# Patient Record
Sex: FEMALE | Race: WHITE | Employment: OTHER | URBAN - METROPOLITAN AREA
[De-identification: names, ages, dates, MRNs, and addresses within clinical notes are randomized per-mention and may not be internally consistent; named-entity substitution may affect disease eponyms.]

---

## 2023-10-30 ENCOUNTER — OFFICE VISIT (OUTPATIENT)
Age: 88
End: 2023-10-30

## 2023-10-30 ENCOUNTER — HOSPITAL ENCOUNTER (OUTPATIENT)
Age: 88
Discharge: HOME | End: 2023-11-02
Payer: MEDICARE

## 2023-10-30 VITALS — BODY MASS INDEX: 22.18 KG/M2 | HEIGHT: 66 IN | WEIGHT: 138 LBS

## 2023-10-30 DIAGNOSIS — Z01.818 PRE-OP TESTING: ICD-10-CM

## 2023-10-30 DIAGNOSIS — S63.502A SPRAIN OF LEFT WRIST, INITIAL ENCOUNTER: Primary | ICD-10-CM

## 2023-10-30 DIAGNOSIS — S63.502A SPRAIN OF LEFT WRIST, INITIAL ENCOUNTER: ICD-10-CM

## 2023-10-30 LAB
ANION GAP SERPL CALC-SCNC: 5 MMOL/L (ref 3–18)
BUN SERPL-MCNC: 24 MG/DL (ref 7–18)
BUN/CREAT SERPL: 22 (ref 12–20)
CA-I BLD-MCNC: 9 MG/DL (ref 8.5–10.1)
CHLORIDE SERPL-SCNC: 102 MMOL/L (ref 100–111)
CO2 SERPL-SCNC: 34 MMOL/L (ref 21–32)
CREAT SERPL-MCNC: 1.07 MG/DL (ref 0.6–1.3)
ERYTHROCYTE [DISTWIDTH] IN BLOOD BY AUTOMATED COUNT: 12.5 % (ref 11.6–14.5)
GLUCOSE SERPL-MCNC: 101 MG/DL (ref 74–99)
HCT VFR BLD AUTO: 36.3 % (ref 35–45)
HGB BLD-MCNC: 12.1 G/DL (ref 12–16)
MCH RBC QN AUTO: 31.1 PG (ref 24–34)
MCHC RBC AUTO-ENTMCNC: 33.3 G/DL (ref 31–37)
MCV RBC AUTO: 93.3 FL (ref 78–100)
NRBC # BLD: 0 K/UL (ref 0–0.01)
NRBC BLD-RTO: 0 PER 100 WBC
PLATELET # BLD AUTO: 218 K/UL (ref 135–420)
PMV BLD AUTO: 10.4 FL (ref 9.2–11.8)
POTASSIUM SERPL-SCNC: 3.6 MMOL/L (ref 3.5–5.5)
RBC # BLD AUTO: 3.89 M/UL (ref 4.2–5.3)
SODIUM SERPL-SCNC: 141 MMOL/L (ref 136–145)
WBC # BLD AUTO: 7.6 K/UL (ref 4.6–13.2)

## 2023-10-30 PROCEDURE — 85027 COMPLETE CBC AUTOMATED: CPT

## 2023-10-30 PROCEDURE — 80048 BASIC METABOLIC PNL TOTAL CA: CPT

## 2023-10-30 PROCEDURE — 93005 ELECTROCARDIOGRAM TRACING: CPT

## 2023-10-30 PROCEDURE — 36415 COLL VENOUS BLD VENIPUNCTURE: CPT

## 2023-10-30 RX ORDER — CEPHALEXIN 500 MG/1
500 CAPSULE ORAL EVERY 8 HOURS
Qty: 21 CAPSULE | Refills: 0 | Status: SHIPPED | OUTPATIENT
Start: 2023-10-30 | End: 2023-11-06

## 2023-10-30 RX ORDER — ONDANSETRON 8 MG/1
8 TABLET, ORALLY DISINTEGRATING ORAL EVERY 8 HOURS PRN
Qty: 20 TABLET | Refills: 0 | Status: SHIPPED | OUTPATIENT
Start: 2023-10-30 | End: 2023-11-06

## 2023-10-30 RX ORDER — SODIUM CHLORIDE 0.9 % (FLUSH) 0.9 %
5-40 SYRINGE (ML) INJECTION EVERY 12 HOURS SCHEDULED
OUTPATIENT
Start: 2023-10-30

## 2023-10-30 RX ORDER — SODIUM CHLORIDE 0.9 % (FLUSH) 0.9 %
5-40 SYRINGE (ML) INJECTION PRN
OUTPATIENT
Start: 2023-10-30

## 2023-10-30 RX ORDER — OXYCODONE HYDROCHLORIDE AND ACETAMINOPHEN 5; 325 MG/1; MG/1
1 TABLET ORAL
Qty: 30 TABLET | Refills: 0 | Status: SHIPPED | OUTPATIENT
Start: 2023-10-30 | End: 2023-11-07

## 2023-10-30 RX ORDER — CELECOXIB 100 MG/1
200 CAPSULE ORAL ONCE
OUTPATIENT
Start: 2023-10-30 | End: 2023-10-30

## 2023-10-30 RX ORDER — SODIUM CHLORIDE, SODIUM LACTATE, POTASSIUM CHLORIDE, CALCIUM CHLORIDE 600; 310; 30; 20 MG/100ML; MG/100ML; MG/100ML; MG/100ML
INJECTION, SOLUTION INTRAVENOUS CONTINUOUS
OUTPATIENT
Start: 2023-10-30

## 2023-10-30 NOTE — PATIENT INSTRUCTIONS
adapted under license by [facility]). If you have questions about a medical condition or this instruction, always ask your healthcare professional. [address] any warranty or liability for your use of this information.

## 2023-10-31 ENCOUNTER — TELEPHONE (OUTPATIENT)
Age: 88
End: 2023-10-31

## 2023-10-31 DIAGNOSIS — S63.502A SPRAIN OF LEFT WRIST, INITIAL ENCOUNTER: Primary | ICD-10-CM

## 2023-10-31 LAB
EKG ATRIAL RATE: 74 BPM
EKG DIAGNOSIS: NORMAL
EKG P AXIS: 54 DEGREES
EKG P-R INTERVAL: 188 MS
EKG Q-T INTERVAL: 390 MS
EKG QRS DURATION: 90 MS
EKG QTC CALCULATION (BAZETT): 432 MS
EKG R AXIS: 26 DEGREES
EKG T AXIS: 47 DEGREES
EKG VENTRICULAR RATE: 74 BPM

## 2023-10-31 RX ORDER — UBIDECARENONE 75 MG
50 CAPSULE ORAL DAILY
COMMUNITY

## 2023-10-31 RX ORDER — LEVOTHYROXINE SODIUM 0.05 MG/1
50 TABLET ORAL DAILY
COMMUNITY

## 2023-10-31 RX ORDER — METOPROLOL TARTRATE 50 MG/1
50 TABLET, FILM COATED ORAL 2 TIMES DAILY
COMMUNITY

## 2023-10-31 RX ORDER — ROPINIROLE 12 MG/1
12 TABLET, FILM COATED, EXTENDED RELEASE ORAL DAILY
COMMUNITY

## 2023-10-31 RX ORDER — FENTANYL 50 UG/1
1 PATCH TRANSDERMAL
COMMUNITY

## 2023-10-31 RX ORDER — HYDROCHLOROTHIAZIDE 12.5 MG/1
12.5 CAPSULE, GELATIN COATED ORAL EVERY MORNING
COMMUNITY

## 2023-10-31 RX ORDER — PANTOPRAZOLE SODIUM 20 MG/1
20 TABLET, DELAYED RELEASE ORAL DAILY
COMMUNITY

## 2023-10-31 RX ORDER — OXYCODONE HYDROCHLORIDE 5 MG/1
5 TABLET ORAL
Qty: 30 TABLET | Refills: 0 | Status: SHIPPED | OUTPATIENT
Start: 2023-10-31 | End: 2023-11-07

## 2023-10-31 RX ORDER — DULOXETIN HYDROCHLORIDE 20 MG/1
20 CAPSULE, DELAYED RELEASE ORAL DAILY
COMMUNITY

## 2023-10-31 RX ORDER — ACETAMINOPHEN 500 MG
1000 TABLET ORAL 3 TIMES DAILY
Qty: 180 TABLET | Refills: 0 | Status: SHIPPED | OUTPATIENT
Start: 2023-10-31

## 2023-10-31 RX ORDER — ATORVASTATIN CALCIUM 40 MG/1
40 TABLET, FILM COATED ORAL DAILY
COMMUNITY

## 2023-10-31 RX ORDER — GABAPENTIN 100 MG/1
100 CAPSULE ORAL 3 TIMES DAILY
COMMUNITY

## 2023-10-31 RX ORDER — MELOXICAM 15 MG/1
15 TABLET ORAL DAILY
COMMUNITY

## 2023-10-31 RX ORDER — FUROSEMIDE 20 MG/1
10 TABLET ORAL DAILY
COMMUNITY

## 2023-10-31 RX ORDER — CALCIUM CARBONATE 500(1250)
500 TABLET ORAL DAILY
COMMUNITY

## 2023-10-31 NOTE — TELEPHONE ENCOUNTER
Pharmacy called today, states they do not have percocet 5-325mg in stock, it is backordered. Please send in Oxy 5mg and Tylenol 325mg as they can fill that. Thanks!

## 2023-11-01 DIAGNOSIS — S63.502A SPRAIN OF LEFT WRIST, INITIAL ENCOUNTER: Primary | ICD-10-CM

## 2023-11-02 ENCOUNTER — ANESTHESIA EVENT (OUTPATIENT)
Age: 88
End: 2023-11-02
Payer: MEDICARE

## 2023-11-02 RX ORDER — SODIUM CHLORIDE, SODIUM LACTATE, POTASSIUM CHLORIDE, CALCIUM CHLORIDE 600; 310; 30; 20 MG/100ML; MG/100ML; MG/100ML; MG/100ML
INJECTION, SOLUTION INTRAVENOUS CONTINUOUS
Status: CANCELLED | OUTPATIENT
Start: 2023-11-02

## 2023-11-02 RX ORDER — ONDANSETRON 2 MG/ML
4 INJECTION INTRAMUSCULAR; INTRAVENOUS
Status: CANCELLED | OUTPATIENT
Start: 2023-11-02 | End: 2023-11-03

## 2023-11-02 RX ORDER — SODIUM CHLORIDE 0.9 % (FLUSH) 0.9 %
5-40 SYRINGE (ML) INJECTION EVERY 12 HOURS SCHEDULED
Status: CANCELLED | OUTPATIENT
Start: 2023-11-02

## 2023-11-02 RX ORDER — FENTANYL CITRATE 50 UG/ML
50 INJECTION, SOLUTION INTRAMUSCULAR; INTRAVENOUS EVERY 5 MIN PRN
Status: CANCELLED | OUTPATIENT
Start: 2023-11-02

## 2023-11-02 RX ORDER — DEXAMETHASONE SODIUM PHOSPHATE 4 MG/ML
4 INJECTION, SOLUTION INTRA-ARTICULAR; INTRALESIONAL; INTRAMUSCULAR; INTRAVENOUS; SOFT TISSUE
Status: CANCELLED | OUTPATIENT
Start: 2023-11-02 | End: 2023-11-03

## 2023-11-06 ENCOUNTER — APPOINTMENT (OUTPATIENT)
Age: 88
End: 2023-11-06
Attending: ORTHOPAEDIC SURGERY
Payer: MEDICARE

## 2023-11-06 ENCOUNTER — HOSPITAL ENCOUNTER (OUTPATIENT)
Age: 88
Setting detail: OUTPATIENT SURGERY
Discharge: HOME | End: 2023-11-06
Attending: ORTHOPAEDIC SURGERY | Admitting: ORTHOPAEDIC SURGERY
Payer: MEDICARE

## 2023-11-06 ENCOUNTER — ANESTHESIA (OUTPATIENT)
Age: 88
End: 2023-11-06
Payer: MEDICARE

## 2023-11-06 VITALS
OXYGEN SATURATION: 100 % | TEMPERATURE: 97.5 F | BODY MASS INDEX: 20.78 KG/M2 | HEIGHT: 66 IN | RESPIRATION RATE: 16 BRPM | HEART RATE: 58 BPM | DIASTOLIC BLOOD PRESSURE: 76 MMHG | WEIGHT: 129.3 LBS | SYSTOLIC BLOOD PRESSURE: 165 MMHG

## 2023-11-06 PROCEDURE — 3700000000 HC ANESTHESIA ATTENDED CARE: Performed by: ORTHOPAEDIC SURGERY

## 2023-11-06 PROCEDURE — 3700000001 HC ADD 15 MINUTES (ANESTHESIA): Performed by: ORTHOPAEDIC SURGERY

## 2023-11-06 PROCEDURE — 73100 X-RAY EXAM OF WRIST: CPT

## 2023-11-06 PROCEDURE — 6360000002 HC RX W HCPCS: Performed by: ORTHOPAEDIC SURGERY

## 2023-11-06 PROCEDURE — 3600000013 HC SURGERY LEVEL 3 ADDTL 15MIN: Performed by: ORTHOPAEDIC SURGERY

## 2023-11-06 PROCEDURE — 6360000002 HC RX W HCPCS

## 2023-11-06 PROCEDURE — 2709999900 HC NON-CHARGEABLE SUPPLY: Performed by: ORTHOPAEDIC SURGERY

## 2023-11-06 PROCEDURE — 7100000011 HC PHASE II RECOVERY - ADDTL 15 MIN: Performed by: ORTHOPAEDIC SURGERY

## 2023-11-06 PROCEDURE — 3600000003 HC SURGERY LEVEL 3 BASE: Performed by: ORTHOPAEDIC SURGERY

## 2023-11-06 PROCEDURE — C1713 ANCHOR/SCREW BN/BN,TIS/BN: HCPCS | Performed by: ORTHOPAEDIC SURGERY

## 2023-11-06 PROCEDURE — 2580000003 HC RX 258

## 2023-11-06 PROCEDURE — 2580000003 HC RX 258: Performed by: ORTHOPAEDIC SURGERY

## 2023-11-06 PROCEDURE — 6370000000 HC RX 637 (ALT 250 FOR IP)

## 2023-11-06 PROCEDURE — 2500000003 HC RX 250 WO HCPCS: Performed by: ORTHOPAEDIC SURGERY

## 2023-11-06 PROCEDURE — 6360000002 HC RX W HCPCS: Performed by: NURSE ANESTHETIST, CERTIFIED REGISTERED

## 2023-11-06 PROCEDURE — 7100000010 HC PHASE II RECOVERY - FIRST 15 MIN: Performed by: ORTHOPAEDIC SURGERY

## 2023-11-06 DEVICE — WIRE FIX L229MM DIA1.6MM S STL STYL 7 SMOOTH DBL BAYNT TIP: Type: IMPLANTABLE DEVICE | Site: WRIST | Status: FUNCTIONAL

## 2023-11-06 RX ORDER — SODIUM CHLORIDE, SODIUM LACTATE, POTASSIUM CHLORIDE, CALCIUM CHLORIDE 600; 310; 30; 20 MG/100ML; MG/100ML; MG/100ML; MG/100ML
INJECTION, SOLUTION INTRAVENOUS CONTINUOUS
Status: DISCONTINUED | OUTPATIENT
Start: 2023-11-06 | End: 2023-11-06 | Stop reason: HOSPADM

## 2023-11-06 RX ORDER — KETOROLAC TROMETHAMINE 30 MG/ML
30 INJECTION, SOLUTION INTRAMUSCULAR; INTRAVENOUS EVERY 6 HOURS PRN
Status: CANCELLED | OUTPATIENT
Start: 2023-11-06 | End: 2023-11-06

## 2023-11-06 RX ORDER — ONDANSETRON 4 MG/1
4 TABLET, ORALLY DISINTEGRATING ORAL EVERY 8 HOURS PRN
Status: DISCONTINUED | OUTPATIENT
Start: 2023-11-06 | End: 2023-11-06 | Stop reason: HOSPADM

## 2023-11-06 RX ORDER — BUPIVACAINE HYDROCHLORIDE 2.5 MG/ML
INJECTION, SOLUTION EPIDURAL; INFILTRATION; INTRACAUDAL PRN
Status: DISCONTINUED | OUTPATIENT
Start: 2023-11-06 | End: 2023-11-06 | Stop reason: ALTCHOICE

## 2023-11-06 RX ORDER — OXYCODONE HYDROCHLORIDE AND ACETAMINOPHEN 5; 325 MG/1; MG/1
1 TABLET ORAL EVERY 4 HOURS PRN
Status: DISCONTINUED | OUTPATIENT
Start: 2023-11-06 | End: 2023-11-06 | Stop reason: HOSPADM

## 2023-11-06 RX ORDER — FENTANYL CITRATE 50 UG/ML
INJECTION, SOLUTION INTRAMUSCULAR; INTRAVENOUS PRN
Status: DISCONTINUED | OUTPATIENT
Start: 2023-11-06 | End: 2023-11-06 | Stop reason: SDUPTHER

## 2023-11-06 RX ORDER — SODIUM CHLORIDE 0.9 % (FLUSH) 0.9 %
5-40 SYRINGE (ML) INJECTION PRN
Status: DISCONTINUED | OUTPATIENT
Start: 2023-11-06 | End: 2023-11-06 | Stop reason: HOSPADM

## 2023-11-06 RX ORDER — PROPOFOL 10 MG/ML
INJECTION, EMULSION INTRAVENOUS PRN
Status: DISCONTINUED | OUTPATIENT
Start: 2023-11-06 | End: 2023-11-06 | Stop reason: SDUPTHER

## 2023-11-06 RX ORDER — OXYCODONE HYDROCHLORIDE AND ACETAMINOPHEN 5; 325 MG/1; MG/1
2 TABLET ORAL EVERY 4 HOURS PRN
Status: DISCONTINUED | OUTPATIENT
Start: 2023-11-06 | End: 2023-11-06 | Stop reason: HOSPADM

## 2023-11-06 RX ORDER — ONDANSETRON 2 MG/ML
4 INJECTION INTRAMUSCULAR; INTRAVENOUS EVERY 6 HOURS PRN
Status: DISCONTINUED | OUTPATIENT
Start: 2023-11-06 | End: 2023-11-06 | Stop reason: HOSPADM

## 2023-11-06 RX ADMIN — FENTANYL CITRATE 100 MCG: 50 INJECTION, SOLUTION INTRAMUSCULAR; INTRAVENOUS at 07:34

## 2023-11-06 RX ADMIN — PROPOFOL 100 MG: 10 INJECTION, EMULSION INTRAVENOUS at 07:39

## 2023-11-06 RX ADMIN — OXYCODONE AND ACETAMINOPHEN 1 TABLET: 5; 325 TABLET ORAL at 08:28

## 2023-11-06 RX ADMIN — PROPOFOL 100 MG: 10 INJECTION, EMULSION INTRAVENOUS at 07:45

## 2023-11-06 RX ADMIN — WATER 2000 MG: 1 INJECTION INTRAMUSCULAR; INTRAVENOUS; SUBCUTANEOUS at 07:34

## 2023-11-06 ASSESSMENT — PAIN DESCRIPTION - LOCATION
LOCATION: ARM

## 2023-11-06 ASSESSMENT — PAIN DESCRIPTION - DESCRIPTORS
DESCRIPTORS: SORE
DESCRIPTORS: ACHING;SORE
DESCRIPTORS: ACHING

## 2023-11-06 ASSESSMENT — PAIN DESCRIPTION - FREQUENCY
FREQUENCY: CONTINUOUS
FREQUENCY: CONTINUOUS

## 2023-11-06 ASSESSMENT — PAIN - FUNCTIONAL ASSESSMENT: PAIN_FUNCTIONAL_ASSESSMENT: 0-10

## 2023-11-06 ASSESSMENT — PAIN SCALES - GENERAL
PAINLEVEL_OUTOF10: 4
PAINLEVEL_OUTOF10: 5
PAINLEVEL_OUTOF10: 6
PAINLEVEL_OUTOF10: 6

## 2023-11-06 ASSESSMENT — PAIN DESCRIPTION - ORIENTATION
ORIENTATION: LEFT

## 2023-11-06 ASSESSMENT — PAIN DESCRIPTION - PAIN TYPE
TYPE: SURGICAL PAIN
TYPE: SURGICAL PAIN

## 2023-11-06 ASSESSMENT — PAIN DESCRIPTION - ONSET: ONSET: GRADUAL

## 2023-11-06 NOTE — ANESTHESIA POSTPROCEDURE EVALUATION
Department of Anesthesiology  Postprocedure Note    Patient: Cheryl Maldonado  MRN: [id number]  YOB: 1935  Date of evaluation: 11/6/2023      Procedure Summary     Date: 11/06/23 Room / Location: [facility] MAIN 03 / [facility] MAIN OR    Anesthesia Start: 0727 Anesthesia Stop: 0806    Procedure: LEFT WRIST CLOSED REDUCTION AND PINNING (Left: Wrist) Diagnosis:       Left wrist pain      (Left wrist pain [M25.532])    Surgeons: Marissa Pennington MD Responsible Provider: LILI Villatoro CRNA    Anesthesia Type: MAC ASA Status: 2          Anesthesia Type: MAC    Roxanna Phase I: Roxanna Score: 10    Roxanna Phase II:        Anesthesia Post Evaluation    Patient location during evaluation: bedside  Patient participation: complete - patient participated  Level of consciousness: awake and awake and alert  Pain score: 0  Airway patency: patent  Nausea & Vomiting: no nausea  Complications: no  Cardiovascular status: blood pressure returned to baseline  Respiratory status: acceptable  Hydration status: euvolemic  Multimodal analgesia pain management approach  Pain management: adequate

## 2023-11-06 NOTE — INTERVAL H&P NOTE
Update History & Physical    The Patient's History and Physical was reviewed with the patient. The patient was examined. There was no change. The surgical site was confirmed by the patient and me. Patient understands and wants to proceed with the procedure. If applicable, I have discussed with the patient / power of  the rationale for blood component transfusion; its benefits in treating or preventing fatigue, organ damage, or death; and its risk which includes mild transfusion reactions, rare risk of blood borne infection, or more serious but rare reactions. I have discussed the alternatives to transfusion, including the risk and consequences of not receiving transfusion. The patient / [facility]  had an opportunity to ask questions and had agreed to proceed with transfusion of blood components. Plan:  The risk, benefits, expected outcome, and alternative to the recommended procedure have been discussed with the patient.

## 2023-11-06 NOTE — OP NOTE
Operative Note    Patient: Florencio Noel MRN: [id number]  Surgery Date: 11/6/2023           Procedure  Primary Surgeon    LEFT WRIST CLOSED REDUCTION AND PINNING  Dean Anderson MD    * Panel 2 does not exist *  * Panel 2 does not exist *    * Panel 3 does not exist *  * Panel 3 does not exist *     Surgeon(s) and Role:     * Dean Anderson MD - Primary    Other OR Staff/Assistants:  Circulator: Jose Maria Fuentes RN  First Assistant: Gricelda Olsen RN  Scrub Person First: Joe Toro    1st Assistant Tasks:  Closing    Pre-operative Diagnosis:  Left wrist pain [M25.532]    Post-operative Diagnosis: same as preop diagnosis    Anesthesia Type: General     Findings: Distal radius closed fracture    Complications: No    EBL: 5 cc    Specimens: None    Implants:   Implants       Type Not Specified    Wire Fix L229mm Dia1.6mm S Stl Styl 7 Smooth Dbl Baynt Tip - [id number] - Implanted   (Left) Wrist      Inventory item: WIRE FIX L229MM DIA1. 6MM S STL STYL 7 SMOOTH DBL BAYNT TIP Model/Cat number: [id number]    : Ron Orris TRAUMA-WD Lot number: [id number]      As of 11/6/2023       Status: Implanted                          Operative procedure: Left wrist closed reduction and pinning    Operative note: Left wrist was prepped and draped in sterile fashion after adequate anesthesia was given timeout performed and tourniquet was inflated. Subsequently under fluoroscopy the wrist was closed reduced. Put 2 percutaneous pins in the radial styloid were placed and confirmed good position on AP and lateral radiographs. Subsequently the pins were cut. Ana Rosa Ross ball was placed. Patient was placed into his small volar soft padded splint. Patient was taken to PACU in stable condition. Post percutaneous pin placement radiographs revealed adequate alignment.         Of note fluoroscopy was utilized for approximately 10 seconds for closed reduction percutaneous pinning    Dean Anderson MD

## 2023-11-15 ENCOUNTER — OFFICE VISIT (OUTPATIENT)
Age: 88
End: 2023-11-15

## 2023-11-15 DIAGNOSIS — S52.552D OTHER CLOSED EXTRA-ARTICULAR FRACTURE OF DISTAL END OF LEFT RADIUS WITH ROUTINE HEALING, SUBSEQUENT ENCOUNTER: Primary | ICD-10-CM

## 2023-11-15 DIAGNOSIS — Z87.81 HX OF REDUCTION OF CLOSED FRACTURE: ICD-10-CM

## 2023-11-15 DIAGNOSIS — M25.532 LEFT WRIST PAIN: ICD-10-CM

## 2023-12-11 NOTE — PROGRESS NOTES
Review of Systems:  Patient is a pleasant appearing individual, appropriately dressed, well hydrated, well nourished, who is alert, appropriately oriented for age, and in no acute distress with a normal gait and normal affect who does not appear to be in any significant pain. Physical Exam:  Left wrist - incisions clean and dry, mild swelling, neurovascularly intact, pins in place, no erythema, decreased range of motion, moderate weakness to the wrist and hand     Please note that a DME was provided from our office and fitted to an appropriate size. DME provided will help decrease soft tissue swelling, assist with stabilization, and decrease pain with immobilization. Plan:  1. Initiate OT - patient to call with location   2. Remove pins, transition to wrist splint, - wound care/showering discussed. 3. Follow up in 4 weeks with repeat xrays for the left wrist to recheck incision, range of motion, and as well as needed or if symptoms worsen. Encounter Diagnoses   Name Primary? Other closed extra-articular fracture of distal end of left radius with routine healing, subsequent encounter Yes    Hx of reduction of closed fracture         As part of continued conservative pain management options the patient was advised to utilize Tylenol or OTC NSAIDS as long as it is not medically contraindicated. Return to Office:      Scribed by LILI Shook - CNP as dictated by LILI Howe, CNP. Documentation, performed by, True and Accepted LILI Howe, CNP

## 2023-12-12 ENCOUNTER — OFFICE VISIT (OUTPATIENT)
Age: 88
End: 2023-12-12

## 2023-12-12 DIAGNOSIS — Z87.81 HX OF REDUCTION OF CLOSED FRACTURE: ICD-10-CM

## 2023-12-12 DIAGNOSIS — S52.552D OTHER CLOSED EXTRA-ARTICULAR FRACTURE OF DISTAL END OF LEFT RADIUS WITH ROUTINE HEALING, SUBSEQUENT ENCOUNTER: Primary | ICD-10-CM

## 2024-01-16 ENCOUNTER — OFFICE VISIT (OUTPATIENT)
Age: 89
End: 2024-01-16

## 2024-01-16 DIAGNOSIS — Z87.81 HX OF REDUCTION OF CLOSED FRACTURE: ICD-10-CM

## 2024-01-16 DIAGNOSIS — M25.532 LEFT WRIST PAIN: ICD-10-CM

## 2024-01-16 DIAGNOSIS — S52.552D OTHER CLOSED EXTRA-ARTICULAR FRACTURE OF DISTAL END OF LEFT RADIUS WITH ROUTINE HEALING, SUBSEQUENT ENCOUNTER: Primary | ICD-10-CM

## 2024-01-16 PROCEDURE — 99024 POSTOP FOLLOW-UP VISIT: CPT

## 2024-01-16 NOTE — PROGRESS NOTES
Name: Jericho Gómez    : 1935        2023     9:00 AM 2023     8:32 AM 2023     8:17 AM 2023     8:02 AM 2023     6:44 AM 10/30/2023     4:03 PM   Ambulatory Bariatric Summary   Systolic 165 176 171 159 206    Diastolic 76 76 77 70 72    Pulse 58 58 59 58 61    Temp    97.5 °F (36.4 °C) 98.1 °F (36.7 °C)    Respirations 16 17 16 16 18    Weight - Scale     129.3 138   Height     1.676 m (5' 6\") 1.676 m (5' 6\")   BMI     20.9 kg/m2 22.3 kg/m2   Weight - Scale     58.7 kg (129 lb 4.8 oz) 62.6 kg (138 lb)   BMI (Calculated)     20.9 22.3       There is no height or weight on file to calculate BMI.    Service Dept: [facility] ORTHOPAEDICS AND SPORTS MEDICINE  [address] [place] [address]  Dept: [phone]  Dept Fax: [phone]     Patient's Pharmacies:    [facility] -  [phone] -  [phone]  [address] [place] [address]  Phone: [phone] Fax: [phone]       Chief Complaint   Patient presents with    Post-Op Check    Wrist Pain       HPI:  Patient presents for postop care following a left wrist closed reduction and pinning. Surgery was on 2023. Pain is a 2/10. Pain is controlled with current analgesics.  Medication(s) being used: acetaminophen.     X-rays of the left wrist 3 views performed today in the office show a well aligned distal radius fracture post closed reduction and pinning with routine healing.        There were no vitals taken for this visit.   No Known Allergies   Current Outpatient Medications   Medication Sig Dispense Refill    DULoxetine (CYMBALTA) 20 MG extended release capsule Take 1 capsule by mouth daily      fentaNYL (DURAGESIC) 50 MCG/HR Place 1 patch onto the skin every 72 hours.      gabapentin (NEURONTIN) 100 MG capsule Take 1 capsule by mouth 3 times daily.      atorvastatin (LIPITOR) 40 MG tablet Take

## 2024-01-16 NOTE — PATIENT INSTRUCTIONS
Colles Fracture: Care Instructions  Overview     A Colles (say \"CALL-us\" or \"CALL-eez\") fracture is a specific type of broken wrist. In this type of fracture, the broken bone in the wrist tilts upward when the hand is palm down. The break may happen when you throw out a hand to protect yourself in a fall. Your treatment depends on how bad the break is.  Your doctor may have put your wrist in a cast or splint. This will help keep your wrist stable and keep the bone in the right position. Sometimes surgery is needed to help keep the bone in position for good healing.  It will take several weeks to a few months for your wrist to heal. You can help it heal with care at home. You heal best when you take good care of yourself. Eat a variety of healthy foods, and don't smoke.  You may have had a sedative to help you relax. You may be unsteady after having sedation. It can take a few hours for the medicine's effects to wear off. Common side effects include nausea, vomiting, and feeling sleepy or tired.  The doctor has checked you carefully, but problems can develop later. If you notice any problems or new symptoms, get medical treatment right away.  Follow-up care is a key part of your treatment and safety. Be sure to make and go to all appointments, and call your doctor if you are having problems. It's also a good idea to know your test results and keep a list of the medicines you take.  How can you care for yourself at home?  If your doctor gave you a sedative:  For 24 hours, don't do anything that requires attention to detail, such as going to work, making important decisions, or signing any legal documents. It takes time for the medicine's effects to completely wear off.  For your safety, do not drive or operate any machinery that could be dangerous. Wait until the medicine wears off. You need to be able to think clearly and react easily.  Be safe with medicines. Take pain medicines exactly as directed.  If the doctor

## 2024-05-04 ENCOUNTER — HOSPITAL ENCOUNTER (EMERGENCY)
Age: 89
Discharge: HOME | End: 2024-05-04
Attending: EMERGENCY MEDICINE
Payer: MEDICARE

## 2024-05-04 ENCOUNTER — APPOINTMENT (OUTPATIENT)
Age: 89
End: 2024-05-04
Payer: MEDICARE

## 2024-05-04 VITALS
OXYGEN SATURATION: 98 % | RESPIRATION RATE: 16 BRPM | HEART RATE: 70 BPM | DIASTOLIC BLOOD PRESSURE: 63 MMHG | SYSTOLIC BLOOD PRESSURE: 163 MMHG | TEMPERATURE: 98.6 F

## 2024-05-04 DIAGNOSIS — S52.501A CLOSED FRACTURE OF DISTAL END OF RIGHT RADIUS, UNSPECIFIED FRACTURE MORPHOLOGY, INITIAL ENCOUNTER: ICD-10-CM
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
## 2024-05-04 PROCEDURE — 73100 X-RAY EXAM OF WRIST: CPT

## 2024-05-04 PROCEDURE — 99283 EMERGENCY DEPT VISIT LOW MDM: CPT

## 2024-05-04 PROCEDURE — 6370000000 HC RX 637 (ALT 250 FOR IP): Performed by: EMERGENCY MEDICINE

## 2024-05-04 RX ORDER — ACETAMINOPHEN 500 MG
1000 TABLET ORAL
Status: COMPLETED | OUTPATIENT
Start: 2024-05-04 | End: 2024-05-04

## 2024-05-04 RX ADMIN — ACETAMINOPHEN 1000 MG: 500 TABLET ORAL at 17:32

## 2024-05-04 ASSESSMENT — LIFESTYLE VARIABLES
HOW OFTEN DO YOU HAVE A DRINK CONTAINING ALCOHOL: NEVER
HOW MANY STANDARD DRINKS CONTAINING ALCOHOL DO YOU HAVE ON A TYPICAL DAY: PATIENT DOES NOT DRINK

## 2024-05-04 NOTE — ED PROVIDER NOTES
[facility] EMERGENCY DEPT  EMERGENCY DEPARTMENT ENCOUNTER       Pt Name: Jericho Gómez  MRN: [id number]  Birthdate 1935  Date of evaluation: 5/4/2024  Provider: Enio Sahni MD   PCP: Unknown, Provider, APRN - NP  Note Started: 7:01 PM 5/4/24     CHIEF COMPLAINT       Chief Complaint   Patient presents with    Wrist Injury        HISTORY OF PRESENT ILLNESS: 1 or more elements      History From: Patient  History limited by: Nothing     Jericho Gómez is a 89 y.o. female who presents to ED complaining of a fall, she reports stumbled on a toy and fell while picking strawberries about 2 hours prior to ED arrival.  She reports injury to her right wrist.  She denies head neck injuries.  She has chronic pain and wears fentanyl.  She reports chronic pain is in the back.     Nursing Notes were all reviewed and agreed with or any disagreements were addressed in the HPI.     REVIEW OF SYSTEMS      Review of Systems     Positives and Pertinent negatives as per HPI.    PAST HISTORY     Past Medical History:  Past Medical History:   Diagnosis Date    Arthritis     Hypercholesteremia     Hypertension     Osteoporosis     Thyroid disease        Past Surgical History:  Past Surgical History:   Procedure Laterality Date    CHOLECYSTECTOMY      HYSTERECTOMY (CERVIX STATUS UNKNOWN)      WRIST CLOSED REDUCTION Left 11/6/2023    LEFT WRIST CLOSED REDUCTION AND PINNING performed by Elijah Ramirez MD at [facility] MAIN OR       Family History:  Family History   Problem Relation Age of Onset    No Known Problems Mother     No Known Problems Father        Social History:  Social History     Tobacco Use    Smoking status: Never    Smokeless tobacco: Never   Substance Use Topics    Alcohol use: Never       Allergies:  No Known Allergies    CURRENT MEDICATIONS      Previous Medications    ACETAMINOPHEN (TYLENOL) 500 MG TABLET    Take 2 tablets by mouth 3 times daily    ATORVASTATIN (LIPITOR) 40 MG TABLET    Take 1 tablet by mouth

## 2024-05-04 NOTE — ED TRIAGE NOTES
Patient fell outside while picking strawberries, right wrist pain with swelling noted. Patient able to move fingers, + cap refills brisk, + radial pulses. No pain meds at home due to wearing fentanyl patch

## 2024-05-09 ENCOUNTER — OFFICE VISIT (OUTPATIENT)
Age: 89
End: 2024-05-09
Payer: MEDICARE

## 2024-05-09 DIAGNOSIS — S52.531A CLOSED COLLES' FRACTURE OF RIGHT RADIUS, INITIAL ENCOUNTER: Primary | ICD-10-CM

## 2024-05-09 DIAGNOSIS — M25.531 RIGHT WRIST PAIN: ICD-10-CM

## 2024-05-09 PROCEDURE — 1090F PRES/ABSN URINE INCON ASSESS: CPT | Performed by: ORTHOPAEDIC SURGERY

## 2024-05-09 PROCEDURE — G8420 CALC BMI NORM PARAMETERS: HCPCS | Performed by: ORTHOPAEDIC SURGERY

## 2024-05-09 PROCEDURE — G8427 DOCREV CUR MEDS BY ELIG CLIN: HCPCS | Performed by: ORTHOPAEDIC SURGERY

## 2024-05-09 PROCEDURE — 99213 OFFICE O/P EST LOW 20 MIN: CPT | Performed by: ORTHOPAEDIC SURGERY

## 2024-05-09 PROCEDURE — 1036F TOBACCO NON-USER: CPT | Performed by: ORTHOPAEDIC SURGERY

## 2024-05-09 PROCEDURE — 1124F ACP DISCUSS-NO DSCNMKR DOCD: CPT | Performed by: ORTHOPAEDIC SURGERY

## 2024-05-09 PROCEDURE — 25600 CLTX DST RDL FX/EPHYS SEP WO: CPT | Performed by: ORTHOPAEDIC SURGERY

## 2024-05-09 NOTE — PROGRESS NOTES
Name: Jericho Gómez    : 1935     [facility] ORTHOPAEDICS AND SPORTS MEDICINE  [address], [address]  [facility] [address]  Dept: [phone]  Dept Fax: [phone]     Chief Complaint   Patient presents with    Post-Op Check    Wrist Pain        There were no vitals taken for this visit.     No Known Allergies     Current Outpatient Medications   Medication Sig Dispense Refill    DULoxetine (CYMBALTA) 20 MG extended release capsule Take 1 capsule by mouth daily      fentaNYL (DURAGESIC) 50 MCG/HR Place 1 patch onto the skin every 72 hours.      gabapentin (NEURONTIN) 100 MG capsule Take 1 capsule by mouth 3 times daily.      atorvastatin (LIPITOR) 40 MG tablet Take 1 tablet by mouth daily      levothyroxine (SYNTHROID) 50 MCG tablet Take 1 tablet by mouth Daily      hydroCHLOROthiazide (MICROZIDE) 12.5 MG capsule Take 1 capsule by mouth every morning      meloxicam (MOBIC) 15 MG tablet Take 1 tablet by mouth daily      metoprolol tartrate (LOPRESSOR) 50 MG tablet Take 1 tablet by mouth 2 times daily      pantoprazole (PROTONIX) 20 MG tablet Take 1 tablet by mouth daily      rOPINIRole (REQUIP XL) 12 MG TB24 extended release tablet Take 1 tablet by mouth daily      furosemide (LASIX) 20 MG tablet Take 0.5 tablets by mouth daily      vitamin B-12 (CYANOCOBALAMIN) 100 MCG tablet Take 0.5 tablets by mouth daily      calcium carbonate (OSCAL) 500 MG TABS tablet Take 1 tablet by mouth daily      acetaminophen (TYLENOL) 500 MG tablet Take 2 tablets by mouth 3 times daily 180 tablet 0     No current facility-administered medications for this visit.      There is no problem list on file for this patient.     Family History   Problem Relation Age of Onset    No Known Problems Mother     No Known Problems Father        Past Surgical History:   Procedure Laterality Date    CHOLECYSTECTOMY      HYSTERECTOMY (CERVIX STATUS UNKNOWN)      WRIST CLOSED

## 2024-06-10 DIAGNOSIS — M25.531 RIGHT WRIST PAIN: Primary | ICD-10-CM

## 2024-06-11 ENCOUNTER — OFFICE VISIT (OUTPATIENT)
Age: 89
End: 2024-06-11

## 2024-06-11 ENCOUNTER — HOSPITAL ENCOUNTER (OUTPATIENT)
Age: 89
Discharge: HOME | End: 2024-06-14
Payer: MEDICARE

## 2024-06-11 DIAGNOSIS — M25.531 RIGHT WRIST PAIN: ICD-10-CM

## 2024-06-11 DIAGNOSIS — S52.531D CLOSED COLLES' FRACTURE OF RIGHT RADIUS WITH ROUTINE HEALING, SUBSEQUENT ENCOUNTER: Primary | ICD-10-CM

## 2024-06-11 PROCEDURE — 73110 X-RAY EXAM OF WRIST: CPT

## 2024-06-11 PROCEDURE — 99024 POSTOP FOLLOW-UP VISIT: CPT

## 2024-06-11 NOTE — PROGRESS NOTES
Name: Jericho Gómez    : 1935        2024     5:31 PM 2024     5:11 PM 2024     5:10 PM 2023     9:00 AM 2023     8:32 AM 2023     8:17 AM 2023     8:02 AM   Ambulatory Bariatric Summary   Systolic 163 174 176 165 176 171 159   Diastolic 63 65 72 76 76 77 70   Pulse   70 58 58 59 58   Temp   98.6 °F (37 °C)    97.5 °F (36.4 °C)   Respirations   16 16 17 16 16       There is no height or weight on file to calculate BMI.    Service Dept: [facility] ORTHOPAEDICS AND SPORTS MEDICINE  [address] [place] [address], [facility]  [facility] [address]  Dept: [phone]  Dept Fax: [phone]     Patient's Pharmacies:    [facility] [address] -  [phone] -  [phone]  [address] [place] [address]  Phone: [phone] Fax: [phone]    [facility][id number] - [address] Renee HENAO [phone] -  [phone]  [address] [place] [address] [address] [place] [address]  Phone: [phone] Fax: [phone]       Chief Complaint   Patient presents with    Fracture    Wrist Pain     Right       HPI:  The patient is here with a chief complaint of right wrist pain, throbbing, burning pain.  Pain is 3/10.     X-rays of the right wrist AP, lateral and oblique are positive for right distal radius extraarticular fracture with a slight dorsal angulation and interval healing.     There were no vitals taken for this visit.   No Known Allergies   Current Outpatient Medications   Medication Sig Dispense Refill    DULoxetine (CYMBALTA) 20 MG extended release capsule Take 1 capsule by mouth daily      fentaNYL (DURAGESIC) 50 MCG/HR Place 1 patch onto the skin every 72 hours.      gabapentin (NEURONTIN) 100 MG capsule Take 1 capsule by mouth 3 times daily.      atorvastatin (LIPITOR) 40 MG tablet Take 1 tablet by mouth daily      levothyroxine (SYNTHROID) 50 MCG tablet Take 1 tablet by mouth

## 2024-06-11 NOTE — PATIENT INSTRUCTIONS
adapted under license by [employer].. If you have questions about a medical condition or this instruction, always ask your healthcare professional. [employer] disclaims any warranty or liability for your use of this information.

## 2024-07-08 ENCOUNTER — OFFICE VISIT (OUTPATIENT)
Age: 89
End: 2024-07-08

## 2024-07-08 ENCOUNTER — HOSPITAL ENCOUNTER (OUTPATIENT)
Age: 89
Discharge: HOME | End: 2024-07-11
Payer: MEDICARE

## 2024-07-08 DIAGNOSIS — M25.531 RIGHT WRIST PAIN: ICD-10-CM

## 2024-07-08 DIAGNOSIS — S52.531D CLOSED COLLES' FRACTURE OF RIGHT RADIUS WITH ROUTINE HEALING, SUBSEQUENT ENCOUNTER: Primary | ICD-10-CM

## 2024-07-08 DIAGNOSIS — M25.531 RIGHT WRIST PAIN: Primary | ICD-10-CM

## 2024-07-08 DIAGNOSIS — S52.531D CLOSED COLLES' FRACTURE OF RIGHT RADIUS WITH ROUTINE HEALING, SUBSEQUENT ENCOUNTER: ICD-10-CM

## 2024-07-08 PROCEDURE — 73110 X-RAY EXAM OF WRIST: CPT

## 2024-07-08 PROCEDURE — 99024 POSTOP FOLLOW-UP VISIT: CPT

## 2024-07-08 RX ORDER — POTASSIUM CHLORIDE 750 MG/1
TABLET, FILM COATED, EXTENDED RELEASE ORAL
COMMUNITY
Start: 2024-06-24

## 2024-07-08 NOTE — PROGRESS NOTES
and the record furthermore I have reviewed prior medical record(s) regarding this patients care during this appointment.     Review of Systems:  Patient is a pleasant appearing individual, appropriately dressed, well hydrated, well nourished, who is alert, appropriately oriented for age, and in no acute distress with a normal gait and normal affect who does not appear to be in any significant pain.     Physical Exam:  Left wrist - No point tenderness, Full range of motion, No instability, No Weakness, No, skin lesions, No swelling, Grossly neurovascularly intact.    Right wrist - No point tenderness, Full range of motion, No instability, No Weakness, No, skin lesions, No swelling, Grossly neurovascularly intact.       Plan:  Plan will be to continue OT and home exercises, therapy discharge per therapist discretion. Patient is to increase activities as tolerated, weight bearing as tolerated, no restrictions. Follow up as needed or if symptoms worsen.    Encounter Diagnoses   Name Primary?    Closed Colles' fracture of right radius with routine healing, subsequent encounter Yes    Right wrist pain         As part of continued conservative pain management options the patient was advised to utilize Tylenol or OTC NSAIDS as long as it is not medically contraindicated.     Return to Office:   Follow-up and Dispositions    Return if symptoms worsen or fail to improve.        Scribed by LILI More - CNP as dictated by LILI Howe, CNP.  Documentation, performed by, True and Accepted LILI Howe, CNP

## 2024-07-18 ENCOUNTER — APPOINTMENT (OUTPATIENT)
Facility: HOSPITAL | Age: 89
DRG: 480 | End: 2024-07-18
Payer: MEDICARE

## 2024-07-18 ENCOUNTER — HOSPITAL ENCOUNTER (INPATIENT)
Facility: HOSPITAL | Age: 89
LOS: 12 days | Discharge: NURSING, REHAB OR LONG-TERM CARE | DRG: 480 | End: 2024-07-30
Attending: EMERGENCY MEDICINE | Admitting: HOSPITALIST
Payer: MEDICARE

## 2024-07-18 ENCOUNTER — APPOINTMENT (OUTPATIENT)
Age: 89
End: 2024-07-18
Payer: MEDICARE

## 2024-07-18 ENCOUNTER — HOSPITAL ENCOUNTER (EMERGENCY)
Age: 89
Discharge: ANOTHER HOSPITAL | End: 2024-07-18
Attending: FAMILY MEDICINE
Payer: MEDICARE

## 2024-07-18 VITALS
SYSTOLIC BLOOD PRESSURE: 155 MMHG | HEIGHT: 66 IN | HEART RATE: 59 BPM | BODY MASS INDEX: 27.48 KG/M2 | RESPIRATION RATE: 12 BRPM | DIASTOLIC BLOOD PRESSURE: 86 MMHG | WEIGHT: 171 LBS | OXYGEN SATURATION: 99 % | TEMPERATURE: 98 F

## 2024-07-18 DIAGNOSIS — G25.81 RESTLESS LEG SYNDROME: ICD-10-CM

## 2024-07-18 DIAGNOSIS — I48.0 PAROXYSMAL ATRIAL FIBRILLATION (HCC): ICD-10-CM

## 2024-07-18 DIAGNOSIS — S72.90XA CLOSED FRACTURE OF FEMUR, UNSPECIFIED FRACTURE MORPHOLOGY, UNSPECIFIED LATERALITY, UNSPECIFIED PORTION OF FEMUR, INITIAL ENCOUNTER (HCC): Primary | ICD-10-CM

## 2024-07-18 DIAGNOSIS — S72.342A CLOSED DISPLACED SPIRAL FRACTURE OF SHAFT OF LEFT FEMUR, INITIAL ENCOUNTER (HCC): Primary | ICD-10-CM

## 2024-07-18 PROBLEM — J69.0 ASPIRATION PNEUMONIA (HCC): Status: ACTIVE | Noted: 2022-03-27

## 2024-07-18 PROBLEM — S72.92XA CLOSED FRACTURE OF LEFT FEMUR (HCC): Status: ACTIVE | Noted: 2024-07-18

## 2024-07-18 PROBLEM — J10.1 INFLUENZA A (H1N1): Status: ACTIVE | Noted: 2022-03-27

## 2024-07-18 PROBLEM — I35.0 NONRHEUMATIC AORTIC VALVE STENOSIS: Status: ACTIVE | Noted: 2019-02-28

## 2024-07-18 PROBLEM — I10 ESSENTIAL HYPERTENSION: Status: ACTIVE | Noted: 2024-07-18

## 2024-07-18 PROBLEM — S72.22XA CLOSED LEFT SUBTROCHANTERIC FEMUR FRACTURE, INITIAL ENCOUNTER (HCC): Status: ACTIVE | Noted: 2024-07-18

## 2024-07-18 PROBLEM — M80.051K: Status: ACTIVE | Noted: 2024-07-18

## 2024-07-18 LAB
ALBUMIN SERPL-MCNC: 3.3 G/DL (ref 3.5–5)
ALBUMIN/GLOB SERPL: 1 (ref 1.1–2.2)
ALP SERPL-CCNC: 83 U/L (ref 45–117)
ALT SERPL-CCNC: 19 U/L (ref 12–78)
ANION GAP SERPL CALC-SCNC: 10 MMOL/L (ref 5–15)
AST SERPL W P-5'-P-CCNC: 23 U/L (ref 15–37)
BASOPHILS # BLD: 0 K/UL (ref 0–0.1)
BASOPHILS NFR BLD: 0 % (ref 0–1)
BILIRUB SERPL-MCNC: 0.7 MG/DL (ref 0.2–1)
BUN SERPL-MCNC: 21 MG/DL (ref 6–20)
BUN/CREAT SERPL: 24 (ref 12–20)
CA-I BLD-MCNC: 9.2 MG/DL (ref 8.5–10.1)
CHLORIDE SERPL-SCNC: 95 MMOL/L (ref 97–108)
CO2 SERPL-SCNC: 35 MMOL/L (ref 21–32)
CREAT SERPL-MCNC: 0.86 MG/DL (ref 0.55–1.02)
DIFFERENTIAL METHOD BLD: ABNORMAL
EOSINOPHIL # BLD: 0 K/UL (ref 0–0.4)
EOSINOPHIL NFR BLD: 0 % (ref 0–7)
ERYTHROCYTE [DISTWIDTH] IN BLOOD BY AUTOMATED COUNT: 14.3 % (ref 11.5–14.5)
GLOBULIN SER CALC-MCNC: 3.2 G/DL (ref 2–4)
GLUCOSE SERPL-MCNC: 151 MG/DL (ref 65–100)
HCT VFR BLD AUTO: 33.5 % (ref 35–47)
HGB BLD-MCNC: 11.2 G/DL (ref 11.5–16)
IMM GRANULOCYTES # BLD AUTO: 0 K/UL (ref 0–0.04)
IMM GRANULOCYTES NFR BLD AUTO: 0 % (ref 0–0.5)
INR PPP: 1 (ref 0.9–1.1)
LYMPHOCYTES # BLD: 1 K/UL (ref 0.8–3.5)
LYMPHOCYTES NFR BLD: 10 % (ref 12–49)
MCH RBC QN AUTO: 28.4 PG (ref 26–34)
MCHC RBC AUTO-ENTMCNC: 33.4 G/DL (ref 30–36.5)
MCV RBC AUTO: 85 FL (ref 80–99)
MONOCYTES # BLD: 0.6 K/UL (ref 0–1)
MONOCYTES NFR BLD: 6 % (ref 5–13)
NEUTS SEG # BLD: 9.2 K/UL (ref 1.8–8)
NEUTS SEG NFR BLD: 84 % (ref 32–75)
NRBC # BLD: 0 K/UL (ref 0–0.01)
NRBC BLD-RTO: 0 PER 100 WBC
PLATELET # BLD AUTO: 206 K/UL (ref 150–400)
PMV BLD AUTO: 10 FL (ref 8.9–12.9)
POTASSIUM SERPL-SCNC: 2.9 MMOL/L (ref 3.5–5.1)
PROT SERPL-MCNC: 6.5 G/DL (ref 6.4–8.2)
PROTHROMBIN TIME: 14.1 SEC (ref 11.9–14.6)
RBC # BLD AUTO: 3.94 M/UL (ref 3.8–5.2)
SODIUM SERPL-SCNC: 140 MMOL/L (ref 136–145)
TROPONIN I SERPL HS-MCNC: 26 NG/L (ref 0–51)
WBC # BLD AUTO: 10.9 K/UL (ref 3.6–11)

## 2024-07-18 PROCEDURE — 90715 TDAP VACCINE 7 YRS/> IM: CPT | Performed by: FAMILY MEDICINE

## 2024-07-18 PROCEDURE — 99285 EMERGENCY DEPT VISIT HI MDM: CPT

## 2024-07-18 PROCEDURE — 73502 X-RAY EXAM HIP UNI 2-3 VIEWS: CPT

## 2024-07-18 PROCEDURE — 6370000000 HC RX 637 (ALT 250 FOR IP): Performed by: FAMILY MEDICINE

## 2024-07-18 PROCEDURE — 85610 PROTHROMBIN TIME: CPT

## 2024-07-18 PROCEDURE — 96376 TX/PRO/DX INJ SAME DRUG ADON: CPT

## 2024-07-18 PROCEDURE — 84484 ASSAY OF TROPONIN QUANT: CPT

## 2024-07-18 PROCEDURE — 96375 TX/PRO/DX INJ NEW DRUG ADDON: CPT

## 2024-07-18 PROCEDURE — 6360000002 HC RX W HCPCS: Performed by: FAMILY MEDICINE

## 2024-07-18 PROCEDURE — 90471 IMMUNIZATION ADMIN: CPT | Performed by: FAMILY MEDICINE

## 2024-07-18 PROCEDURE — 96374 THER/PROPH/DIAG INJ IV PUSH: CPT

## 2024-07-18 PROCEDURE — 36415 COLL VENOUS BLD VENIPUNCTURE: CPT

## 2024-07-18 PROCEDURE — 73552 X-RAY EXAM OF FEMUR 2/>: CPT

## 2024-07-18 PROCEDURE — 2580000003 HC RX 258: Performed by: EMERGENCY MEDICINE

## 2024-07-18 PROCEDURE — 93005 ELECTROCARDIOGRAM TRACING: CPT | Performed by: EMERGENCY MEDICINE

## 2024-07-18 PROCEDURE — 71045 X-RAY EXAM CHEST 1 VIEW: CPT

## 2024-07-18 PROCEDURE — 1100000000 HC RM PRIVATE

## 2024-07-18 PROCEDURE — 94761 N-INVAS EAR/PLS OXIMETRY MLT: CPT

## 2024-07-18 PROCEDURE — 6370000000 HC RX 637 (ALT 250 FOR IP): Performed by: EMERGENCY MEDICINE

## 2024-07-18 PROCEDURE — 73562 X-RAY EXAM OF KNEE 3: CPT

## 2024-07-18 PROCEDURE — 85025 COMPLETE CBC W/AUTO DIFF WBC: CPT

## 2024-07-18 PROCEDURE — 80053 COMPREHEN METABOLIC PANEL: CPT

## 2024-07-18 RX ORDER — FENTANYL CITRATE 50 UG/ML
25 INJECTION, SOLUTION INTRAMUSCULAR; INTRAVENOUS
Status: COMPLETED | OUTPATIENT
Start: 2024-07-18 | End: 2024-07-18

## 2024-07-18 RX ORDER — LORAZEPAM 2 MG/ML
0.5 INJECTION INTRAMUSCULAR ONCE
Status: COMPLETED | OUTPATIENT
Start: 2024-07-18 | End: 2024-07-18

## 2024-07-18 RX ORDER — GINSENG 100 MG
CAPSULE ORAL
Status: COMPLETED | OUTPATIENT
Start: 2024-07-18 | End: 2024-07-18

## 2024-07-18 RX ORDER — SODIUM CHLORIDE 9 MG/ML
INJECTION, SOLUTION INTRAVENOUS CONTINUOUS
Status: DISCONTINUED | OUTPATIENT
Start: 2024-07-18 | End: 2024-07-19

## 2024-07-18 RX ORDER — POTASSIUM CHLORIDE 750 MG/1
40 TABLET, EXTENDED RELEASE ORAL ONCE
Status: COMPLETED | OUTPATIENT
Start: 2024-07-18 | End: 2024-07-18

## 2024-07-18 RX ADMIN — LORAZEPAM 0.5 MG: 2 INJECTION INTRAMUSCULAR; INTRAVENOUS at 15:26

## 2024-07-18 RX ADMIN — SODIUM CHLORIDE: 9 INJECTION, SOLUTION INTRAVENOUS at 22:16

## 2024-07-18 RX ADMIN — TETANUS TOXOID, REDUCED DIPHTHERIA TOXOID AND ACELLULAR PERTUSSIS VACCINE, ADSORBED 0.5 ML: 5; 2.5; 8; 8; 2.5 SUSPENSION INTRAMUSCULAR at 13:35

## 2024-07-18 RX ADMIN — FENTANYL CITRATE 25 MCG: 50 INJECTION INTRAMUSCULAR; INTRAVENOUS at 17:57

## 2024-07-18 RX ADMIN — BACITRACIN 1 EACH: 500 OINTMENT TOPICAL at 13:35

## 2024-07-18 RX ADMIN — LORAZEPAM 0.5 MG: 2 INJECTION INTRAMUSCULAR; INTRAVENOUS at 13:36

## 2024-07-18 RX ADMIN — POTASSIUM CHLORIDE 40 MEQ: 750 TABLET, EXTENDED RELEASE ORAL at 22:17

## 2024-07-18 ASSESSMENT — PAIN SCALES - GENERAL: PAINLEVEL_OUTOF10: 5

## 2024-07-18 ASSESSMENT — PAIN DESCRIPTION - ORIENTATION: ORIENTATION: LEFT;UPPER

## 2024-07-18 ASSESSMENT — PAIN - FUNCTIONAL ASSESSMENT: PAIN_FUNCTIONAL_ASSESSMENT: 0-10

## 2024-07-18 ASSESSMENT — PAIN DESCRIPTION - LOCATION: LOCATION: LEG

## 2024-07-18 NOTE — ED NOTES
Family at bedside. Patient moved to [facility] stretcher. Belongings given to patients family.

## 2024-07-18 NOTE — ED NOTES
Pt family member at desk asking for MD to come in, MD arrived to room, pt was shaking uncontrollably   Pt states she has restless legs syndrome, and that normally she gets up and walks to relieve the symptoms   MD ordered VS be taken and will order Ativan to help relieve some symptoms.

## 2024-07-18 NOTE — ED NOTES
Patients daughter in law is on the phone stating that she has arranged private transportation for the patient to a hospital in [facility] and they can be at this facility by [phone] tonight. Daughter in law placed on hold and call transferred to Dr Cantu. Patients friend remains at bedside.

## 2024-07-18 NOTE — ED PROVIDER NOTES
call a ALS truck and call me back    Will give her an additional dose of fentanyl here to help with To drive to [facility].  Patient blood pressure 138 systolic.  At the time EMS is here to take her to [facility]    I have not heard from any family member regarding any further transport to [place]    Amount and/or Complexity of Data Reviewed  Radiology: ordered.    Risk  OTC drugs.  Prescription drug management.            REASSESSMENT     ED Course as of 07/18/24 1750   Thu Jul 18, 2024   1356 Pain and been accepted by Dr. Gorman at [facility], at 1:54 PM. [WS]      ED Course User Index  [WS] Brian Cantu DO         CRITICAL CARE TIME       CONSULTS:  IP CONSULT TO ORTHOPEDIC SURGERY    PROCEDURES:  Unless otherwise noted below, none     Procedures        FINAL IMPRESSION      1. Closed displaced spiral fracture of shaft of left femur, initial encounter ([facility])    2. Restless leg syndrome          DISPOSITION/PLAN   DISPOSITION Decision To Transfer 07/18/2024 01:55:16 PM      PATIENT REFERRED TO:  No follow-up provider specified.    DISCHARGE MEDICATIONS:  New Prescriptions    No medications on file     Controlled Substances Monitoring:          No data to display                (Please note that portions of this note were completed with a voice recognition program.  Efforts were made to edit the dictations but occasionally words are mis-transcribed.)    Brian Cantu DO (electronically signed)  Attending Emergency Physician           Brian Cantu DO  07/18/24 5205

## 2024-07-19 ENCOUNTER — HOSPITAL ENCOUNTER (INPATIENT)
Facility: HOSPITAL | Age: 89
Discharge: HOME | DRG: 480 | End: 2024-07-21
Payer: MEDICARE

## 2024-07-19 ENCOUNTER — ANESTHESIA (OUTPATIENT)
Facility: HOSPITAL | Age: 89
DRG: 480 | End: 2024-07-19
Payer: MEDICARE

## 2024-07-19 ENCOUNTER — ANESTHESIA EVENT (OUTPATIENT)
Facility: HOSPITAL | Age: 89
DRG: 480 | End: 2024-07-19
Payer: MEDICARE

## 2024-07-19 LAB
ALBUMIN SERPL-MCNC: 3 G/DL (ref 3.5–5)
ALBUMIN/GLOB SERPL: 0.9 (ref 1.1–2.2)
ALP SERPL-CCNC: 72 U/L (ref 45–117)
ALT SERPL-CCNC: 19 U/L (ref 12–78)
ANION GAP SERPL CALC-SCNC: 6 MMOL/L (ref 5–15)
ANION GAP SERPL CALC-SCNC: 8 MMOL/L (ref 5–15)
APTT PPP: 35.5 SEC (ref 21.2–34.1)
AST SERPL W P-5'-P-CCNC: 25 U/L (ref 15–37)
BASOPHILS # BLD: 0 K/UL (ref 0–0.1)
BASOPHILS NFR BLD: 0 % (ref 0–1)
BILIRUB SERPL-MCNC: 0.8 MG/DL (ref 0.2–1)
BUN SERPL-MCNC: 18 MG/DL (ref 6–20)
BUN SERPL-MCNC: 19 MG/DL (ref 6–20)
BUN/CREAT SERPL: 24 (ref 12–20)
BUN/CREAT SERPL: 26 (ref 12–20)
CA-I BLD-MCNC: 9 MG/DL (ref 8.5–10.1)
CA-I BLD-MCNC: 9.2 MG/DL (ref 8.5–10.1)
CHLORIDE SERPL-SCNC: 101 MMOL/L (ref 97–108)
CHLORIDE SERPL-SCNC: 99 MMOL/L (ref 97–108)
CO2 SERPL-SCNC: 31 MMOL/L (ref 21–32)
CO2 SERPL-SCNC: 34 MMOL/L (ref 21–32)
CREAT SERPL-MCNC: 0.7 MG/DL (ref 0.55–1.02)
CREAT SERPL-MCNC: 0.78 MG/DL (ref 0.55–1.02)
DIFFERENTIAL METHOD BLD: ABNORMAL
ECHO AO ASC DIAM: 2.4 CM
ECHO AO ASCENDING AORTA INDEX: 1.44 CM/M2
ECHO AO ROOT DIAM: 2.9 CM
ECHO AO ROOT INDEX: 1.74 CM/M2
ECHO AV AREA PEAK VELOCITY: 1.1 CM2
ECHO AV AREA VTI: 1 CM2
ECHO AV AREA/BSA PEAK VELOCITY: 0.7 CM2/M2
ECHO AV AREA/BSA VTI: 0.6 CM2/M2
ECHO AV MEAN GRADIENT: 14 MMHG
ECHO AV MEAN VELOCITY: 1.8 M/S
ECHO AV PEAK GRADIENT: 21 MMHG
ECHO AV PEAK VELOCITY: 2.3 M/S
ECHO AV VELOCITY RATIO: 0.65
ECHO AV VTI: 48 CM
ECHO BSA: 1.66 M2
ECHO EST RA PRESSURE: 3 MMHG
ECHO IVC EXP: 1.5 CM
ECHO LA AREA 2C: 11.5 CM2
ECHO LA DIAMETER INDEX: 1.14 CM/M2
ECHO LA DIAMETER: 1.9 CM
ECHO LA MINOR AXIS: 4.3 CM
ECHO LA TO AORTIC ROOT RATIO: 0.66
ECHO LA VOL MOD A2C: 26 ML (ref 22–52)
ECHO LA VOLUME INDEX MOD A2C: 16 ML/M2 (ref 16–34)
ECHO LV E' LATERAL VELOCITY: 11 CM/S
ECHO LV FRACTIONAL SHORTENING: 54 % (ref 28–44)
ECHO LV INTERNAL DIMENSION DIASTOLE INDEX: 1.56 CM/M2
ECHO LV INTERNAL DIMENSION DIASTOLIC: 2.6 CM (ref 3.9–5.3)
ECHO LV INTERNAL DIMENSION SYSTOLIC INDEX: 0.72 CM/M2
ECHO LV INTERNAL DIMENSION SYSTOLIC: 1.2 CM
ECHO LV IVSD: 1.1 CM (ref 0.6–0.9)
ECHO LV MASS 2D: 72.4 G (ref 67–162)
ECHO LV MASS INDEX 2D: 43.3 G/M2 (ref 43–95)
ECHO LV POSTERIOR WALL DIASTOLIC: 1 CM (ref 0.6–0.9)
ECHO LV RELATIVE WALL THICKNESS RATIO: 0.77
ECHO LVOT AREA: 1.8 CM2
ECHO LVOT AV VTI INDEX: 0.56
ECHO LVOT DIAM: 1.5 CM
ECHO LVOT MEAN GRADIENT: 4 MMHG
ECHO LVOT PEAK GRADIENT: 9 MMHG
ECHO LVOT PEAK VELOCITY: 1.5 M/S
ECHO LVOT STROKE VOLUME INDEX: 28.5 ML/M2
ECHO LVOT SV: 47.5 ML
ECHO LVOT VTI: 26.9 CM
ECHO MV A VELOCITY: 1.12 M/S
ECHO MV E DECELERATION TIME (DT): 278 MS
ECHO MV E VELOCITY: 0.9 M/S
ECHO MV E/A RATIO: 0.8
ECHO MV E/E' LATERAL: 8.18
ECHO MV REGURGITANT PEAK GRADIENT: 16 MMHG
ECHO MV REGURGITANT PEAK VELOCITY: 2 M/S
ECHO PULMONARY ARTERY END DIASTOLIC PRESSURE: 2 MMHG
ECHO PV ACCELERATION TIME (AT): 126 MS
ECHO PV MAX VELOCITY: 1.1 M/S
ECHO PV PEAK GRADIENT: 4 MMHG
ECHO PV REGURGITANT MAX VELOCITY: 0.8 M/S
ECHO RA AREA 4C: 11.7 CM2
ECHO RA END SYSTOLIC VOLUME APICAL 4 CHAMBER INDEX BSA: 13 ML/M2
ECHO RA VOLUME: 22 ML
ECHO RIGHT VENTRICULAR SYSTOLIC PRESSURE (RVSP): 30 MMHG
ECHO RV BASAL DIMENSION: 3.5 CM
ECHO RV FREE WALL PEAK S': 18 CM/S
ECHO RV TAPSE: 2 CM (ref 1.7–?)
ECHO TV REGURGITANT MAX VELOCITY: 2.6 M/S
ECHO TV REGURGITANT PEAK GRADIENT: 27 MMHG
EKG ATRIAL RATE: 69 BPM
EKG DIAGNOSIS: NORMAL
EKG P AXIS: 30 DEGREES
EKG P-R INTERVAL: 202 MS
EKG Q-T INTERVAL: 450 MS
EKG QRS DURATION: 96 MS
EKG QTC CALCULATION (BAZETT): 482 MS
EKG R AXIS: 28 DEGREES
EKG T AXIS: 33 DEGREES
EKG VENTRICULAR RATE: 69 BPM
EOSINOPHIL # BLD: 0 K/UL (ref 0–0.4)
EOSINOPHIL NFR BLD: 0 % (ref 0–7)
ERYTHROCYTE [DISTWIDTH] IN BLOOD BY AUTOMATED COUNT: 14.5 % (ref 11.5–14.5)
FOLATE SERPL-MCNC: 18.2 NG/ML (ref 5–21)
GLOBULIN SER CALC-MCNC: 3.4 G/DL (ref 2–4)
GLUCOSE SERPL-MCNC: 127 MG/DL (ref 65–100)
GLUCOSE SERPL-MCNC: 148 MG/DL (ref 65–100)
HCT VFR BLD AUTO: 27.1 % (ref 35–47)
HGB BLD-MCNC: 9.2 G/DL (ref 11.5–16)
IMM GRANULOCYTES # BLD AUTO: 0 K/UL (ref 0–0.04)
IMM GRANULOCYTES NFR BLD AUTO: 0 % (ref 0–0.5)
IRON SATN MFR SERPL: 12 % (ref 20–50)
IRON SERPL-MCNC: 26 UG/DL (ref 35–150)
LYMPHOCYTES # BLD: 1.1 K/UL (ref 0.8–3.5)
LYMPHOCYTES NFR BLD: 12 % (ref 12–49)
MCH RBC QN AUTO: 28.9 PG (ref 26–34)
MCHC RBC AUTO-ENTMCNC: 33.9 G/DL (ref 30–36.5)
MCV RBC AUTO: 85.2 FL (ref 80–99)
MONOCYTES # BLD: 0.7 K/UL (ref 0–1)
MONOCYTES NFR BLD: 7 % (ref 5–13)
NEUTS SEG # BLD: 7.4 K/UL (ref 1.8–8)
NEUTS SEG NFR BLD: 81 % (ref 32–75)
NRBC # BLD: 0 K/UL (ref 0–0.01)
NRBC BLD-RTO: 0 PER 100 WBC
PHOSPHATE SERPL-MCNC: 2.7 MG/DL (ref 2.6–4.7)
PLATELET # BLD AUTO: 205 K/UL (ref 150–400)
PMV BLD AUTO: 10.3 FL (ref 8.9–12.9)
POTASSIUM SERPL-SCNC: 2.7 MMOL/L (ref 3.5–5.1)
POTASSIUM SERPL-SCNC: 2.8 MMOL/L (ref 3.5–5.1)
PROT SERPL-MCNC: 6.4 G/DL (ref 6.4–8.2)
RBC # BLD AUTO: 3.18 M/UL (ref 3.8–5.2)
SODIUM SERPL-SCNC: 139 MMOL/L (ref 136–145)
SODIUM SERPL-SCNC: 140 MMOL/L (ref 136–145)
THERAPEUTIC RANGE: ABNORMAL SEC (ref 82–109)
TIBC SERPL-MCNC: 216 UG/DL (ref 250–450)
VIT B12 SERPL-MCNC: 949 PG/ML (ref 193–986)
WBC # BLD AUTO: 9.3 K/UL (ref 3.6–11)

## 2024-07-19 PROCEDURE — 6370000000 HC RX 637 (ALT 250 FOR IP): Performed by: HOSPITALIST

## 2024-07-19 PROCEDURE — 85025 COMPLETE CBC W/AUTO DIFF WBC: CPT

## 2024-07-19 PROCEDURE — 80048 BASIC METABOLIC PNL TOTAL CA: CPT

## 2024-07-19 PROCEDURE — 80053 COMPREHEN METABOLIC PANEL: CPT

## 2024-07-19 PROCEDURE — 36415 COLL VENOUS BLD VENIPUNCTURE: CPT

## 2024-07-19 PROCEDURE — 1100000000 HC RM PRIVATE

## 2024-07-19 PROCEDURE — 93308 TTE F-UP OR LMTD: CPT

## 2024-07-19 PROCEDURE — 86850 RBC ANTIBODY SCREEN: CPT

## 2024-07-19 PROCEDURE — 6360000002 HC RX W HCPCS: Performed by: HOSPITALIST

## 2024-07-19 PROCEDURE — 82306 VITAMIN D 25 HYDROXY: CPT

## 2024-07-19 PROCEDURE — 84100 ASSAY OF PHOSPHORUS: CPT

## 2024-07-19 PROCEDURE — 82607 VITAMIN B-12: CPT

## 2024-07-19 PROCEDURE — 85730 THROMBOPLASTIN TIME PARTIAL: CPT

## 2024-07-19 PROCEDURE — 86900 BLOOD TYPING SEROLOGIC ABO: CPT

## 2024-07-19 PROCEDURE — 86923 COMPATIBILITY TEST ELECTRIC: CPT

## 2024-07-19 PROCEDURE — 2580000003 HC RX 258: Performed by: HOSPITALIST

## 2024-07-19 PROCEDURE — 83540 ASSAY OF IRON: CPT

## 2024-07-19 PROCEDURE — 94761 N-INVAS EAR/PLS OXIMETRY MLT: CPT

## 2024-07-19 PROCEDURE — 86901 BLOOD TYPING SEROLOGIC RH(D): CPT

## 2024-07-19 PROCEDURE — 82746 ASSAY OF FOLIC ACID SERUM: CPT

## 2024-07-19 RX ORDER — PANTOPRAZOLE SODIUM 20 MG/1
20 TABLET, DELAYED RELEASE ORAL DAILY
Status: DISCONTINUED | OUTPATIENT
Start: 2024-07-19 | End: 2024-07-30 | Stop reason: HOSPADM

## 2024-07-19 RX ORDER — MAGNESIUM SULFATE IN WATER 40 MG/ML
2000 INJECTION, SOLUTION INTRAVENOUS PRN
Status: DISCONTINUED | OUTPATIENT
Start: 2024-07-19 | End: 2024-07-30 | Stop reason: HOSPADM

## 2024-07-19 RX ORDER — SODIUM CHLORIDE 0.9 % (FLUSH) 0.9 %
5-40 SYRINGE (ML) INJECTION EVERY 12 HOURS SCHEDULED
Status: DISCONTINUED | OUTPATIENT
Start: 2024-07-19 | End: 2024-07-30 | Stop reason: HOSPADM

## 2024-07-19 RX ORDER — DULOXETIN HYDROCHLORIDE 20 MG/1
20 CAPSULE, DELAYED RELEASE ORAL DAILY
Status: DISCONTINUED | OUTPATIENT
Start: 2024-07-19 | End: 2024-07-30 | Stop reason: HOSPADM

## 2024-07-19 RX ORDER — POTASSIUM CHLORIDE 7.45 MG/ML
10 INJECTION INTRAVENOUS PRN
Status: DISCONTINUED | OUTPATIENT
Start: 2024-07-19 | End: 2024-07-30 | Stop reason: HOSPADM

## 2024-07-19 RX ORDER — SODIUM CHLORIDE 0.9 % (FLUSH) 0.9 %
5-40 SYRINGE (ML) INJECTION PRN
Status: DISCONTINUED | OUTPATIENT
Start: 2024-07-19 | End: 2024-07-30 | Stop reason: HOSPADM

## 2024-07-19 RX ORDER — POLYETHYLENE GLYCOL 3350 17 G/17G
17 POWDER, FOR SOLUTION ORAL DAILY
Status: DISCONTINUED | OUTPATIENT
Start: 2024-07-19 | End: 2024-07-28

## 2024-07-19 RX ORDER — ATORVASTATIN CALCIUM 40 MG/1
40 TABLET, FILM COATED ORAL DAILY
Status: DISCONTINUED | OUTPATIENT
Start: 2024-07-19 | End: 2024-07-30 | Stop reason: HOSPADM

## 2024-07-19 RX ORDER — FENTANYL 50 UG/1
1 PATCH TRANSDERMAL
Status: DISCONTINUED | OUTPATIENT
Start: 2024-07-19 | End: 2024-07-30 | Stop reason: HOSPADM

## 2024-07-19 RX ORDER — ONDANSETRON 4 MG/1
4 TABLET, ORALLY DISINTEGRATING ORAL EVERY 8 HOURS PRN
Status: DISCONTINUED | OUTPATIENT
Start: 2024-07-19 | End: 2024-07-30 | Stop reason: HOSPADM

## 2024-07-19 RX ORDER — LEVOTHYROXINE SODIUM 25 UG/1
50 TABLET ORAL DAILY
Status: DISCONTINUED | OUTPATIENT
Start: 2024-07-19 | End: 2024-07-30 | Stop reason: HOSPADM

## 2024-07-19 RX ORDER — POTASSIUM CHLORIDE 1500 MG/1
40 TABLET, EXTENDED RELEASE ORAL PRN
Status: DISCONTINUED | OUTPATIENT
Start: 2024-07-19 | End: 2024-07-30 | Stop reason: HOSPADM

## 2024-07-19 RX ORDER — ENOXAPARIN SODIUM 100 MG/ML
40 INJECTION SUBCUTANEOUS DAILY
Status: DISCONTINUED | OUTPATIENT
Start: 2024-07-19 | End: 2024-07-24

## 2024-07-19 RX ORDER — ROPINIROLE 2 MG/1
12 TABLET, FILM COATED, EXTENDED RELEASE ORAL DAILY
Status: DISCONTINUED | OUTPATIENT
Start: 2024-07-19 | End: 2024-07-30 | Stop reason: HOSPADM

## 2024-07-19 RX ORDER — GABAPENTIN 100 MG/1
100 CAPSULE ORAL 3 TIMES DAILY
Status: DISCONTINUED | OUTPATIENT
Start: 2024-07-19 | End: 2024-07-30 | Stop reason: HOSPADM

## 2024-07-19 RX ORDER — METOPROLOL TARTRATE 50 MG
50 TABLET ORAL 2 TIMES DAILY
Status: DISCONTINUED | OUTPATIENT
Start: 2024-07-19 | End: 2024-07-23

## 2024-07-19 RX ORDER — ONDANSETRON 2 MG/ML
4 INJECTION INTRAMUSCULAR; INTRAVENOUS EVERY 6 HOURS PRN
Status: DISCONTINUED | OUTPATIENT
Start: 2024-07-19 | End: 2024-07-30 | Stop reason: HOSPADM

## 2024-07-19 RX ORDER — SODIUM CHLORIDE 9 MG/ML
INJECTION, SOLUTION INTRAVENOUS PRN
Status: DISCONTINUED | OUTPATIENT
Start: 2024-07-19 | End: 2024-07-30 | Stop reason: HOSPADM

## 2024-07-19 RX ORDER — ACETAMINOPHEN 500 MG
1000 TABLET ORAL 3 TIMES DAILY
Status: DISCONTINUED | OUTPATIENT
Start: 2024-07-19 | End: 2024-07-30 | Stop reason: HOSPADM

## 2024-07-19 RX ORDER — SODIUM CHLORIDE 9 MG/ML
INJECTION, SOLUTION INTRAVENOUS CONTINUOUS
Status: DISCONTINUED | OUTPATIENT
Start: 2024-07-19 | End: 2024-07-25

## 2024-07-19 RX ORDER — CALCIUM CARBONATE 500 MG/1
500 TABLET, CHEWABLE ORAL 2 TIMES DAILY
Status: DISCONTINUED | OUTPATIENT
Start: 2024-07-19 | End: 2024-07-30 | Stop reason: HOSPADM

## 2024-07-19 RX ADMIN — ACETAMINOPHEN 1000 MG: 500 TABLET ORAL at 14:33

## 2024-07-19 RX ADMIN — HYDROMORPHONE HYDROCHLORIDE 0.5 MG: 0.5 INJECTION, SOLUTION INTRAMUSCULAR; INTRAVENOUS; SUBCUTANEOUS at 18:01

## 2024-07-19 RX ADMIN — SODIUM CHLORIDE: 9 INJECTION, SOLUTION INTRAVENOUS at 02:01

## 2024-07-19 RX ADMIN — POTASSIUM CHLORIDE 10 MEQ: 7.45 INJECTION INTRAVENOUS at 10:24

## 2024-07-19 RX ADMIN — SODIUM CHLORIDE, PRESERVATIVE FREE 10 ML: 5 INJECTION INTRAVENOUS at 20:01

## 2024-07-19 RX ADMIN — CALCIUM CARBONATE 500 MG: 500 TABLET, CHEWABLE ORAL at 20:00

## 2024-07-19 RX ADMIN — POTASSIUM CHLORIDE 10 MEQ: 7.45 INJECTION INTRAVENOUS at 16:30

## 2024-07-19 RX ADMIN — GABAPENTIN 100 MG: 100 CAPSULE ORAL at 20:01

## 2024-07-19 RX ADMIN — SODIUM CHLORIDE: 9 INJECTION, SOLUTION INTRAVENOUS at 17:46

## 2024-07-19 RX ADMIN — METOPROLOL TARTRATE 50 MG: 50 TABLET, FILM COATED ORAL at 20:01

## 2024-07-19 RX ADMIN — POTASSIUM CHLORIDE 10 MEQ: 7.45 INJECTION INTRAVENOUS at 09:22

## 2024-07-19 RX ADMIN — POTASSIUM CHLORIDE 10 MEQ: 7.45 INJECTION INTRAVENOUS at 15:33

## 2024-07-19 RX ADMIN — GABAPENTIN 100 MG: 100 CAPSULE ORAL at 14:33

## 2024-07-19 RX ADMIN — LEVOTHYROXINE SODIUM 50 MCG: 0.03 TABLET ORAL at 05:57

## 2024-07-19 RX ADMIN — SODIUM CHLORIDE, PRESERVATIVE FREE 10 ML: 5 INJECTION INTRAVENOUS at 09:23

## 2024-07-19 RX ADMIN — POTASSIUM CHLORIDE 10 MEQ: 7.45 INJECTION INTRAVENOUS at 14:27

## 2024-07-19 RX ADMIN — METOPROLOL TARTRATE 50 MG: 50 TABLET, FILM COATED ORAL at 02:01

## 2024-07-19 RX ADMIN — POTASSIUM CHLORIDE 10 MEQ: 7.45 INJECTION INTRAVENOUS at 13:32

## 2024-07-19 RX ADMIN — ACETAMINOPHEN 1000 MG: 500 TABLET ORAL at 20:00

## 2024-07-19 ASSESSMENT — PAIN SCALES - GENERAL
PAINLEVEL_OUTOF10: 0
PAINLEVEL_OUTOF10: 4
PAINLEVEL_OUTOF10: 7
PAINLEVEL_OUTOF10: 5
PAINLEVEL_OUTOF10: 0
PAINLEVEL_OUTOF10: 5
PAINLEVEL_OUTOF10: 2

## 2024-07-19 ASSESSMENT — PAIN DESCRIPTION - LOCATION
LOCATION: BACK
LOCATION: LEG;HIP
LOCATION: LEG
LOCATION: LEG

## 2024-07-19 ASSESSMENT — PAIN DESCRIPTION - ORIENTATION
ORIENTATION: LEFT
ORIENTATION: RIGHT
ORIENTATION: LEFT
ORIENTATION: LEFT

## 2024-07-19 ASSESSMENT — PAIN DESCRIPTION - DESCRIPTORS
DESCRIPTORS: ACHING
DESCRIPTORS: ACHING

## 2024-07-19 ASSESSMENT — PAIN - FUNCTIONAL ASSESSMENT: PAIN_FUNCTIONAL_ASSESSMENT: ACTIVITIES ARE NOT PREVENTED

## 2024-07-19 NOTE — ASSESSMENT & PLAN NOTE
- Acute displaced distal femur shaft fracture observed on X-Ray s/p fall  - NWB status initiated with additional therapy options pending Ortho eval  - Titrate symptom control PRN

## 2024-07-19 NOTE — CARE COORDINATION
07/19/24 0955   Service Assessment   Patient Orientation Alert and Oriented   Cognition Alert   History Provided By Child/Family   Primary Caregiver Self   Support Systems Children   Patient's Healthcare Decision Maker is: Legal Next of Kin   PCP Verified by CM Yes   Last Visit to PCP Within last 3 months   Prior Functional Level Independent in ADLs/IADLs   Current Functional Level Mobility;Assistance with the following:   Can patient return to prior living arrangement Unknown at present   Ability to make needs known: Good   Family able to assist with home care needs: Yes   Would you like for me to discuss the discharge plan with any other family members/significant others, and if so, who? Yes  (Children: Jovanni and Noni)   Financial Resources Medicare   Community Resources None   Social/Functional History   Lives With Alone   Type of Home House   Home Layout Two level  (2 steps to the entrance)   Home Equipment Walker - Rolling   Services At/After Discharge   Transition of Care Consult (CM Consult) Discharge Planning   Confirm Follow Up Transport Family     0955: CM met with patient, son (Jovanni) and DIL (Marquita) at bedside to complete DCP assessment. Demos verified as accurate. Patient lives alone in a two story home with two steps to the entrance. She does not utilize the upper level. Prior to admission, patient was independent with ADLS. She has a rolling walker, but does not use it. Prior home health after knee surgery years ago. Preferred pharmacy for new medications verified as [facility]. Patient reports her PCP being Dr. Hansen at [facility]. CM unable to locate physician at this center in system. Choice letter received, placed on chart and referral sent as well as internally faxed to [facility] per family request (address verified with family) and pending therapy recs. SNF : [facility] [phone]. CM will continue to follow patient and recs of medical  team.    Current Dispo: Pending therapy recs.    Advance Care Planning     General Advance Care Planning (ACP) Conversation    Date of Conversation: 7/19/2024  Conducted with: Patient with Decision Making Capacity  Other persons present: Eleazar Gómez    Healthcare Decision Maker: No healthcare decision makers have been documented.  Click here to complete HealthCare Decision Makers including selection of the Healthcare Decision Maker Relationship (ie \"Primary\")       Content/Action Overview:  Has NO ACP documents-Information provided  Reviewed DNR/DNI and patient elects Full Code (Attempt Resuscitation)        Length of Voluntary ACP Conversation in minutes:  <16 minutes (Non-Billable)    ANGELES RIOS

## 2024-07-19 NOTE — ED NOTES
ED TO INPATIENT SBAR HANDOFF    Patient Name: Jericho Gómez   Preferred Name: Jericho  : 1935  89 y.o.   Family/Caregiver Present: no   Code Status Order: Prior  PO Status: NPO after midnight  Telemetry Order:   C-SSRS: Risk of Suicide: No Risk  Sitter none  Restraints:     Sepsis Risk Score      Situation  Chief Complaint   Patient presents with    Leg Injury     Brief Description of Patient's Condition: Patient arrived as a transfer for ortho from [facility]. Patient has a closed fracture of the left femur. Patient is from [place]. Patient was given a fentanyl patch, IV fentanyl, and ativan PTA to our hospital so patient has been pleasantly sleeping the entire time here. Patient has purewick in place. Patient had po potassium here.    Mental Status: oriented, alert, coherent, logical, thought processes intact, and able to concentrate and follow conversation  Arrived from:Home  Imaging:   XR FEMUR LEFT (MIN 2 VIEWS)   Final Result   Acute displaced distal femur shaft fracture as above.         Electronically signed by Jean Marie Merida      XR CHEST PORTABLE   Final Result      No acute process on portable chest.         Electronically signed by David Trujillo MD        Abnormal labs:   Abnormal Labs Reviewed   CBC WITH AUTO DIFFERENTIAL - Abnormal; Notable for the following components:       Result Value    Hemoglobin 11.2 (*)     Hematocrit 33.5 (*)     Neutrophils % 84 (*)     Lymphocytes % 10 (*)     Neutrophils Absolute 9.2 (*)     All other components within normal limits   COMPREHENSIVE METABOLIC PANEL - Abnormal; Notable for the following components:    Potassium 2.9 (*)     Chloride 95 (*)     CO2 35 (*)     Glucose 151 (*)     BUN 21 (*)     BUN/Creatinine Ratio 24 (*)     Albumin 3.3 (*)     Albumin/Globulin Ratio 1.0 (*)     All other components within normal limits       Background  Allergies: No Known Allergies  History:   Past Medical History:   Diagnosis Date     medication administration: Fentanyl @ 1757.  Pertinent or High Risk Medications/Drips: no   If Yes, please provide details: none  Blood Product Administration: no  If Yes, please provide details: none  Process Protocols/Bundles: none    Recommendation  Incomplete STAT orders: none  Overdue Medications: none  Patient Belongings:    Additional Comments: none  If any further questions, please call Sending RN at [phone].      Admitting Unit Notification  Name of person notified and time: Gill @ 0020.      Electronically signed by: Electronically signed by Shruthi Aragon RN on 7/19/2024 at 12:16 AM

## 2024-07-19 NOTE — PROGRESS NOTES
Hospitalist follow-up      Patient evaluated by me, other than pain no complaints    Brief exam unremarkable  EKG SR 60 no acute changes    Reviewed labs, K low, currently replacing    Revised Cardiac Risk Index score is 0  Echo performed and results pending    After reviewing test results, patient history and my exam, patient is medically optimized for surgery provided potassium is within acceptable limit. There is no indication for any further cardiac evaluation prior to surgery

## 2024-07-19 NOTE — CONSULTS
ORTHOPEDIC CONSULT    Patient: Jericho Gómez MRN: [id number]  SSN: [id number]    YOB: 1935  Age: 89 y.o.  Sex: female      Subjective:      Jericho Gómez is a 89 y.o. female who is being seen for left distal femur fracture following a fall. Patient seen at bedside with family present. They report the patient went out to the [facility] yesterday and fell while going up the stairs injuring her left leg. The family states she has had several falls in the past, two falls which resulted in wrist fractures one of which underwent surgical fixation by Dr. Ramirez in [place], [place]. The patient is from [place] and receives most of her care there. However, following the fall yesterday the family wanted the patient to go to [facility], in hopes that Dr. Ramirez could do surgery for her mother as he fixed her wrist during a previous fall. While at [facility] ED X-rays were obtained and revealed a distal femoral fracture. It was recommended by Dr. Ramirez the patient be sent to [facility] for a higher level of care. Patient's family wanted the patient to be transported to [place], [place] but unfortunately were unable to. Orthopedics here consulted for further evaluation and management.     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 Family states patient lives alone and is independent with her ADLs. She rarely uses a walker but has one at home.    Denies N/V, fever, chills, chest pain, abdominal pain, numbness/tingling, or pain elsewhere other than the left leg.     Past Medical History:   Diagnosis Date    Age-related osteoporosis without current pathological fracture     Aspiration pneumonia (HCC) 03/27/2022    B12 deficiency 05/10/2011    Essential hypertension     Hypothyroidism (acquired) 09/26/2011    Influenza A (H1N1) 03/27/2022    Mixed hyperlipidemia     Nonrheumatic aortic valve stenosis 02/28/2019    Paroxysmal  PO pain management     Dr. Martini is aware and agrees with the plan.        Signed By: Krystle Ceballos PA-C     July 19, 2024

## 2024-07-19 NOTE — PROGRESS NOTES
4 Eyes Skin Assessment     NAME:  Jericho Gómez  YOB: 1935  MEDICAL RECORD NUMBER:  [id number]    The patient is being assessed for  Admission    I agree that at least one RN has performed a thorough Head to Toe Skin Assessment on the patient. ALL assessment sites listed below have been assessed.      Areas assessed by both nurses:    Head, Face, Ears, Shoulders, Back, Chest, Arms, Elbows, Hands, Sacrum. Buttock, Coccyx, Ischium, Legs. Feet and Heels, and Under Medical Devices         Does the Patient have a Wound? No noted wound(s)       Chago Prevention initiated by RN: No  Wound Care Orders initiated by RN: No    Pressure Injury (Stage 3,4, Unstageable, DTI, NWPT, and Complex wounds) if present, place Wound referral order by RN under : No    New Ostomies, if present place, Ostomy referral order under : No     Nurse 1 eSignature: Electronically signed by Mor Deng RN on 7/19/24 at 2:22 AM EDT    **SHARE this note so that the co-signing nurse can place an eSignature**    Nurse 2 eSignature: Electronically signed by Gill Colon RN on 7/19/24 at 2:32 AM EDT

## 2024-07-19 NOTE — H&P
V2.0  History and Physical      Name:  Jericho Gómez /Age/Sex: 1935  (89 y.o. female)   MRN & CSN:  [id number] & [id number] Encounter Date/Time: 24   Location:  ER11/11 PCP: Unknown, Provider, APRN - NP       Hospital Day: 2    Assessment and Plan:   Jericho Gómez is a 89 y.o. 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 2024 Yes    Closed fracture of left femur (HCC) 2024 Yes    Essential hypertension 2024 Yes     Plan:  Closed fracture of left femur (HCC)  - Acute displaced distal femur shaft fracture observed on X-Ray s/p fall  - NWB status initiated with additional therapy options pending Ortho eval  - Titrate symptom control PRN    Essential hypertension  - Chronic presentation w/ SBP stable & home regimen reviewed  - Titrate for goal SBP<140     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 female with pmh of HTN and PAF who presented to the hospital from free standing ED w/ an acute femur fracture.  The pt suffered a traumatic mechanical fall while walking up a set of concrete steps.  Post fall immediate pain was described and pt was unable to stand      Hepatic:   Recent Labs     07/18/24 2118   AST 23   ALT 19   BILITOT 0.7   ALKPHOS 83     Lipids: No results found for: \"CHOL\", \"HDL\", \"TRIG\"  Hemoglobin A1C: No results found for: \"LABA1C\"  TSH: No results found for: \"TSH\"  Troponin: No results found for: \"TROPONINT\"  Lactic Acid: No results for input(s): \"LACTA\" in the last 72 hours.  BNP: No results for input(s): \"PROBNP\" in the last 72 hours.  UA:No results found for: \"NITRU\", \"COLORU\", \"PHUR\", \"LABCAST\", \"WBCUA\", \"RBCUA\", \"MUCUS\", \"TRICHOMONAS\", \"YEAST\", \"BACTERIA\", \"CLARITYU\", \"SPECGRAV\", \"LEUKOCYTESUR\", \"UROBILINOGEN\", \"BILIRUBINUR\", \"BLOODU\", \"GLUCOSEU\", \"KETUA\", \"AMORPHOUS\"  Urine Cultures: No results found for: \"LABURIN\"  Blood Cultures: No results found for: \"BC\"  No results found for: \"BLOODCULT2\"  Organism: No results found for: \"ORG\"    Imaging/Diagnostics Last 24 Hours   XR FEMUR LEFT (MIN 2 VIEWS)    Result Date: 7/18/2024  INDICATION:   trauma Unspecified fracture of unspecified femur, initial encounter for closed fracture / Reason for exam:->trauma COMPARISON: None FINDINGS: 2 views of the left femur demonstrate acute, displaced distal femur shaft fracture, with one full shaft width posterior displacement of the distal fragment. There is a knee arthroplasty in place. Proximal femur is intact.     Acute displaced distal femur shaft fracture as above. Electronically signed by Jean Marie Merida    XR CHEST PORTABLE    Result Date: 7/18/2024  EXAM:  XR CHEST PORTABLE INDICATION: preop, fall with fracture COMPARISON: none TECHNIQUE: portable chest AP view FINDINGS: The cardiac silhouette is within normal limits. The pulmonary vasculature is within normal limits. The lungs and pleural spaces are clear. The visualized bones and upper abdomen are age-appropriate.     No acute process on portable chest. Electronically signed by David Trujillo MD    XR KNEE LEFT (3 VIEWS)    Result Date: 7/18/2024  EXAM: XR KNEE LEFT (3 VIEWS) INDICATION: Fall, pain with

## 2024-07-19 NOTE — ASSESSMENT & PLAN NOTE
- Reported hx based on DEXA scan  - Continue scheduled Oscal+D with additional outpt Rheum follow up recommended

## 2024-07-19 NOTE — ED PROVIDER NOTES
[facility] EMERGENCY DEPT  EMERGENCY DEPARTMENT HISTORY AND PHYSICAL EXAM      Date: 7/18/2024  Patient Name: Jericho Gómez  MRN: [id number]  Birthdate 1935  Date of evaluation: 7/18/2024  Provider: Scooby Panda MD   Note Started: 8:03 PM EDT 7/18/24    HISTORY OF PRESENT ILLNESS     Chief Complaint   Patient presents with    Leg Injury       History Provided By: Patient    HPI: Jericho Gómez is a 89 y.o. female presents from outside hospital with a diagnosis of spiral femur fracture.  Patient was subsequently transferred to this facility.  Orthopedics has not been discussed at this point.  DP and PT pulses intact prior to transfer.  Patient denies any other injuries at this point.    PAST MEDICAL HISTORY   Past Medical History:  Past Medical History:   Diagnosis Date    Age-related osteoporosis without current pathological fracture     Aspiration pneumonia (HCC) 03/27/2022    B12 deficiency 05/10/2011    Essential hypertension     Hypothyroidism (acquired) 09/26/2011    Influenza A (H1N1) 03/27/2022    Mixed hyperlipidemia     Nonrheumatic aortic valve stenosis 02/28/2019    Paroxysmal atrial fibrillation (HCC) 03/27/2022    Formatting of this note might be different from the original.   Formatting of this note might be different from the original.   Takes Eliquis and Lopressor  Formatting of this note might be different from the original.   Takes Eliquis and Lopressor    Spinal stenosis of lumbar region with neurogenic claudication 01/28/2015       Past Surgical History:  Past Surgical History:   Procedure Laterality Date    CHOLECYSTECTOMY      HYSTERECTOMY (CERVIX STATUS UNKNOWN)      WRIST CLOSED REDUCTION Left 11/6/2023    LEFT WRIST CLOSED REDUCTION AND PINNING performed by Elijah Ramirez MD at [facility]       Family History:  Family History   Problem Relation Age of Onset    No Known Problems Mother     No Known Problems Father        Social History:  Social History     Tobacco Use     inadvertently transcribed by the computer software. Please disregards these errors. Please excuse any errors that have escaped final proofreading.)     Scooby Panda MD  07/21/24 2017

## 2024-07-20 ENCOUNTER — APPOINTMENT (OUTPATIENT)
Facility: HOSPITAL | Age: 89
DRG: 480 | End: 2024-07-20
Payer: MEDICARE

## 2024-07-20 LAB
ANION GAP SERPL CALC-SCNC: 4 MMOL/L (ref 5–15)
BUN SERPL-MCNC: 15 MG/DL (ref 6–20)
BUN/CREAT SERPL: 21 (ref 12–20)
CA-I BLD-MCNC: 8 MG/DL (ref 8.5–10.1)
CHLORIDE SERPL-SCNC: 104 MMOL/L (ref 97–108)
CO2 SERPL-SCNC: 31 MMOL/L (ref 21–32)
CREAT SERPL-MCNC: 0.72 MG/DL (ref 0.55–1.02)
GLUCOSE BLD STRIP.AUTO-MCNC: 131 MG/DL (ref 65–100)
GLUCOSE SERPL-MCNC: 150 MG/DL (ref 65–100)
PERFORMED BY:: ABNORMAL
POTASSIUM SERPL-SCNC: 2.8 MMOL/L (ref 3.5–5.1)
POTASSIUM SERPL-SCNC: 3.2 MMOL/L (ref 3.5–5.1)
SODIUM SERPL-SCNC: 139 MMOL/L (ref 136–145)

## 2024-07-20 PROCEDURE — 73552 X-RAY EXAM OF FEMUR 2/>: CPT

## 2024-07-20 PROCEDURE — 7100000001 HC PACU RECOVERY - ADDTL 15 MIN: Performed by: ORTHOPAEDIC SURGERY

## 2024-07-20 PROCEDURE — 80048 BASIC METABOLIC PNL TOTAL CA: CPT

## 2024-07-20 PROCEDURE — 2720000010 HC SURG SUPPLY STERILE: Performed by: ORTHOPAEDIC SURGERY

## 2024-07-20 PROCEDURE — 2580000003 HC RX 258: Performed by: NURSE ANESTHETIST, CERTIFIED REGISTERED

## 2024-07-20 PROCEDURE — 3600000014 HC SURGERY LEVEL 4 ADDTL 15MIN: Performed by: ORTHOPAEDIC SURGERY

## 2024-07-20 PROCEDURE — C1713 ANCHOR/SCREW BN/BN,TIS/BN: HCPCS | Performed by: ORTHOPAEDIC SURGERY

## 2024-07-20 PROCEDURE — 0QSC06Z REPOSITION LEFT LOWER FEMUR WITH INTRAMEDULLARY INTERNAL FIXATION DEVICE, OPEN APPROACH: ICD-10-PCS | Performed by: ORTHOPAEDIC SURGERY

## 2024-07-20 PROCEDURE — 6370000000 HC RX 637 (ALT 250 FOR IP): Performed by: HOSPITALIST

## 2024-07-20 PROCEDURE — 2580000003 HC RX 258: Performed by: ORTHOPAEDIC SURGERY

## 2024-07-20 PROCEDURE — 7100000000 HC PACU RECOVERY - FIRST 15 MIN: Performed by: ORTHOPAEDIC SURGERY

## 2024-07-20 PROCEDURE — 6360000002 HC RX W HCPCS: Performed by: NURSE ANESTHETIST, CERTIFIED REGISTERED

## 2024-07-20 PROCEDURE — 82962 GLUCOSE BLOOD TEST: CPT

## 2024-07-20 PROCEDURE — 2500000003 HC RX 250 WO HCPCS: Performed by: NURSE ANESTHETIST, CERTIFIED REGISTERED

## 2024-07-20 PROCEDURE — 6360000002 HC RX W HCPCS: Performed by: ORTHOPAEDIC SURGERY

## 2024-07-20 PROCEDURE — 3700000001 HC ADD 15 MINUTES (ANESTHESIA): Performed by: ORTHOPAEDIC SURGERY

## 2024-07-20 PROCEDURE — 1100000000 HC RM PRIVATE

## 2024-07-20 PROCEDURE — 6360000002 HC RX W HCPCS: Performed by: HOSPITALIST

## 2024-07-20 PROCEDURE — 76000 FLUOROSCOPY <1 HR PHYS/QHP: CPT

## 2024-07-20 PROCEDURE — 3700000000 HC ANESTHESIA ATTENDED CARE: Performed by: ORTHOPAEDIC SURGERY

## 2024-07-20 PROCEDURE — 3600000004 HC SURGERY LEVEL 4 BASE: Performed by: ORTHOPAEDIC SURGERY

## 2024-07-20 PROCEDURE — 6370000000 HC RX 637 (ALT 250 FOR IP): Performed by: ORTHOPAEDIC SURGERY

## 2024-07-20 PROCEDURE — 2580000003 HC RX 258: Performed by: HOSPITALIST

## 2024-07-20 PROCEDURE — 36415 COLL VENOUS BLD VENIPUNCTURE: CPT

## 2024-07-20 PROCEDURE — C1769 GUIDE WIRE: HCPCS | Performed by: ORTHOPAEDIC SURGERY

## 2024-07-20 PROCEDURE — 6360000002 HC RX W HCPCS: Performed by: ANESTHESIOLOGY

## 2024-07-20 PROCEDURE — 2700000000 HC OXYGEN THERAPY PER DAY

## 2024-07-20 PROCEDURE — 84132 ASSAY OF SERUM POTASSIUM: CPT

## 2024-07-20 PROCEDURE — 94761 N-INVAS EAR/PLS OXIMETRY MLT: CPT

## 2024-07-20 PROCEDURE — 2709999900 HC NON-CHARGEABLE SUPPLY: Performed by: ORTHOPAEDIC SURGERY

## 2024-07-20 DEVICE — IMPLANTABLE DEVICE: Type: IMPLANTABLE DEVICE | Site: HIP | Status: FUNCTIONAL

## 2024-07-20 DEVICE — SCREW BNE LCK 5X76 MM LP TI STRL RFNADVANCED 04045376S: Type: IMPLANTABLE DEVICE | Site: HIP | Status: FUNCTIONAL

## 2024-07-20 DEVICE — SCREW BNE LCK 5X34 MM LP XL25 RETROGRADE STRL RFNADVANCED: Type: IMPLANTABLE DEVICE | Site: HIP | Status: FUNCTIONAL

## 2024-07-20 DEVICE — SCREW BNE LCK 5X36 MM LP XL25 RETROGRADE STRL RFNADVANCED: Type: IMPLANTABLE DEVICE | Site: HIP | Status: FUNCTIONAL

## 2024-07-20 RX ORDER — TRANEXAMIC ACID 100 MG/ML
INJECTION, SOLUTION INTRAVENOUS PRN
Status: DISCONTINUED | OUTPATIENT
Start: 2024-07-20 | End: 2024-07-20 | Stop reason: SDUPTHER

## 2024-07-20 RX ORDER — HYDRALAZINE HYDROCHLORIDE 20 MG/ML
10 INJECTION INTRAMUSCULAR; INTRAVENOUS
Status: DISCONTINUED | OUTPATIENT
Start: 2024-07-20 | End: 2024-07-20 | Stop reason: HOSPADM

## 2024-07-20 RX ORDER — IPRATROPIUM BROMIDE AND ALBUTEROL SULFATE 2.5; .5 MG/3ML; MG/3ML
1 SOLUTION RESPIRATORY (INHALATION)
Status: DISCONTINUED | OUTPATIENT
Start: 2024-07-20 | End: 2024-07-20 | Stop reason: HOSPADM

## 2024-07-20 RX ORDER — TRANEXAMIC ACID 100 MG/ML
INJECTION, SOLUTION INTRAVENOUS
Status: COMPLETED
Start: 2024-07-20 | End: 2024-07-20

## 2024-07-20 RX ORDER — POTASSIUM CHLORIDE 7.45 MG/ML
10 INJECTION INTRAVENOUS
Status: CANCELLED | OUTPATIENT
Start: 2024-07-20 | End: 2024-07-20

## 2024-07-20 RX ORDER — MEPERIDINE HYDROCHLORIDE 25 MG/ML
12.5 INJECTION INTRAMUSCULAR; INTRAVENOUS; SUBCUTANEOUS EVERY 5 MIN PRN
Status: DISCONTINUED | OUTPATIENT
Start: 2024-07-20 | End: 2024-07-20 | Stop reason: HOSPADM

## 2024-07-20 RX ORDER — FENTANYL CITRATE 50 UG/ML
50 INJECTION, SOLUTION INTRAMUSCULAR; INTRAVENOUS EVERY 5 MIN PRN
Status: DISCONTINUED | OUTPATIENT
Start: 2024-07-20 | End: 2024-07-20 | Stop reason: HOSPADM

## 2024-07-20 RX ORDER — CELECOXIB 100 MG/1
100 CAPSULE ORAL 2 TIMES DAILY
Status: DISCONTINUED | OUTPATIENT
Start: 2024-07-20 | End: 2024-07-30 | Stop reason: HOSPADM

## 2024-07-20 RX ORDER — LIDOCAINE 4 G/G
1 PATCH TOPICAL AS NEEDED
Status: DISCONTINUED | OUTPATIENT
Start: 2024-07-20 | End: 2024-07-20 | Stop reason: HOSPADM

## 2024-07-20 RX ORDER — SODIUM CHLORIDE, SODIUM LACTATE, POTASSIUM CHLORIDE, CALCIUM CHLORIDE 600; 310; 30; 20 MG/100ML; MG/100ML; MG/100ML; MG/100ML
INJECTION, SOLUTION INTRAVENOUS CONTINUOUS PRN
Status: DISCONTINUED | OUTPATIENT
Start: 2024-07-20 | End: 2024-07-20 | Stop reason: SDUPTHER

## 2024-07-20 RX ORDER — POTASSIUM CHLORIDE 7.45 MG/ML
10 INJECTION INTRAVENOUS
Status: DISPENSED | OUTPATIENT
Start: 2024-07-20 | End: 2024-07-20

## 2024-07-20 RX ORDER — LORAZEPAM 2 MG/ML
0.5 INJECTION INTRAMUSCULAR
Status: DISCONTINUED | OUTPATIENT
Start: 2024-07-20 | End: 2024-07-20 | Stop reason: HOSPADM

## 2024-07-20 RX ORDER — DEXTROSE MONOHYDRATE 100 MG/ML
INJECTION, SOLUTION INTRAVENOUS CONTINUOUS PRN
Status: DISCONTINUED | OUTPATIENT
Start: 2024-07-20 | End: 2024-07-20 | Stop reason: HOSPADM

## 2024-07-20 RX ORDER — NALOXONE HYDROCHLORIDE 0.4 MG/ML
INJECTION, SOLUTION INTRAMUSCULAR; INTRAVENOUS; SUBCUTANEOUS PRN
Status: DISCONTINUED | OUTPATIENT
Start: 2024-07-20 | End: 2024-07-20 | Stop reason: HOSPADM

## 2024-07-20 RX ORDER — BUPIVACAINE HYDROCHLORIDE 5 MG/ML
INJECTION, SOLUTION EPIDURAL; INTRACAUDAL
Status: COMPLETED | OUTPATIENT
Start: 2024-07-20 | End: 2024-07-20

## 2024-07-20 RX ORDER — METOCLOPRAMIDE HYDROCHLORIDE 5 MG/ML
10 INJECTION INTRAMUSCULAR; INTRAVENOUS
Status: DISCONTINUED | OUTPATIENT
Start: 2024-07-20 | End: 2024-07-20 | Stop reason: HOSPADM

## 2024-07-20 RX ORDER — SODIUM CHLORIDE 9 MG/ML
INJECTION, SOLUTION INTRAVENOUS PRN
Status: DISCONTINUED | OUTPATIENT
Start: 2024-07-20 | End: 2024-07-20 | Stop reason: HOSPADM

## 2024-07-20 RX ORDER — CEFAZOLIN SODIUM 1 G/3ML
INJECTION, POWDER, FOR SOLUTION INTRAMUSCULAR; INTRAVENOUS PRN
Status: DISCONTINUED | OUTPATIENT
Start: 2024-07-20 | End: 2024-07-20 | Stop reason: SDUPTHER

## 2024-07-20 RX ORDER — HYDROMORPHONE HYDROCHLORIDE 1 MG/ML
0.5 INJECTION, SOLUTION INTRAMUSCULAR; INTRAVENOUS; SUBCUTANEOUS EVERY 5 MIN PRN
Status: DISCONTINUED | OUTPATIENT
Start: 2024-07-20 | End: 2024-07-20 | Stop reason: HOSPADM

## 2024-07-20 RX ORDER — SODIUM CHLORIDE 0.9 % (FLUSH) 0.9 %
5-40 SYRINGE (ML) INJECTION PRN
Status: DISCONTINUED | OUTPATIENT
Start: 2024-07-20 | End: 2024-07-20 | Stop reason: HOSPADM

## 2024-07-20 RX ORDER — DIPHENHYDRAMINE HYDROCHLORIDE 50 MG/ML
12.5 INJECTION INTRAMUSCULAR; INTRAVENOUS
Status: DISCONTINUED | OUTPATIENT
Start: 2024-07-20 | End: 2024-07-20 | Stop reason: HOSPADM

## 2024-07-20 RX ORDER — OXYCODONE HYDROCHLORIDE 5 MG/1
5 TABLET ORAL PRN
Status: DISCONTINUED | OUTPATIENT
Start: 2024-07-20 | End: 2024-07-20 | Stop reason: HOSPADM

## 2024-07-20 RX ORDER — LABETALOL HYDROCHLORIDE 5 MG/ML
10 INJECTION, SOLUTION INTRAVENOUS
Status: DISCONTINUED | OUTPATIENT
Start: 2024-07-20 | End: 2024-07-20 | Stop reason: HOSPADM

## 2024-07-20 RX ORDER — SODIUM CHLORIDE, SODIUM LACTATE, POTASSIUM CHLORIDE, CALCIUM CHLORIDE 600; 310; 30; 20 MG/100ML; MG/100ML; MG/100ML; MG/100ML
INJECTION, SOLUTION INTRAVENOUS ONCE
Status: DISCONTINUED | OUTPATIENT
Start: 2024-07-20 | End: 2024-07-20 | Stop reason: HOSPADM

## 2024-07-20 RX ORDER — VANCOMYCIN HYDROCHLORIDE 1 G/20ML
INJECTION, POWDER, LYOPHILIZED, FOR SOLUTION INTRAVENOUS PRN
Status: DISCONTINUED | OUTPATIENT
Start: 2024-07-20 | End: 2024-07-20 | Stop reason: ALTCHOICE

## 2024-07-20 RX ORDER — PROPOFOL 10 MG/ML
INJECTION, EMULSION INTRAVENOUS CONTINUOUS PRN
Status: DISCONTINUED | OUTPATIENT
Start: 2024-07-20 | End: 2024-07-20 | Stop reason: SDUPTHER

## 2024-07-20 RX ORDER — ONDANSETRON 2 MG/ML
4 INJECTION INTRAMUSCULAR; INTRAVENOUS
Status: DISCONTINUED | OUTPATIENT
Start: 2024-07-20 | End: 2024-07-20 | Stop reason: HOSPADM

## 2024-07-20 RX ORDER — OXYCODONE HYDROCHLORIDE 5 MG/1
10 TABLET ORAL PRN
Status: DISCONTINUED | OUTPATIENT
Start: 2024-07-20 | End: 2024-07-20 | Stop reason: HOSPADM

## 2024-07-20 RX ORDER — GLUCAGON 1 MG/ML
1 KIT INJECTION PRN
Status: DISCONTINUED | OUTPATIENT
Start: 2024-07-20 | End: 2024-07-20 | Stop reason: HOSPADM

## 2024-07-20 RX ORDER — SODIUM CHLORIDE 0.9 % (FLUSH) 0.9 %
5-40 SYRINGE (ML) INJECTION EVERY 12 HOURS SCHEDULED
Status: DISCONTINUED | OUTPATIENT
Start: 2024-07-20 | End: 2024-07-20 | Stop reason: HOSPADM

## 2024-07-20 RX ADMIN — CEFAZOLIN 2000 MG: 1 INJECTION, POWDER, FOR SOLUTION INTRAMUSCULAR; INTRAVENOUS at 23:22

## 2024-07-20 RX ADMIN — METOPROLOL TARTRATE 50 MG: 50 TABLET, FILM COATED ORAL at 21:09

## 2024-07-20 RX ADMIN — ATORVASTATIN CALCIUM 40 MG: 40 TABLET, FILM COATED ORAL at 14:52

## 2024-07-20 RX ADMIN — SODIUM CHLORIDE: 9 INJECTION, SOLUTION INTRAVENOUS at 14:51

## 2024-07-20 RX ADMIN — SODIUM CHLORIDE, POTASSIUM CHLORIDE, SODIUM LACTATE AND CALCIUM CHLORIDE: 600; 310; 30; 20 INJECTION, SOLUTION INTRAVENOUS at 09:57

## 2024-07-20 RX ADMIN — BUPIVACAINE HYDROCHLORIDE 12 MG: 5 INJECTION, SOLUTION EPIDURAL; INTRACAUDAL; PERINEURAL at 10:00

## 2024-07-20 RX ADMIN — HYDROMORPHONE HYDROCHLORIDE 0.5 MG: 0.5 INJECTION, SOLUTION INTRAMUSCULAR; INTRAVENOUS; SUBCUTANEOUS at 14:48

## 2024-07-20 RX ADMIN — CEFAZOLIN 2000 MG: 1 INJECTION, POWDER, FOR SOLUTION INTRAMUSCULAR; INTRAVENOUS at 17:21

## 2024-07-20 RX ADMIN — POTASSIUM CHLORIDE 10 MEQ: 7.45 INJECTION INTRAVENOUS at 18:12

## 2024-07-20 RX ADMIN — GABAPENTIN 100 MG: 100 CAPSULE ORAL at 14:53

## 2024-07-20 RX ADMIN — POTASSIUM CHLORIDE 10 MEQ: 7.45 INJECTION INTRAVENOUS at 14:55

## 2024-07-20 RX ADMIN — GABAPENTIN 100 MG: 100 CAPSULE ORAL at 21:09

## 2024-07-20 RX ADMIN — POTASSIUM CHLORIDE 100 ML: 7.45 INJECTION INTRAVENOUS at 10:22

## 2024-07-20 RX ADMIN — PROPOFOL 30 MCG/KG/MIN: 10 INJECTION, EMULSION INTRAVENOUS at 10:25

## 2024-07-20 RX ADMIN — CELECOXIB 100 MG: 100 CAPSULE ORAL at 21:09

## 2024-07-20 RX ADMIN — CELECOXIB 100 MG: 100 CAPSULE ORAL at 14:54

## 2024-07-20 RX ADMIN — ACETAMINOPHEN 1000 MG: 500 TABLET ORAL at 14:53

## 2024-07-20 RX ADMIN — CEFAZOLIN 2 G: 1 INJECTION, POWDER, FOR SOLUTION INTRAMUSCULAR; INTRAVENOUS at 09:57

## 2024-07-20 RX ADMIN — DULOXETINE HYDROCHLORIDE 20 MG: 20 CAPSULE, DELAYED RELEASE ORAL at 14:52

## 2024-07-20 RX ADMIN — TRANEXAMIC ACID 1000 MG: 1 INJECTION, SOLUTION INTRAVENOUS at 11:05

## 2024-07-20 RX ADMIN — LEVOTHYROXINE SODIUM 50 MCG: 0.03 TABLET ORAL at 06:12

## 2024-07-20 RX ADMIN — METOPROLOL TARTRATE 50 MG: 50 TABLET, FILM COATED ORAL at 09:52

## 2024-07-20 RX ADMIN — ACETAMINOPHEN 1000 MG: 500 TABLET ORAL at 21:08

## 2024-07-20 RX ADMIN — CALCIUM CARBONATE 500 MG: 500 TABLET, CHEWABLE ORAL at 21:09

## 2024-07-20 RX ADMIN — SODIUM CHLORIDE, PRESERVATIVE FREE 10 ML: 5 INJECTION INTRAVENOUS at 21:09

## 2024-07-20 RX ADMIN — TRANEXAMIC ACID 1000 MG: 1 INJECTION, SOLUTION INTRAVENOUS at 12:09

## 2024-07-20 ASSESSMENT — PAIN DESCRIPTION - DESCRIPTORS
DESCRIPTORS: ACHING

## 2024-07-20 ASSESSMENT — PAIN DESCRIPTION - ORIENTATION
ORIENTATION: LEFT

## 2024-07-20 ASSESSMENT — PAIN SCALES - WONG BAKER
WONGBAKER_NUMERICALRESPONSE: NO HURT

## 2024-07-20 ASSESSMENT — PAIN - FUNCTIONAL ASSESSMENT: PAIN_FUNCTIONAL_ASSESSMENT: ACTIVITIES ARE NOT PREVENTED

## 2024-07-20 ASSESSMENT — PAIN SCALES - GENERAL
PAINLEVEL_OUTOF10: 4
PAINLEVEL_OUTOF10: 3
PAINLEVEL_OUTOF10: 0
PAINLEVEL_OUTOF10: 0
PAINLEVEL_OUTOF10: 9
PAINLEVEL_OUTOF10: 0
PAINLEVEL_OUTOF10: 9
PAINLEVEL_OUTOF10: 9
PAINLEVEL_OUTOF10: 0
PAINLEVEL_OUTOF10: 0
PAINLEVEL_OUTOF10: 7
PAINLEVEL_OUTOF10: 2

## 2024-07-20 ASSESSMENT — PAIN DESCRIPTION - LOCATION
LOCATION: LEG
LOCATION: LEG;HIP

## 2024-07-20 NOTE — ANESTHESIA PROCEDURE NOTES
Spinal Block    End time: 7/20/2024 10:24 AM  Reason for block: primary anesthetic  Staffing  Performed: anesthesiologist   Anesthesiologist: Filipe Stiles MD  Resident/CRNA: Denys Pires APRN - CRNA  Performed by: Denys Pires APRN - CRNA  Authorized by: Filipe Stiles MD    Spinal Block  Patient position: left lateral decubitus  Prep: ChloraPrep  Patient monitoring: cardiac monitor, continuous pulse ox, continuous capnometry, frequent blood pressure checks and oxygen  Approach: midline  Location: L4/L5  Provider prep: mask  Local infiltration: lidocaine  Needle  Needle type: Mirna   Needle gauge: 22 G  Assessment  CSF: clear  Attempts: 3+  Hemodynamics: stable  Additional Notes  +csf, -heme, -parasthesia  Preanesthetic Checklist  Completed: patient identified, IV checked, site marked, risks and benefits discussed, surgical/procedural consents, equipment checked, pre-op evaluation, timeout performed, anesthesia consent given, oxygen available, monitors applied/VS acknowledged, fire risk safety assessment completed and verbalized and blood product R/B/A discussed and consented

## 2024-07-20 NOTE — PROGRESS NOTES
Hospitalist Progress Note               Daily Progress Note: 7/20/2024      Hospital Day: 3     Chief complaint:   Chief Complaint   Patient presents with    Leg Injury        Subjective:   Hospital course to date:  Jericho Gómez is a 89 y.o. female with pmh of HTN and PAF who presented to the hospital from free standing ED w/ an acute femur fracture.  The pt suffered a traumatic mechanical fall while walking up a set of concrete steps.  Post fall immediate pain was described and pt was unable to stand or ambulate.  Pt denied any head injury or LOC and was found to have a femur fxr via Xray.  Pt was hemodynamically stable on arrival and lab work reviewed.     --------  7/20-Patient is seen today for follow-up.  She is recently back from her surgery.  She is somewhat somnolent but easily awakens.  She does states that she feels cold otherwise she has no other specific complaints.  She is not short of breath        Medications reviewed  Current Facility-Administered Medications   Medication Dose Route Frequency    celecoxib (CELEBREX) capsule 100 mg  100 mg Oral BID    ceFAZolin (ANCEF) 2,000 mg in sterile water 20 mL IV syringe  2,000 mg IntraVENous Q8H    0.9 % sodium chloride infusion   IntraVENous Continuous    atorvastatin (LIPITOR) tablet 40 mg  40 mg Oral Daily    calcium carbonate (TUMS) chewable tablet 500 mg  500 mg Oral BID    DULoxetine (CYMBALTA) extended release capsule 20 mg  20 mg Oral Daily    fentaNYL (DURAGESIC) 50 MCG/HR 1 patch  1 patch TransDERmal Q72H    gabapentin (NEURONTIN) capsule 100 mg  100 mg Oral TID    levothyroxine (SYNTHROID) tablet 50 mcg  50 mcg Oral Daily    metoprolol tartrate (LOPRESSOR) tablet 50 mg  50 mg Oral BID    pantoprazole (PROTONIX) tablet 20 mg  20 mg Oral Daily    rOPINIRole (REQUIP XL) extended release tablet 12 mg  12 mg Oral Daily    sodium chloride flush 0.9 % injection 5-40 mL  5-40 mL IntraVENous 2 times per day    sodium chloride flush 0.9 %  injection 5-40 mL  5-40 mL IntraVENous PRN    0.9 % sodium chloride infusion   IntraVENous PRN    potassium chloride (KLOR-CON M) extended release tablet 40 mEq  40 mEq Oral PRN    Or    potassium bicarb-citric acid (EFFER-K) effervescent tablet 40 mEq  40 mEq Oral PRN    Or    potassium chloride 10 mEq/100 mL IVPB (Peripheral Line)  10 mEq IntraVENous PRN    magnesium sulfate 2000 mg in 50 mL IVPB premix  2,000 mg IntraVENous PRN    [Held by provider] enoxaparin (LOVENOX) injection 40 mg  40 mg SubCUTAneous Daily    ondansetron (ZOFRAN-ODT) disintegrating tablet 4 mg  4 mg Oral Q8H PRN    Or    ondansetron (ZOFRAN) injection 4 mg  4 mg IntraVENous Q6H PRN    polyethylene glycol (GLYCOLAX) packet 17 g  17 g Oral Daily    acetaminophen (TYLENOL) tablet 1,000 mg  1,000 mg Oral TID    HYDROmorphone (DILAUDID) injection 0.5 mg  0.5 mg IntraVENous Q4H PRN       Review of Systems:   Pertinent items are noted in HPI.    Objective:   Physical Exam:     BP (!) 144/58   Pulse 72   Temp 98.2 °F (36.8 °C) (Oral)   Resp 16   Ht 1.676 m (5' 6\")   Wt 59 kg (130 lb)   SpO2 98%   BMI 20.98 kg/m²  O2 Flow Rate (L/min): 2 L/min O2 Device: Nasal cannula    Temp (24hrs), Av.2 °F (36.8 °C), Min:97.6 °F (36.4 °C), Max:98.9 °F (37.2 °C)    701 - 1900  In: 1160 [P.O.:240; I.V.:920]  Out: 1000 [Urine:1000]   1901 -  0700  In: 1839.2 [P.O.:300; I.V.:1039.2]  Out: 1300 [Urine:1300]    Mode Rate TV Press PEEP FiO2 PIP Min. Vent                              General:  No acute distress mildly somnolent   Lungs:   Clear to auscultation bilaterally.   Chest wall:  No tenderness or deformity.   Heart:  Regular rate and rhythm, S1, S2 normal, no murmur, click, rub or gallop.   Abdomen:   Soft, non-tender. Bowel sounds normal. No masses,  No organomegaly.   Extremities: Extremities normal, atraumatic, no cyanosis or edema.   Pulses: 2+ and symmetric all extremities.   Skin: Skin color, texture, turgor normal. No  surgical repair stable  Hypokalemia-there is some improvement, per report of nurse she had only been given 20 mEq IV prior to surgery and this afternoon Strahl was 3.2  Essential hypertension-stable  Hypothyroidism  Mixed hyperlipidemia  Paroxysmal atrial fibrillation-she is currently in RSR.  Review of the record shows that she is not on anticoagulation I am not sure why  Aortic stenosis no issues        Plan:  Continue postop care as per orthopedics  Orthopedics has recommended toe-touch weightbearing for 6 weeks to allow early callus formation  Continue pain medicine as needed  Continue other home medications  Will give the other remaining 20 mEq of potassium and recheck in the morning          Code status: Full code    Social determinants of health: none      Estimated discharge date//time frame/disposition: Rehab, 1 to 2 days    Barriers to discharge: Clinical improvement, rehab acceptance/authorization          Paresh Samaniego MD

## 2024-07-20 NOTE — OP NOTE
Operative Note      Patient: Jericho Gómez  YOB: 1935  MRN: [id number]    Date of Procedure: 7/20/2024    Pre-Op Diagnosis Codes:     * Closed nondisplaced subtrochanteric fracture of left femur, initial encounter ([facility]) [S72.25XA]    Post-Op Diagnosis: Same as above, periprosthetic fracture       Procedure(s):  FEMUR INTRAMEDULLARY NAIL RETROGRADE, periprosthetic fracture    Surgeon(s):  Beto Martini MD    Assistant:   Surgical Assistant: Vane Chery    Anesthesia: General    Estimated Blood Loss (mL): 200    Complications: None    Specimens:   * No specimens in log *    Implants:  Implant Name Type Inv. Item Serial No.  Lot No. LRB No. Used Action   NAIL IM RETROGRADE 5 DEG 11X380 MM FEM BEND RFNADVANCED - [id number]  NAIL IM RETROGRADE 5 DEG 11X380 MM FEM BEND RFNADVANCED  [employer] [id number] Left 1 Implanted   SCREW BNE LCK 5X36 MM LP XL25 RETROGRADE STRL RFNADVANCED - [id number]  SCREW BNE LCK 5X36 MM LP XL25 RETROGRADE STRL RFNADVANCED  [employer] [id number] Left 1 Implanted   SCREW BNE LCK 5X34 MM LP XL25 RETROGRADE STRL RFNADVANCED - [id number]  SCREW BNE LCK 5X34 MM LP XL25 RETROGRADE STRL RFNADVANCED  [employer] [id number] Left 1 Implanted   SCREW BNE LCK 5X56 MM LP XL25 RETROGRADE STRL RFNADVANCED - [id number]  SCREW BNE LCK 5X56 MM LP XL25 RETROGRADE STRL RFNADVANCED  [employer] [id number] Left 1 Implanted   SCREW BNE LCK 5X76 MM LP TI STRL RFNADVANCED [id number] - [id number]  SCREW BNE LCK 5X76 MM LP TI STRL RFNADVANCED [id number]  [employer] [id number] Left 1 Implanted   SCREW BNE LCK 5X74 MM LP TI STRL RFNADVANCED [id number] - [id number]  SCREW BNE LCK 5X74 MM LP TI STRL RFNADVANCED [id number]  [employer] [id number] Left 1 Implanted         Drains:   [REMOVED] External Urinary Catheter (Removed)   Site Assessment Clean,dry & intact 07/20/24 0800   Placement Repositioned 07/20/24 0800   Securement Method Tape

## 2024-07-20 NOTE — PROGRESS NOTES
Discussed with the family that the Patient did not bring her dentures or her hearing aides with her to the hospital. Patient stated that she did not have them with her when she got to the hospital.

## 2024-07-20 NOTE — PROGRESS NOTES
Pt. Transferred to room 441 via bed in stable condition.  Bedside report given, SBAR reviewed, sites viewed.  Family in room.

## 2024-07-20 NOTE — PLAN OF CARE
Problem: Discharge Planning  Goal: Discharge to home or other facility with appropriate resources  Outcome: Progressing  Flowsheets (Taken 7/19/2024 2000)  Discharge to home or other facility with appropriate resources: Identify barriers to discharge with patient and caregiver     Problem: Pain  Goal: Verbalizes/displays adequate comfort level or baseline comfort level  Outcome: Progressing  Flowsheets (Taken 7/19/2024 1959)  Verbalizes/displays adequate comfort level or baseline comfort level: Encourage patient to monitor pain and request assistance     Problem: Skin/Tissue Integrity  Goal: Absence of new skin breakdown  Description: 1.  Monitor for areas of redness and/or skin breakdown  2.  Assess vascular access sites hourly  3.  Every 4-6 hours minimum:  Change oxygen saturation probe site  4.  Every 4-6 hours:  If on nasal continuous positive airway pressure, respiratory therapy assess nares and determine need for appliance change or resting period.  Outcome: Progressing     Problem: Safety - Adult  Goal: Free from fall injury  Outcome: Progressing     Problem: ABCDS Injury Assessment  Goal: Absence of physical injury  Outcome: Progressing      Routine Office Visit    Patient Name: Keli Gilbert    : 1973  MRN: [id number]    Subjective:  Keli is a 44 y.o. female who presents today for:    1. Sore throat  Patient presenting with 4 days of worsening sore throat.  She has seen white patches on the back of her throat. She has had some chills and sweats.  No documented fevers.  No n/v/d.  She has not had any other symptoms.  She is on immune suppression for psoriatic arthritis.     Past Medical History  Past Medical History:   Diagnosis Date    Abnormal Pap smear of vagina     AR (allergic rhinitis)     Demyelinating disorder     Depression     Fever blister     Fibromyalgia     followed by pain management    Generalized osteoarthritis of multiple sites     History of abnormal Pap smear     Hypertension     Hypothyroidism     Insulin resistance     Mixed anxiety and depressive disorder     Morbid obesity     Myelitis, optic neuritis in     treated with high-dose steroids and resolved; positive MRI and spinal fluid for a mass, but on embrel for psoriatic arthritis - she will see a MS specialist at [facility]; MRI normalized off embrel    Obesity     Pre-diabetes     hx diabetes on stereoids /    Psoriasis     Psoriatic arthritis     Vitamin D deficiency disease        Past Surgical History  Past Surgical History:   Procedure Laterality Date    SINUS SURGERY         Family History  Family History   Problem Relation Age of Onset    Psoriasis Father     Cancer Father      throat    Thyroid disease Mother     Heart disease Mother     Hypertension Mother     Hyperlipidemia Mother     Coronary artery disease Mother      s/p CABG    Heart attack Mother     Heart disease Sister      MVP    Diabetes Paternal Uncle     Diabetes Maternal Grandfather     Heart disease Maternal Grandfather     Thyroid disease Maternal Grandfather     ADD / ADHD Son     Melanoma Neg Hx     Lupus Neg Hx     Eczema Neg Hx     Acne Neg Hx      Breast cancer Neg Hx     Colon cancer Neg Hx     Ovarian cancer Neg Hx        Social History  Social History     Social History    Marital status: Single     Spouse name: N/A    Number of children: 1    Years of education: N/A     Occupational History    [employer]ing [employer]     Social History Main Topics    Smoking status: Never Smoker    Smokeless tobacco: Never Used    Alcohol use No    Drug use: No      Comment: 7/7/15 one weed brownie    Sexual activity: Yes     Partners: Male     Birth control/ protection: IUD     Other Topics Concern    Are You Pregnant Or Think You May Be? No    Breast-Feeding No     Social History Narrative    No narrative on file       Current Medications  Current Outpatient Prescriptions on File Prior to Visit   Medication Sig Dispense Refill    (Magic mouthwash) 1:1:1 Benadryl 12.5mg/5ml liq, aluminum & magnesium hydroxide-simehticone (Maalox), lidocaine viscous 2% Swish and spit 5 mLs every 4 (four) hours as needed. for mouth sores 300 mL 0    abatacept 125 mg/mL AtIn Inject 125 mg into the skin every 7 days. 12 Syringe 2    albuterol 90 mcg/actuation inhaler Inhale 2 puffs into the lungs every 6 (six) hours as needed for Wheezing. Rescue 18 g 0    azelastine (ASTELIN) 137 mcg (0.1 %) nasal spray 1 spray (137 mcg total) by Nasal route 2 (two) times daily. 30 mL 0    buPROPion (WELLBUTRIN) 100 MG tablet Take 1 tablet (100 mg total) by mouth 2 (two) times daily. 180 tablet 0    cholecalciferol, vitamin D3, 2,000 unit Tab Take 1 tablet by mouth Once a week.      cholecalciferol, vitamin D3, 50,000 unit capsule Take 1 capsule (50,000 Units total) by mouth every 14 (fourteen) days. 18 capsule 1    gabapentin (NEURONTIN) 300 MG capsule Take 300 mg by mouth once daily.      leflunomide (ARAVA) 20 MG Tab Take 1 tablet (20 mg total) by mouth once daily. 90 tablet 3    levocetirizine (XYZAL) 5 MG tablet TAKE 1 TABLET (5 MG TOTAL) BY MOUTH DAILY AS NEEDED FOR  ALLERGIES. 90 tablet 3    liothyronine (CYTOMEL) 5 MCG Tab TAKE 2 TABLETS (10 MCG TOTAL) BY MOUTH ONCE DAILY. 180 tablet 3    lisinopril-hydrochlorothiazide (PRINZIDE,ZESTORETIC) 20-25 mg Tab Take 1 tablet by mouth once daily. 90 tablet 3    methadone (DOLOPHINE) 10 MG tablet Take 1 tablet by mouth Three times a day.      mupirocin calcium 2% (BACTROBAN) 2 % cream APPLY TO AFFECTED AREA TWICE A DAY INTRANASALLY FOR 1ST WEEK OF THE MONTH 30 g 3    naproxen (NAPROSYN) 500 MG tablet Take 1 tablet (500 mg total) by mouth 3 (three) times daily with meals. 270 tablet 3    potassium chloride (MICRO-K) 10 MEQ CpSR Take 1 capsule (10 mEq total) by mouth once daily. 90 capsule 3    promethazine (PHENERGAN) 25 MG tablet Take 1 tablet by mouth Once daily as needed.      SYNTHROID 125 mcg tablet Take 1 tablet (125 mcg total) by mouth before breakfast. 90 tablet 3    tizanidine (ZANAFLEX) 4 MG tablet Take 4 mg by mouth 3 (three) times daily as needed.  6    trazodone (DESYREL) 50 MG tablet       triamcinolone (KENALOG) 0.1 % cream Apply topically Twice daily. Dirs: disp: 1# jar AAA      urea (CARMOL) 40 % Crea AAA foot qd after soak 198.6 g 3    clindamycin (CLEOCIN T) 1 % external solution       clobetasol (CLOBEX) 0.05 % topical spray Apply topically Twice daily. AAA bie. Disp 125 ml      [DISCONTINUED] omeprazole (PRILOSEC) 40 MG capsule Take 1 capsule (40 mg total) by mouth once daily. 90 capsule 3     No current facility-administered medications on file prior to visit.        Allergies   Review of patient's allergies indicates:   Allergen Reactions    Doxycycline hcl Nausea And Vomiting    Enbrel [etanercept] Other (See Comments)     Optic neurtits       Review of Systems (Pertinent positives)  Review of Systems   Constitutional: Positive for malaise/fatigue.   HENT: Positive for sore throat.    Eyes: Negative.    Respiratory: Negative.    Cardiovascular: Negative.    Gastrointestinal: Negative.   "  Musculoskeletal: Negative.    Skin: Negative.    Neurological: Negative.          /70 (BP Location: Right arm, Patient Position: Sitting, BP Method: Medium (Manual))   Pulse 93   Temp 98.4 °F (36.9 °C) (Oral)   Resp 12   Ht 5' 8" (1.727 m)   Wt 102 kg (224 lb 13.9 oz)   SpO2 96%   BMI 34.19 kg/m²     GENERAL APPEARANCE: in no apparent distress and well developed and well nourished  HEENT: PERRLA, EOMI, Sclera clear, anicteric, Oropharynx shows pustule on roof of mouth (soft palate), no lesions, Neck supple with midline trachea  NECK: normal, supple, no adenopathy, thyroid normal in size  RESPIRATORY: appears well, vitals normal, no respiratory distress, acyanotic, normal RR, chest clear, no wheezing, crepitations, rhonchi, normal symmetric air entry  HEART: regular rate and rhythm, S1, S2 normal, no murmur, click, rub or gallop.    ABDOMEN: abdomen is soft without tenderness, no masses, no hernias, no organomegaly, no rebound, no guarding. Suprapubic tenderness absent. No CVA tenderness.  SKIN: no rashes, no wounds, no other lesions  PSYCH: Alert, oriented x 3, thought content appropriate, speech normal, pleasant and cooperative, good eye contact, well groomed    Assessment/Plan:  Klei Gilbert is a 44 y.o. female who presents today for :    Keli was seen today for sore throat.    Diagnoses and all orders for this visit:    Aphthous ulcer of mouth  -     POCT RAPID STREP A  -     (Magic mouthwash) 1:1 Benadryl 12.5mg/5ml liq, mylanta, lidocaine viscous 2%, nystatin 100,000u, prednisolone 15mg/5mL, distilled water; Swish and swallow 10 mLs every 4 (four) hours as needed.      1.  Rapid strep negative  2.  Magic mouthwash to help with pain  3.  Follow up as needed    Andriy Moran MD    "

## 2024-07-20 NOTE — PLAN OF CARE
Problem: Discharge Planning  Goal: Discharge to home or other facility with appropriate resources  7/20/2024 1955 by Nicol Leavitt, RN  Outcome: Progressing  7/20/2024 1440 by Nicol Leavitt, RN  Outcome: Progressing     Problem: Pain  Goal: Verbalizes/displays adequate comfort level or baseline comfort level  7/20/2024 1955 by Nicol Leavitt, RN  Outcome: Progressing  7/20/2024 1440 by Nicol Leavitt, RN  Outcome: Progressing     Problem: Skin/Tissue Integrity  Goal: Absence of new skin breakdown  Description: 1.  Monitor for areas of redness and/or skin breakdown  2.  Assess vascular access sites hourly  3.  Every 4-6 hours minimum:  Change oxygen saturation probe site  4.  Every 4-6 hours:  If on nasal continuous positive airway pressure, respiratory therapy assess nares and determine need for appliance change or resting period.  7/20/2024 1955 by Nicol Leavitt, RN  Outcome: Progressing  7/20/2024 1440 by Nicol Leavitt, RN  Outcome: Progressing     Problem: Safety - Adult  Goal: Free from fall injury  7/20/2024 1955 by Nicol Leavitt, RN  Outcome: Progressing  7/20/2024 1440 by Nicol Leavitt, RN  Outcome: Progressing     Problem: ABCDS Injury Assessment  Goal: Absence of physical injury  7/20/2024 1955 by Nicol Leavitt, RN  Outcome: Progressing  7/20/2024 1440 by Nicol Leavitt, RN  Outcome: Progressing

## 2024-07-20 NOTE — ANESTHESIA PRE PROCEDURE
Department of Anesthesiology  Preprocedure Note       Name:  Jericho Gómez   Age:  89 y.o.  :  1935                                          MRN:  [id number]         Date:  2024      Surgeon: Surgeon(s):  Beto Martini MD    Procedure: Procedure(s):  FEMUR INTRAMEDULLARY NAIL RETROGRADE    Medications prior to admission:   Prior to Admission medications    Medication Sig Start Date End Date Taking? Authorizing Provider   potassium chloride (KLOR-CON) 10 MEQ extended release tablet  24   Zoe Kendall MD   DULoxetine (CYMBALTA) 20 MG extended release capsule Take 1 capsule by mouth daily    Zoe Kendall MD   fentaNYL (DURAGESIC) 50 MCG/HR Place 1 patch onto the skin every 72 hours.    Zoe Kendall MD   gabapentin (NEURONTIN) 100 MG capsule Take 1 capsule by mouth 3 times daily.    Zoe Kendall MD   atorvastatin (LIPITOR) 40 MG tablet Take 1 tablet by mouth daily    Zoe Kendall MD   levothyroxine (SYNTHROID) 50 MCG tablet Take 1 tablet by mouth Daily    Zoe Kendall MD   hydroCHLOROthiazide (MICROZIDE) 12.5 MG capsule Take 1 capsule by mouth every morning    Zoe Kendall MD   meloxicam (MOBIC) 15 MG tablet Take 1 tablet by mouth daily    Zoe Kendall MD   metoprolol tartrate (LOPRESSOR) 50 MG tablet Take 1 tablet by mouth 2 times daily    Zoe Kendall MD   pantoprazole (PROTONIX) 20 MG tablet Take 1 tablet by mouth daily    Zoe Kendall MD   rOPINIRole (REQUIP XL) 12 MG TB24 extended release tablet Take 1 tablet by mouth daily    Zoe Kendall MD   furosemide (LASIX) 20 MG tablet Take 0.5 tablets by mouth daily    Zoe Kendall MD   vitamin B-12 (CYANOCOBALAMIN) 100 MCG tablet Take 0.5 tablets by mouth daily    Zoe Kendall MD   calcium carbonate (OSCAL) 500 MG TABS tablet Take 1 tablet by mouth daily    Zoe Kendall MD       Current medications:    Current  02:27 PM    LABGLOM 50 10/30/2023 05:07 PM    GLUCOSE 127 07/19/2024 02:27 PM    CALCIUM 9.0 07/19/2024 02:27 PM    BILITOT 0.8 07/19/2024 06:35 AM    ALKPHOS 72 07/19/2024 06:35 AM    AST 25 07/19/2024 06:35 AM    ALT 19 07/19/2024 06:35 AM       POC Tests: No results for input(s): \"POCGLU\", \"POCNA\", \"POCK\", \"POCCL\", \"POCBUN\", \"POCHEMO\", \"POCHCT\" in the last 72 hours.    Coags:   Lab Results   Component Value Date/Time    PROTIME 14.1 07/18/2024 09:18 PM    INR 1.0 07/18/2024 09:18 PM    APTT 35.5 07/19/2024 02:27 PM       HCG (If Applicable): No results found for: \"PREGTESTUR\", \"PREGSERUM\", \"HCG\", \"HCGQUANT\"     ABGs: No results found for: \"PHART\", \"PO2ART\", \"PRD2XFK\", \"HDF4PDE\", \"BEART\", \"R6QBNBLW\"     Type & Screen (If Applicable):  No results found for: \"LABABO\"    Drug/Infectious Status (If Applicable):  No results found for: \"HIV\", \"HEPCAB\"    COVID-19 Screening (If Applicable): No results found for: \"COVID19\"        Anesthesia Evaluation  Patient summary reviewed and Nursing notes reviewed  Airway: Mallampati: II  TM distance: >3 FB   Neck ROM: full  Mouth opening: > = 3 FB   Dental: normal exam         Pulmonary:Negative Pulmonary ROS and normal exam  breath sounds clear to auscultation                             Cardiovascular:  Exercise tolerance: good (>4 METS)  (+) hypertension: no interval change, valvular problems/murmurs: AS, dysrhythmias: atrial fibrillation, hyperlipidemia        Rhythm: regular  Rate: normal                 ROS comment: Echo:  ·  Left Ventricle: Hyperdynamic left ventricular systolic function with a visually estimated EF of greater than 80%. Left ventricle size is normal. Posterior thickening. Septal thickening. Normal wall motion.  ·  Aortic Valve: Possible Bicuspid valve with complete commisural fusion of the right and noncoronary cusps. Severely calcified cusps. Mild to moderate stenosis of the aortic valve.  ·  Tricuspid Valve: The estimated RVSP is 30 mmHg.  ·  Image quality  is fair. Technically difficult study and procedure performed with the patient in a supine position.     Neuro/Psych:   (+) psychiatric history:            GI/Hepatic/Renal: Neg GI/Hepatic/Renal ROS            Endo/Other:    (+) hypothyroidism::..                 Abdominal:              PE comment: Deferred.   Vascular: negative vascular ROS.         Other Findings:             Anesthesia Plan      spinal     ASA 3     (Standard ASA monitors: continuous EKG, BP, HR, pulse oximeter, and capnography.  )      MIPS: Postoperative opioids intended and Prophylactic antiemetics administered.  Anesthetic plan and risks discussed with patient (and family, if present.).              Post-op pain plan if not by surgeon: intrathecal narcotics            Filipe Stiles MD   7/20/2024

## 2024-07-20 NOTE — PROGRESS NOTES
Orthopedic update: Postoperative radiographs were reviewed and there is slight displacement of the oblique fracture pattern.  Updated weightbearing status is going to be toe-touch weightbearing for 6 weeks to allow early callus formation.  Range of motion as tolerated.  Only precaution is toe-touch weightbearing for 6 weeks.

## 2024-07-21 LAB
ANION GAP SERPL CALC-SCNC: 4 MMOL/L (ref 5–15)
ANION GAP SERPL CALC-SCNC: 8 MMOL/L (ref 5–15)
BUN SERPL-MCNC: 18 MG/DL (ref 6–20)
BUN SERPL-MCNC: 22 MG/DL (ref 6–20)
BUN/CREAT SERPL: 27 (ref 12–20)
BUN/CREAT SERPL: 28 (ref 12–20)
CA-I BLD-MCNC: 7.6 MG/DL (ref 8.5–10.1)
CA-I BLD-MCNC: 7.8 MG/DL (ref 8.5–10.1)
CHLORIDE SERPL-SCNC: 106 MMOL/L (ref 97–108)
CHLORIDE SERPL-SCNC: 110 MMOL/L (ref 97–108)
CO2 SERPL-SCNC: 24 MMOL/L (ref 21–32)
CO2 SERPL-SCNC: 27 MMOL/L (ref 21–32)
CREAT SERPL-MCNC: 0.66 MG/DL (ref 0.55–1.02)
CREAT SERPL-MCNC: 0.79 MG/DL (ref 0.55–1.02)
ERYTHROCYTE [DISTWIDTH] IN BLOOD BY AUTOMATED COUNT: 15.1 % (ref 11.5–14.5)
GLUCOSE SERPL-MCNC: 123 MG/DL (ref 65–100)
GLUCOSE SERPL-MCNC: 144 MG/DL (ref 65–100)
HCT VFR BLD AUTO: 18.8 % (ref 35–47)
HGB BLD-MCNC: 6.1 G/DL (ref 11.5–16)
MAGNESIUM SERPL-MCNC: 1.6 MG/DL (ref 1.6–2.4)
MCH RBC QN AUTO: 28.2 PG (ref 26–34)
MCHC RBC AUTO-ENTMCNC: 32.4 G/DL (ref 30–36.5)
MCV RBC AUTO: 87 FL (ref 80–99)
NRBC # BLD: 0 K/UL (ref 0–0.01)
NRBC BLD-RTO: 0 PER 100 WBC
PLATELET # BLD AUTO: 153 K/UL (ref 150–400)
PMV BLD AUTO: 10.2 FL (ref 8.9–12.9)
POTASSIUM SERPL-SCNC: 2.9 MMOL/L (ref 3.5–5.1)
POTASSIUM SERPL-SCNC: 3.9 MMOL/L (ref 3.5–5.1)
RBC # BLD AUTO: 2.16 M/UL (ref 3.8–5.2)
SODIUM SERPL-SCNC: 137 MMOL/L (ref 136–145)
SODIUM SERPL-SCNC: 142 MMOL/L (ref 136–145)
WBC # BLD AUTO: 11.2 K/UL (ref 3.6–11)

## 2024-07-21 PROCEDURE — 83735 ASSAY OF MAGNESIUM: CPT

## 2024-07-21 PROCEDURE — 2580000003 HC RX 258: Performed by: FAMILY MEDICINE

## 2024-07-21 PROCEDURE — 36415 COLL VENOUS BLD VENIPUNCTURE: CPT

## 2024-07-21 PROCEDURE — 2580000003 HC RX 258: Performed by: ORTHOPAEDIC SURGERY

## 2024-07-21 PROCEDURE — 85027 COMPLETE CBC AUTOMATED: CPT

## 2024-07-21 PROCEDURE — 6370000000 HC RX 637 (ALT 250 FOR IP): Performed by: ORTHOPAEDIC SURGERY

## 2024-07-21 PROCEDURE — 6370000000 HC RX 637 (ALT 250 FOR IP): Performed by: HOSPITALIST

## 2024-07-21 PROCEDURE — 1100000000 HC RM PRIVATE

## 2024-07-21 PROCEDURE — P9016 RBC LEUKOCYTES REDUCED: HCPCS

## 2024-07-21 PROCEDURE — 36430 TRANSFUSION BLD/BLD COMPNT: CPT

## 2024-07-21 PROCEDURE — 2580000003 HC RX 258: Performed by: HOSPITALIST

## 2024-07-21 PROCEDURE — 30233N1 TRANSFUSION OF NONAUTOLOGOUS RED BLOOD CELLS INTO PERIPHERAL VEIN, PERCUTANEOUS APPROACH: ICD-10-PCS | Performed by: INTERNAL MEDICINE

## 2024-07-21 PROCEDURE — 6360000002 HC RX W HCPCS: Performed by: FAMILY MEDICINE

## 2024-07-21 PROCEDURE — 2700000000 HC OXYGEN THERAPY PER DAY

## 2024-07-21 PROCEDURE — 6360000002 HC RX W HCPCS: Performed by: ORTHOPAEDIC SURGERY

## 2024-07-21 PROCEDURE — 6360000002 HC RX W HCPCS: Performed by: HOSPITALIST

## 2024-07-21 PROCEDURE — 80048 BASIC METABOLIC PNL TOTAL CA: CPT

## 2024-07-21 PROCEDURE — 94761 N-INVAS EAR/PLS OXIMETRY MLT: CPT

## 2024-07-21 RX ORDER — SODIUM CHLORIDE 9 MG/ML
INJECTION, SOLUTION INTRAVENOUS PRN
Status: DISCONTINUED | OUTPATIENT
Start: 2024-07-21 | End: 2024-07-30 | Stop reason: HOSPADM

## 2024-07-21 RX ORDER — POTASSIUM CHLORIDE 7.45 MG/ML
10 INJECTION INTRAVENOUS
Status: COMPLETED | OUTPATIENT
Start: 2024-07-21 | End: 2024-07-21

## 2024-07-21 RX ORDER — 0.9 % SODIUM CHLORIDE 0.9 %
500 INTRAVENOUS SOLUTION INTRAVENOUS ONCE
Status: COMPLETED | OUTPATIENT
Start: 2024-07-21 | End: 2024-07-21

## 2024-07-21 RX ADMIN — SODIUM CHLORIDE, PRESERVATIVE FREE 10 ML: 5 INJECTION INTRAVENOUS at 20:54

## 2024-07-21 RX ADMIN — CELECOXIB 100 MG: 100 CAPSULE ORAL at 08:45

## 2024-07-21 RX ADMIN — ACETAMINOPHEN 1000 MG: 500 TABLET ORAL at 20:54

## 2024-07-21 RX ADMIN — GABAPENTIN 100 MG: 100 CAPSULE ORAL at 08:45

## 2024-07-21 RX ADMIN — SODIUM CHLORIDE 500 ML: 9 INJECTION, SOLUTION INTRAVENOUS at 11:12

## 2024-07-21 RX ADMIN — PANTOPRAZOLE SODIUM 20 MG: 20 TABLET, DELAYED RELEASE ORAL at 08:45

## 2024-07-21 RX ADMIN — ACETAMINOPHEN 1000 MG: 500 TABLET ORAL at 15:06

## 2024-07-21 RX ADMIN — METOPROLOL TARTRATE 50 MG: 50 TABLET, FILM COATED ORAL at 20:54

## 2024-07-21 RX ADMIN — CALCIUM CARBONATE 500 MG: 500 TABLET, CHEWABLE ORAL at 08:44

## 2024-07-21 RX ADMIN — CEFAZOLIN 2000 MG: 1 INJECTION, POWDER, FOR SOLUTION INTRAMUSCULAR; INTRAVENOUS at 08:00

## 2024-07-21 RX ADMIN — CALCIUM CARBONATE 500 MG: 500 TABLET, CHEWABLE ORAL at 20:54

## 2024-07-21 RX ADMIN — POTASSIUM CHLORIDE 10 MEQ: 7.46 INJECTION, SOLUTION INTRAVENOUS at 12:50

## 2024-07-21 RX ADMIN — GABAPENTIN 100 MG: 100 CAPSULE ORAL at 20:53

## 2024-07-21 RX ADMIN — POTASSIUM CHLORIDE 10 MEQ: 7.46 INJECTION, SOLUTION INTRAVENOUS at 11:48

## 2024-07-21 RX ADMIN — SODIUM CHLORIDE: 9 INJECTION, SOLUTION INTRAVENOUS at 13:55

## 2024-07-21 RX ADMIN — POLYETHYLENE GLYCOL 3350 17 G: 17 POWDER, FOR SOLUTION ORAL at 08:44

## 2024-07-21 RX ADMIN — HYDROMORPHONE HYDROCHLORIDE 0.5 MG: 0.5 INJECTION, SOLUTION INTRAMUSCULAR; INTRAVENOUS; SUBCUTANEOUS at 03:24

## 2024-07-21 RX ADMIN — ATORVASTATIN CALCIUM 40 MG: 40 TABLET, FILM COATED ORAL at 08:45

## 2024-07-21 RX ADMIN — POTASSIUM CHLORIDE 10 MEQ: 7.46 INJECTION, SOLUTION INTRAVENOUS at 10:06

## 2024-07-21 RX ADMIN — SODIUM CHLORIDE, PRESERVATIVE FREE 10 ML: 5 INJECTION INTRAVENOUS at 08:46

## 2024-07-21 RX ADMIN — GABAPENTIN 100 MG: 100 CAPSULE ORAL at 14:00

## 2024-07-21 RX ADMIN — ACETAMINOPHEN 1000 MG: 500 TABLET ORAL at 08:44

## 2024-07-21 RX ADMIN — LEVOTHYROXINE SODIUM 50 MCG: 0.03 TABLET ORAL at 05:53

## 2024-07-21 RX ADMIN — DULOXETINE HYDROCHLORIDE 20 MG: 20 CAPSULE, DELAYED RELEASE ORAL at 08:45

## 2024-07-21 RX ADMIN — POTASSIUM CHLORIDE 10 MEQ: 7.46 INJECTION, SOLUTION INTRAVENOUS at 08:33

## 2024-07-21 RX ADMIN — METOPROLOL TARTRATE 50 MG: 50 TABLET, FILM COATED ORAL at 08:45

## 2024-07-21 RX ADMIN — ROPINIROLE 12 MG: 2 TABLET, FILM COATED, EXTENDED RELEASE ORAL at 09:00

## 2024-07-21 RX ADMIN — CELECOXIB 100 MG: 100 CAPSULE ORAL at 20:53

## 2024-07-21 ASSESSMENT — PAIN SCALES - GENERAL
PAINLEVEL_OUTOF10: 0
PAINLEVEL_OUTOF10: 3
PAINLEVEL_OUTOF10: 0
PAINLEVEL_OUTOF10: 7
PAINLEVEL_OUTOF10: 0
PAINLEVEL_OUTOF10: 6
PAINLEVEL_OUTOF10: 8
PAINLEVEL_OUTOF10: 0

## 2024-07-21 ASSESSMENT — PAIN DESCRIPTION - ORIENTATION
ORIENTATION: LEFT

## 2024-07-21 ASSESSMENT — PAIN DESCRIPTION - LOCATION
LOCATION: FOOT;LEG
LOCATION: HIP
LOCATION: HIP;LEG
LOCATION: HIP

## 2024-07-21 ASSESSMENT — PAIN SCALES - WONG BAKER
WONGBAKER_NUMERICALRESPONSE: NO HURT
WONGBAKER_NUMERICALRESPONSE: NO HURT

## 2024-07-21 ASSESSMENT — PAIN DESCRIPTION - DESCRIPTORS
DESCRIPTORS: ACHING

## 2024-07-21 ASSESSMENT — PAIN - FUNCTIONAL ASSESSMENT
PAIN_FUNCTIONAL_ASSESSMENT: ACTIVITIES ARE NOT PREVENTED
PAIN_FUNCTIONAL_ASSESSMENT: ACTIVITIES ARE NOT PREVENTED

## 2024-07-21 NOTE — PROGRESS NOTES
Orders received for therapy,Patient's Hgb is 6.1 today and not appropriate for therapy.Getting blood transfusion.We will work with patient tomorrow.

## 2024-07-21 NOTE — CONSENT
Informed Consent for Blood Component Transfusion Note    I have discussed with the patient the rationale for blood component transfusion; its benefits in treating or preventing fatigue, organ damage, or death; and its risk which includes mild transfusion reactions, rare risk of blood borne infection, or more serious but rare reactions. I have discussed the alternatives to transfusion, including the risk and consequences of not receiving transfusion. The patient had an opportunity to ask questions and had agreed to proceed with transfusion of blood components.    Electronically signed by Paresh Samaniego MD on 7/21/24 at 8:29 AM EDT

## 2024-07-21 NOTE — PROGRESS NOTES
Spiritual Care Assessment/Progress Note  [facility]    Name: Jericho Gómez MRN: [id number]    Age: 89 y.o.     Sex: female   Language: English     Date: 7/21/2024            Total Time Calculated: 6 min              Spiritual Assessment begun in [facility]  Service Provided For: Patient not available  Referral/Consult From: Rounding  Encounter Overview/Reason: Attempted Encounter    Spiritual beliefs:      [] Involved in a emmy tradition/spiritual practice:      [] Supported by a emmy community:      [] Claims no spiritual orientation:      [] Seeking spiritual identity:           [] Adheres to an individual form of spirituality:      [x] Not able to assess:                Identified resources for coping and support system:   Support System: Unknown       [] Prayer                  [] Devotional reading               [] Music                  [] Guided Imagery     [] Pet visits                                        [] Other: (COMMENT)     Specific area/focus of visit   Encounter:    Crisis:    Spiritual/Emotional needs:    Ritual, Rites and Sacraments:    Grief, Loss, and Adjustments:    Ethics/Mediation:    Behavioral Health:    Palliative Care:    Advance Care Planning:      Plan/Referrals: Other (Comment) ( is available if needed)    Narrative:  attempted to visit pt for spiritual assessment while making rounds on 4S. Reviewed chart. Pt is asleep.  provided sustaining presence and left a pastoral msg for the pt notifying pt of attempt.    Please contact Spiritual Health for any emotional/spiritual needs and/or further referrals. Thank you.      Rev. Capri Birmingham MDIV   can be reached by calling the  at [facility]  [phone]

## 2024-07-21 NOTE — PROGRESS NOTES
ORTHOPEDIC CONSULT    Patient: Jericho Gómez MRN: [id number]  SSN: [id number]    YOB: 1935  Age: 89 y.o.  Sex: female      Subjective:      Jericho Gómez is a 89 y.o. female who is being seen for left distal femur fracture following a fall. Patient seen at bedside with family present. They report the patient went out to the [facility] yesterday and fell while going up the stairs injuring her left leg. The family states she has had several falls in the past, two falls which resulted in wrist fractures one of which underwent surgical fixation by Dr. Ramirez in [place], [place]. The patient is from [place] and receives most of her care there. However, following the fall yesterday the family wanted the patient to go to [facility], in hopes that Dr. Ramirez could do surgery for her mother as he fixed her wrist during a previous fall. While at [facility] ED X-rays were obtained and revealed a distal femoral fracture. It was recommended by Dr. Ramirez the patient be sent to [facility] for a higher level of care. Patient's family wanted the patient to be transported to [place], [place] but unfortunately were unable to. Orthopedics here consulted for further evaluation and management.     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 Family states patient lives alone and is independent with her ADLs. She rarely uses a walker but has one at home.    Denies N/V, fever, chills, chest pain, abdominal pain, numbness/tingling, or pain elsewhere other than the left leg.     7/21/2024: Postop day 1 from left retrograde intramedullary nail for a distal femur periprosthetic fracture.  Patient had a hemoglobin of 6.1 this morning and underwent transfusion with postop transfusion CBC pending.  Patient's son and daughter-in-law are present at bedside.  The patient is to be toe-touch weightbearing for 6 weeks given the spiral fracture has  mouth Daily    Zoe Kendall MD   hydroCHLOROthiazide (MICROZIDE) 12.5 MG capsule Take 1 capsule by mouth every morning    Zoe Kendall MD   meloxicam (MOBIC) 15 MG tablet Take 1 tablet by mouth daily    Zoe Kendall MD   metoprolol tartrate (LOPRESSOR) 50 MG tablet Take 1 tablet by mouth 2 times daily    Zoe Kendall MD   pantoprazole (PROTONIX) 20 MG tablet Take 1 tablet by mouth daily    Zoe Kendall MD   rOPINIRole (REQUIP XL) 12 MG TB24 extended release tablet Take 1 tablet by mouth daily    Zoe Kendall MD   furosemide (LASIX) 20 MG tablet Take 0.5 tablets by mouth daily    Zoe Kendall MD   vitamin B-12 (CYANOCOBALAMIN) 100 MCG tablet Take 0.5 tablets by mouth daily    Zoe Kendall MD   calcium carbonate (OSCAL) 500 MG TABS tablet Take 1 tablet by mouth daily    Zoe Kendall MD       No Known Allergies    Review of Systems:  Review of Systems   Constitutional:  Negative for chills and fever.   HENT:  Negative for sore throat.    Respiratory:  Positive for shortness of breath (began prior to fall).    Cardiovascular:  Negative for chest pain.   Gastrointestinal:  Negative for abdominal pain, nausea and vomiting.   Musculoskeletal:  Positive for arthralgias (left leg), gait problem, joint swelling (left leg) and myalgias (left leg).   Skin:  Negative for wound.   Neurological:  Negative for numbness.   Psychiatric/Behavioral:  Negative for agitation.            Objective:     Current Facility-Administered Medications   Medication Dose Route Frequency    0.9 % sodium chloride infusion   IntraVENous PRN    celecoxib (CELEBREX) capsule 100 mg  100 mg Oral BID    0.9 % sodium chloride infusion   IntraVENous Continuous    atorvastatin (LIPITOR) tablet 40 mg  40 mg Oral Daily    calcium carbonate (TUMS) chewable tablet 500 mg  500 mg Oral BID    DULoxetine (CYMBALTA) extended release capsule 20 mg  20 mg Oral Daily    fentaNYL (DURAGESIC)

## 2024-07-21 NOTE — PROGRESS NOTES
Hospitalist Progress Note               Daily Progress Note: 7/21/2024      Hospital Day: 4     Chief complaint:   Chief Complaint   Patient presents with    Leg Injury        Subjective:   Hospital course to date:  Jericho Gómez is a 89 y.o. female with pmh of HTN and PAF who presented to the hospital from free standing ED w/ an acute femur fracture.  The pt suffered a traumatic mechanical fall while walking up a set of concrete steps.  Post fall immediate pain was described and pt was unable to stand or ambulate.  Pt denied any head injury or LOC and was found to have a femur fxr via Xray.  Pt was hemodynamically stable on arrival and lab work reviewed.      --------  7/20-Patient is seen today for follow-up.  She is recently back from her surgery.  She is somewhat somnolent but easily awakens.  She does states that she feels cold otherwise she has no other specific complaints.  She is not short of breath    --------  7/21-Patient is seen today for follow-up.  No reported issues from overnight.  Patient is seen in her room she is awake and alert lying in bed.  She feels very tired and is having some pain in her leg.  She denies any shortness of breath or chest pain.  When asked the patient states she has not been using her incentive spirometer lately        Medications reviewed  Current Facility-Administered Medications   Medication Dose Route Frequency    potassium chloride 10 mEq/100 mL IVPB (Peripheral Line)  10 mEq IntraVENous Q1H    0.9 % sodium chloride infusion   IntraVENous PRN    celecoxib (CELEBREX) capsule 100 mg  100 mg Oral BID    ceFAZolin (ANCEF) 2,000 mg in sterile water 20 mL IV syringe  2,000 mg IntraVENous Q8H    0.9 % sodium chloride infusion   IntraVENous Continuous    atorvastatin (LIPITOR) tablet 40 mg  40 mg Oral Daily    calcium carbonate (TUMS) chewable tablet 500 mg  500 mg Oral BID    DULoxetine (CYMBALTA) extended release capsule 20 mg  20 mg Oral Daily    fentaNYL  alert, pale appearing no acute distress   Lungs:   Clear to auscultation bilaterally.   Chest wall:  No tenderness or deformity.   Heart:  Regular rate and rhythm, S1, S2 normal, no murmur, click, rub or gallop.   Abdomen:   Soft, non-tender. Bowel sounds normal. No masses,  No organomegaly.   Extremities: Extremities normal, atraumatic, no cyanosis or edema.   Pulses: 2+ and symmetric all extremities.   Skin: Skin color, texture, turgor normal. No rashes or lesions   Neurologic: CNII-XII intact.  No gross focal deficits         Data Review:       Recent Days:  Recent Labs     07/18/24 2118 07/19/24  0635 07/21/24  0652   WBC 10.9 9.3 11.2*   HGB 11.2* 9.2* 6.1*   HCT 33.5* 27.1* 18.8*    205 153     Recent Labs     07/18/24 2118 07/19/24  0635 07/19/24  1427 07/20/24  0605 07/20/24  1505 07/21/24  0652    139 140  --  139 142   K 2.9* 2.7* 2.8* 2.8* 3.2* 2.9*   CL 95* 99 101  --  104 110*   CO2 35* 34* 31  --  31 24   GLUCOSE 151* 148* 127*  --  150* 123*   BUN 21* 19 18  --  15 18   CREATININE 0.86 0.78 0.70  --  0.72 0.66   CALCIUM 9.2 9.2 9.0  --  8.0* 7.6*   PHOS  --  2.7  --   --   --   --    BILITOT 0.7 0.8  --   --   --   --    AST 23 25  --   --   --   --    ALT 19 19  --   --   --   --    INR 1.0  --   --   --   --   --      No results for input(s): \"PHART\", \"BIY1IFO\", \"PO2ART\", \"WGT8GVT\", \"BEART\", \"PPN3NAFF\", \"HGBART\", \"RP2OSSBHE\", \"FIO2A\", \"W2ABGVOF\", \"OXYHEM\", \"CARBOXHGBART\", \"METHGBART\", \"I8UZJNRRC\", \"PHCORART\", \"TEMP\" in the last 72 hours.    Invalid input(s): \"NNH0XZIHX\"    24 Hour Results:  Recent Results (from the past 24 hour(s))   Basic Metabolic Panel    Collection Time: 07/20/24  3:05 PM   Result Value Ref Range    Sodium 139 136 - 145 mmol/L    Potassium 3.2 (L) 3.5 - 5.1 mmol/L    Chloride 104 97 - 108 mmol/L    CO2 31 21 - 32 mmol/L    Anion Gap 4 (L) 5 - 15 mmol/L    Glucose 150 (H) 65 - 100 mg/dL    BUN 15 6 - 20 mg/dL    Creatinine 0.72 0.55 - 1.02 mg/dL    BUN/Creatinine  aggressively replace potassium with IV potassium supplementation this morning and recheck potassium this afternoon and replace further as needed  Will transfuse 1 unit packed red blood cells now to get hemoglobin above 7 and will likely give IV iron starting tomorrow  Continue pain medicine as needed  Will also check magnesium as an add-on from this morning's lab work  Discussed care plan with nurse    I also discussed the risks and benefits of transfusion and explained the important need for and answered patient's questions.  She is amenable to the transfusion and will give informed consent        Code status: Full code    Social determinants of health: none      Estimated discharge date//time frame/disposition: 2 days likely rehab    Barriers to discharge: Clinical improvement          Paresh Samaniego MD

## 2024-07-21 NOTE — PLAN OF CARE
Problem: Discharge Planning  Goal: Discharge to home or other facility with appropriate resources  7/20/2024 2214 by Jazmyn Kidd RN  Outcome: Progressing  Flowsheets (Taken 7/20/2024 2040)  Discharge to home or other facility with appropriate resources: Identify barriers to discharge with patient and caregiver  7/20/2024 1955 by Nicol Leavitt, RN  Outcome: Progressing  7/20/2024 1440 by Nicol Leavitt, RN  Outcome: Progressing     Problem: Pain  Goal: Verbalizes/displays adequate comfort level or baseline comfort level  7/20/2024 2214 by Jazmyn Kidd RN  Outcome: Progressing  7/20/2024 1955 by Nicol Leavitt, RN  Outcome: Progressing  7/20/2024 1440 by Nicol Leavitt, RN  Outcome: Progressing     Problem: Skin/Tissue Integrity  Goal: Absence of new skin breakdown  Description: 1.  Monitor for areas of redness and/or skin breakdown  2.  Assess vascular access sites hourly  3.  Every 4-6 hours minimum:  Change oxygen saturation probe site  4.  Every 4-6 hours:  If on nasal continuous positive airway pressure, respiratory therapy assess nares and determine need for appliance change or resting period.  7/20/2024 2214 by Jazmyn Kidd RN  Outcome: Progressing  7/20/2024 1955 by Nicol Leavitt, RN  Outcome: Progressing  7/20/2024 1440 by Nicol Leavitt, RN  Outcome: Progressing     Problem: Safety - Adult  Goal: Free from fall injury  7/20/2024 2214 by Jazmyn Kidd RN  Outcome: Progressing  7/20/2024 1955 by Nicol Leavitt, RN  Outcome: Progressing  7/20/2024 1440 by Nicol Leavitt, RN  Outcome: Progressing     Problem: ABCDS Injury Assessment  Goal: Absence of physical injury  7/20/2024 2214 by Jazmyn Kidd RN  Outcome: Progressing  7/20/2024 1955 by Nicol Leavitt, RN  Outcome: Progressing  7/20/2024 1440 by Nicol Leavitt, RN  Outcome: Progressing

## 2024-07-22 LAB
ABO + RH BLD: NORMAL
ANION GAP SERPL CALC-SCNC: 5 MMOL/L (ref 5–15)
BASOPHILS # BLD: 0 K/UL (ref 0–0.1)
BASOPHILS NFR BLD: 0 % (ref 0–1)
BLD PROD TYP BPU: NORMAL
BLOOD BANK BLOOD PRODUCT EXPIRATION DATE: NORMAL
BLOOD BANK DISPENSE STATUS: NORMAL
BLOOD BANK ISBT PRODUCT BLOOD TYPE: 600
BLOOD BANK PRODUCT CODE: NORMAL
BLOOD BANK UNIT TYPE AND RH: NORMAL
BLOOD GROUP ANTIBODIES SERPL: NEGATIVE
BPU ID: NORMAL
BUN SERPL-MCNC: 22 MG/DL (ref 6–20)
BUN/CREAT SERPL: 32 (ref 12–20)
CA-I BLD-MCNC: 8.2 MG/DL (ref 8.5–10.1)
CHLORIDE SERPL-SCNC: 106 MMOL/L (ref 97–108)
CO2 SERPL-SCNC: 28 MMOL/L (ref 21–32)
CREAT SERPL-MCNC: 0.68 MG/DL (ref 0.55–1.02)
CROSSMATCH RESULT: NORMAL
DIFFERENTIAL METHOD BLD: ABNORMAL
EOSINOPHIL # BLD: 0.2 K/UL (ref 0–0.4)
EOSINOPHIL NFR BLD: 1 % (ref 0–7)
ERYTHROCYTE [DISTWIDTH] IN BLOOD BY AUTOMATED COUNT: 15.6 % (ref 11.5–14.5)
ERYTHROCYTE [DISTWIDTH] IN BLOOD BY AUTOMATED COUNT: 15.7 % (ref 11.5–14.5)
GLUCOSE SERPL-MCNC: 121 MG/DL (ref 65–100)
HCT VFR BLD AUTO: 23.6 % (ref 35–47)
HCT VFR BLD AUTO: 23.7 % (ref 35–47)
HGB BLD-MCNC: 7.8 G/DL (ref 11.5–16)
HGB BLD-MCNC: 7.9 G/DL (ref 11.5–16)
IMM GRANULOCYTES # BLD AUTO: 0.1 K/UL (ref 0–0.04)
IMM GRANULOCYTES NFR BLD AUTO: 1 % (ref 0–0.5)
LYMPHOCYTES # BLD: 0.9 K/UL (ref 0.8–3.5)
LYMPHOCYTES NFR BLD: 5 % (ref 12–49)
MCH RBC QN AUTO: 28.7 PG (ref 26–34)
MCH RBC QN AUTO: 29.2 PG (ref 26–34)
MCHC RBC AUTO-ENTMCNC: 32.9 G/DL (ref 30–36.5)
MCHC RBC AUTO-ENTMCNC: 33.5 G/DL (ref 30–36.5)
MCV RBC AUTO: 87.1 FL (ref 80–99)
MCV RBC AUTO: 87.1 FL (ref 80–99)
MONOCYTES # BLD: 0.7 K/UL (ref 0–1)
MONOCYTES NFR BLD: 4 % (ref 5–13)
NEUTS SEG # BLD: 15.3 K/UL (ref 1.8–8)
NEUTS SEG NFR BLD: 89 % (ref 32–75)
NRBC # BLD: 0 K/UL (ref 0–0.01)
NRBC # BLD: 0.02 K/UL (ref 0–0.01)
NRBC BLD-RTO: 0 PER 100 WBC
NRBC BLD-RTO: 0.1 PER 100 WBC
PLATELET # BLD AUTO: 175 K/UL (ref 150–400)
PLATELET # BLD AUTO: 196 K/UL (ref 150–400)
PMV BLD AUTO: 10 FL (ref 8.9–12.9)
PMV BLD AUTO: 10.3 FL (ref 8.9–12.9)
POTASSIUM SERPL-SCNC: 3.7 MMOL/L (ref 3.5–5.1)
RBC # BLD AUTO: 2.71 M/UL (ref 3.8–5.2)
RBC # BLD AUTO: 2.72 M/UL (ref 3.8–5.2)
SODIUM SERPL-SCNC: 139 MMOL/L (ref 136–145)
SPECIMEN EXP DATE BLD: NORMAL
TRANSFUSION STATUS PATIENT QL: NORMAL
UNIT DIVISION: 0
UNIT ISSUE DATE/TIME: NORMAL
WBC # BLD AUTO: 17.1 K/UL (ref 3.6–11)
WBC # BLD AUTO: 17.5 K/UL (ref 3.6–11)

## 2024-07-22 PROCEDURE — 97162 PT EVAL MOD COMPLEX 30 MIN: CPT

## 2024-07-22 PROCEDURE — 99024 POSTOP FOLLOW-UP VISIT: CPT

## 2024-07-22 PROCEDURE — 6370000000 HC RX 637 (ALT 250 FOR IP): Performed by: ORTHOPAEDIC SURGERY

## 2024-07-22 PROCEDURE — 85025 COMPLETE CBC W/AUTO DIFF WBC: CPT

## 2024-07-22 PROCEDURE — 6360000002 HC RX W HCPCS: Performed by: FAMILY MEDICINE

## 2024-07-22 PROCEDURE — 36415 COLL VENOUS BLD VENIPUNCTURE: CPT

## 2024-07-22 PROCEDURE — 97530 THERAPEUTIC ACTIVITIES: CPT

## 2024-07-22 PROCEDURE — 2700000000 HC OXYGEN THERAPY PER DAY

## 2024-07-22 PROCEDURE — 6370000000 HC RX 637 (ALT 250 FOR IP): Performed by: HOSPITALIST

## 2024-07-22 PROCEDURE — 80048 BASIC METABOLIC PNL TOTAL CA: CPT

## 2024-07-22 PROCEDURE — 1100000000 HC RM PRIVATE

## 2024-07-22 PROCEDURE — 97165 OT EVAL LOW COMPLEX 30 MIN: CPT

## 2024-07-22 PROCEDURE — 2580000003 HC RX 258: Performed by: HOSPITALIST

## 2024-07-22 PROCEDURE — 94761 N-INVAS EAR/PLS OXIMETRY MLT: CPT

## 2024-07-22 PROCEDURE — 85027 COMPLETE CBC AUTOMATED: CPT

## 2024-07-22 RX ADMIN — DULOXETINE HYDROCHLORIDE 20 MG: 20 CAPSULE, DELAYED RELEASE ORAL at 08:52

## 2024-07-22 RX ADMIN — ACETAMINOPHEN 1000 MG: 500 TABLET ORAL at 14:09

## 2024-07-22 RX ADMIN — ROPINIROLE 12 MG: 2 TABLET, FILM COATED, EXTENDED RELEASE ORAL at 08:52

## 2024-07-22 RX ADMIN — GABAPENTIN 100 MG: 100 CAPSULE ORAL at 14:09

## 2024-07-22 RX ADMIN — CALCIUM CARBONATE 500 MG: 500 TABLET, CHEWABLE ORAL at 08:52

## 2024-07-22 RX ADMIN — CALCIUM CARBONATE 500 MG: 500 TABLET, CHEWABLE ORAL at 21:08

## 2024-07-22 RX ADMIN — PANTOPRAZOLE SODIUM 20 MG: 20 TABLET, DELAYED RELEASE ORAL at 08:52

## 2024-07-22 RX ADMIN — CELECOXIB 100 MG: 100 CAPSULE ORAL at 08:52

## 2024-07-22 RX ADMIN — GABAPENTIN 100 MG: 100 CAPSULE ORAL at 08:52

## 2024-07-22 RX ADMIN — ACETAMINOPHEN 1000 MG: 500 TABLET ORAL at 08:52

## 2024-07-22 RX ADMIN — POLYETHYLENE GLYCOL 3350 17 G: 17 POWDER, FOR SOLUTION ORAL at 08:53

## 2024-07-22 RX ADMIN — GABAPENTIN 100 MG: 100 CAPSULE ORAL at 21:08

## 2024-07-22 RX ADMIN — METOPROLOL TARTRATE 50 MG: 50 TABLET, FILM COATED ORAL at 08:52

## 2024-07-22 RX ADMIN — SODIUM CHLORIDE: 9 INJECTION, SOLUTION INTRAVENOUS at 14:28

## 2024-07-22 RX ADMIN — IRON SUCROSE 200 MG: 20 INJECTION, SOLUTION INTRAVENOUS at 08:39

## 2024-07-22 RX ADMIN — ACETAMINOPHEN 1000 MG: 500 TABLET ORAL at 21:08

## 2024-07-22 RX ADMIN — CELECOXIB 100 MG: 100 CAPSULE ORAL at 21:08

## 2024-07-22 RX ADMIN — ATORVASTATIN CALCIUM 40 MG: 40 TABLET, FILM COATED ORAL at 08:52

## 2024-07-22 RX ADMIN — LEVOTHYROXINE SODIUM 50 MCG: 0.03 TABLET ORAL at 05:43

## 2024-07-22 RX ADMIN — SODIUM CHLORIDE, PRESERVATIVE FREE 10 ML: 5 INJECTION INTRAVENOUS at 08:53

## 2024-07-22 ASSESSMENT — PAIN SCALES - GENERAL
PAINLEVEL_OUTOF10: 8
PAINLEVEL_OUTOF10: 3
PAINLEVEL_OUTOF10: 3
PAINLEVEL_OUTOF10: 0
PAINLEVEL_OUTOF10: 3

## 2024-07-22 ASSESSMENT — PAIN DESCRIPTION - DESCRIPTORS: DESCRIPTORS: ACHING

## 2024-07-22 ASSESSMENT — PAIN DESCRIPTION - LOCATION: LOCATION: HIP

## 2024-07-22 ASSESSMENT — PAIN DESCRIPTION - ORIENTATION: ORIENTATION: LEFT

## 2024-07-22 NOTE — PROGRESS NOTES
The  dropped by to visit with the patient in Room 441 during rounding. Upon entering the room, the patient was sleeping peacefully .   provided the ministry of presence and said prayer of blessing and healing on behalf of the patient and medical staff involved in her care.     left a pastoral care note on the patient's bed table with a reminder that chaplains are available as needed and to please notifiy her nurse if she would like a visit in the future at a better time.    Chaplain Rosita Martínez M.Div.

## 2024-07-22 NOTE — PLAN OF CARE
OCCUPATIONAL THERAPY EVALUATION  Patient: Jericho Gómez (89 y.o. 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 female presenting to [facility] on [facility] with s/p fall where pt sustained a R distal femur fracture. Pt has a PMHx that includes HTN, hypothyroidism, afib, spinal stenosis, and osteoporosis. Pt is currently being treated for R distal femur fracture and is s/p intramedullary nailing of R femur on 7/20/2024 and is currently TTWB for 6 weeks. . Pt received semi-supine in bed upon arrival, AXO x1, and agreeable to OT evaluation.     Based on current observations, pt presents with decreased  functional mobility, independence in ADLs, strength, activity tolerance, endurance (see below for objective details and assist levels).     Overall, pt tolerates session fair with poor activity tolerance noted with frequent rest breaks required and constant verbal cueing to attend to task during tx.. Pt required MAX A for supine to sit, and once seated EOB cueing was provided for improved posture and positioning to maintain safety. Education for TTWB was provided verbally in preporation for bed to BSC transfer. Pt with poor carryover and impulsivity noted. Verbal cueing was required for proper task sequencing. TOTAL A was required for toilet hygiene. Low activity tolerance noted, vitals taken throughout session, see chart below. Pt will benefit from  Training  Bed Mobility Training: Yes  Overall Level of Assistance: Moderate assistance;Maximum assistance;Assist X2;Additional time  Interventions: Verbal cues;Demonstration;Manual cues;Safety awareness training;Tactile cues;Weight shifting training/pressure relief  Rolling: Moderate assistance;Additional time;Assist X2;Maximum assistance  Supine to Sit: Moderate assistance;Additional time;Assist X1;Minimum assistance;Assist X2  Sit to Supine: Maximum assistance;Assist X2;Additional time  Scooting: Maximum assistance;Assist X2;Additional time    Transfers:  Transfer Training  Transfer Training: Yes  Overall Level of Assistance: Moderate assistance;Maximum assistance;Assist X1;Assist X2;Additional time  Interventions: Demonstration;Verbal cues;Safety awareness training;Tactile cues  Sit to Stand: Moderate assistance;Maximum assistance;Assist X1;Assist X2;Additional time  Stand to Sit: Moderate assistance;Maximum assistance;Assist X1;Assist X2;Additional time  Toilet Transfer: Moderate assistance;Maximum assistance;Assist X1;Assist X2    Balance:  Balance  Sitting: Intact  Standing: Impaired  Standing - Static: Poor;Constant support  Standing - Dynamic: Poor;Constant support    ADL Assessment:   LE Dressing: Dependent/Total  LE Dressing Skilled Clinical Factors: unbale to don socks, even when prompted, poor cognition  Toileting: Maximum assistance  Toileting Skilled Clinical Factors: Max x2 from bed to BSC    Therapeutic Intervention provided:   energy conservation and toileting    Functional Measure:    [facility] AM-PAC \"6 Clicks\"                                                       Daily Activity Inpatient Short Form  How much help from another person does the patient currently need... Total; A Lot A Little None   1.  Putting on and taking off regular lower body clothing? [x]  1 []  2 []  3 []  4   2.  Bathing (including washing, rinsing, drying)? [x]  1 []  2 []  3 []  4   3.  Toileting, which includes  using toilet, bedpan or urinal? [x] 1 []  2 []  3 []  4   4.  Putting on and taking off regular upper body clothing? []  1 [x]  2 []  3 []  4   5.  Taking care of personal grooming such as brushing teeth? []  1 [x]  2 []  3 []  4   6.  Eating meals? []  1 []  2 [x]  3 []  4   © , Trustees of [facility], under license to [employer]. All rights reserved     Score: 10/24     Interpretation of Tool:  Represents clinically-significant functional categories (i.e. Activities of daily living).  Percentage of Impairment CH    0%   CI    1-19% CJ    20-39% CK    40-59% CL    60-79% CM    80-99% CN     100%   [facility]  Score 6-24 24 23 20-22 15-19 10-14 7-9 6     Occupational Therapy Evaluation Charge Determination   History Examination Decision-Making   LOW Complexity : Brief history review  MEDIUM Complexity: 3-5 Performance deficits relating to physical, cognitive, or psychosocial skills that result in activity limitations and/or participation restrictions MEDIUM Complexity: Patient may present with comorbidities that affect occupational performance. Minimal to moderate modifications of tasks or assist (eg. physical or verbal) with assist is necessary to enable pt to complete eval      Based on the above components, the patient evaluation is determined to be of the following complexity level: Low    Pain Ratin/10   Pain Intervention(s):       Activity Tolerance:   Fair  and requires frequent rest breaks    After treatment patient left in no apparent distress:    Patient left in no apparent distress in bed, Call bell within reach, and Bed/ chair alarm activated, bed locked and in lowest position    COMMUNICATION/EDUCATION:   The patient’s plan of care was discussed with: Physical therapist and Registered nurse    Patient Education  Education Given To: Patient  Education Provided: Role of Therapy;Precautions;Plan of Care;ADL Adaptive Strategies  Education Method: Verbal  Barriers to Learning:

## 2024-07-22 NOTE — PROGRESS NOTES
ORTHOPEDIC PROGRESS NOTE    Patient: Jericho Gómez MRN: [id number]  SSN: [id number]    YOB: 1935  Age: 89 y.o.  Sex: female      Subjective:      Jericho Gómez is a 89 y.o. female who is being seen for left distal femur fracture following a fall. Patient seen at bedside with family present. They report the patient went out to the [facility] yesterday and fell while going up the stairs injuring her left leg. The family states she has had several falls in the past, two falls which resulted in wrist fractures one of which underwent surgical fixation by Dr. Ramirez in [place], [place]. The patient is from [place] and receives most of her care there. However, following the fall yesterday the family wanted the patient to go to [facility], in hopes that Dr. Ramirez could do surgery for her mother as he fixed her wrist during a previous fall. While at [facility] ED X-rays were obtained and revealed a distal femoral fracture. It was recommended by Dr. Ramirez the patient be sent to [facility] for a higher level of care. Patient's family wanted the patient to be transported to [place], [place] but unfortunately were unable to. Orthopedics here consulted for further evaluation and management.     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 Family states patient lives alone and is independent with her ADLs. She rarely uses a walker but has one at home.    Denies N/V, fever, chills, chest pain, abdominal pain, numbness/tingling, or pain elsewhere other than the left leg.     7/21/2024: Postop day 1 from left retrograde intramedullary nail for a distal femur periprosthetic fracture.  Patient had a hemoglobin of 6.1 this morning and underwent transfusion with postop transfusion CBC pending.  Patient's son and daughter-in-law are present at bedside.  The patient is to be toe-touch weightbearing for 6 weeks given the spiral fracture  mouth daily    Zoe Kendall MD   fentaNYL (DURAGESIC) 50 MCG/HR Place 1 patch onto the skin every 72 hours.    Zoe Kendall MD   gabapentin (NEURONTIN) 100 MG capsule Take 1 capsule by mouth 3 times daily.    Zoe Kendall MD   atorvastatin (LIPITOR) 40 MG tablet Take 1 tablet by mouth daily    Zoe Kendall MD   levothyroxine (SYNTHROID) 50 MCG tablet Take 1 tablet by mouth Daily    Zoe Kendall MD   hydroCHLOROthiazide (MICROZIDE) 12.5 MG capsule Take 1 capsule by mouth every morning    Zoe Kendall MD   meloxicam (MOBIC) 15 MG tablet Take 1 tablet by mouth daily    Zoe Kendall MD   metoprolol tartrate (LOPRESSOR) 50 MG tablet Take 1 tablet by mouth 2 times daily    Zoe Kendall MD   pantoprazole (PROTONIX) 20 MG tablet Take 1 tablet by mouth daily    Zoe Kendall MD   rOPINIRole (REQUIP XL) 12 MG TB24 extended release tablet Take 1 tablet by mouth daily    Zoe Kendall MD   furosemide (LASIX) 20 MG tablet Take 0.5 tablets by mouth daily    Zoe Kendall MD   vitamin B-12 (CYANOCOBALAMIN) 100 MCG tablet Take 0.5 tablets by mouth daily    Zoe Kendall MD   calcium carbonate (OSCAL) 500 MG TABS tablet Take 1 tablet by mouth daily    Zoe Kendall MD       No Known Allergies    Review of Systems:  Review of Systems   Constitutional:  Negative for chills and fever.   HENT:  Negative for sore throat.    Respiratory:  Positive for shortness of breath (began prior to fall).    Cardiovascular:  Negative for chest pain.   Gastrointestinal:  Negative for abdominal pain, nausea and vomiting.   Musculoskeletal:  Positive for arthralgias (left leg), gait problem, joint swelling (left leg) and myalgias (left leg).   Skin:  Negative for wound.   Neurological:  Negative for numbness.   Psychiatric/Behavioral:  Negative for agitation.            Objective:     Current Facility-Administered Medications   Medication Dose

## 2024-07-22 NOTE — CARE COORDINATION
IDR  72HRS.  Spoke w/admissions coordinator (Delmis) @ [facility].  Writer provided a different fax number to send clinicals.     Recent clinicals sent pending acceptance.  No insurance auth is needed. Pending acceptance and bed availability when medically stable.     Delmis (admissions/[facility]) @ [phone].  Fax#[phone].        VIMAL Monte

## 2024-07-22 NOTE — PLAN OF CARE
PHYSICAL THERAPY EVALUATION  Patient: Jericho Gómez (89 y.o. 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]  Closed fracture of femur, unspecified fracture morphology, unspecified laterality, unspecified portion of femur, initial encounter ([facility]) [S72.90XA]  Procedure(s) (LRB):  FEMUR INTRAMEDULLARY NAIL RETROGRADE (Left) 2 Days Post-Op   Precautions: Fall Risk, Weight Bearing     Left Lower Extremity Weight Bearing: Toe Touch Weight Bearing                Recommendations for nursing mobility: Encourage HEP in prep for ADLs/mobility; see handout for details, Frequent repositioning to prevent skin breakdown, Use of bed/chair alarm for safety, and LE elevation for management of edema    In place during session: Peripheral IV, External Catheter, and EKG/telemetry     ASSESSMENT  Pt is a 89 y.o. female admitted on 7/18/2024 for fall ; pt currently being treated for IM nailing left femur 7/20 . Pt semi supine  upon PT/OT arrival, reluctantly agreeable to evaluation. Pt A&O x 4.      Based on the objective data described below, the patient currently presents with impaired functional mobility, impaired ability to perform high-level IADLs, decreased ROM, impaired strength, decreased activity tolerance, impaired balance, and impaired posture. (See below for objective details and assist levels).     Overall pt tolerated session fair today with PT. Pt required mod/max  A  X 2 for bed mobility and transfers. Pt amb 4 feet with RW, gt belt and Max A x 2 ; demonstrates very poor alertness, kept eyes closed and required constant cues to stay focus  with therapy to assist with her care.Patient sat at eob with assist and reported she was going to throw up so Emesis bag was given, no vomiting.Patient than reported actually I am going to do something else and indicated BM.Patient was than transfer to the bedside commode with mod/max  Assistance: Moderate assistance;Maximum assistance;Assist X1;Assist X2;Additional time  Distance (ft): 3 Feet  Assistive Device: Walker, rolling;Gait belt  Interventions: Demonstration;Verbal cues;Manual cues;Safety awareness training;Tactile cues;Visual cues  Speed/Cherelle: Fluctuations  Step Length: Left shortened  Gait Abnormalities: Antalgic;Decreased step clearance                   Therapeutic Intervention provided:   bed mobility , EOB transfers, OOB transfers, amb with AD, LE therapeutic exercises, seated static balance, and standing static/dynamic balance    Functional Measure:  [facility]-PAC “6 Clicks”         Basic Mobility Inpatient Short Form  How much difficulty does the patient currently have... Unable A Lot A Little None   1.  Turning over in bed (including adjusting bedclothes, sheets and blankets)?   [] 1   [x] 2   [] 3   [] 4   2.  Sitting down on and standing up from a chair with arms ( e.g., wheelchair, bedside commode, etc.)   [] 1   [x] 2   [] 3   [] 4   3.  Moving from lying on back to sitting on the side of the bed?   [] 1   [x] 2   [] 3   [] 4          How much help from another person does the patient currently need... Total A Lot A Little None   4.  Moving to and from a bed to a chair (including a wheelchair)?   [] 1   [x] 2   [] 3   [] 4   5.  Need to walk in hospital room?   [] 1   [x] 2   [] 3   [] 4   6.  Climbing 3-5 steps with a railing?   [x] 1   [] 2   [] 3   [] 4   © 2007, Trustees of [facility], under license to [employer]. All rights reserved     Score:  Initial: 11/24 Most Recent: X (Date: 7/22/2024 )   Interpretation of Tool:  Represents activities that are increasingly more difficult (i.e. Bed mobility, Transfers, Gait).  Score 24 23 22-20 19-15 14-10 9-7 6   Modifier CH CI CJ CK CL CM CN         Physical Therapy Evaluation Charge Determination   History Examination Presentation Decision-Making   LOW Complexity : Zero comorbidities / personal factors

## 2024-07-22 NOTE — PLAN OF CARE
Problem: Discharge Planning  Goal: Discharge to home or other facility with appropriate resources  7/21/2024 2134 by Jazmyn Kidd RN  Outcome: Progressing  Flowsheets (Taken 7/21/2024 2020)  Discharge to home or other facility with appropriate resources: Identify barriers to discharge with patient and caregiver  7/21/2024 1116 by Julito Unger RN  Outcome: Progressing  Flowsheets (Taken 7/21/2024 0800)  Discharge to home or other facility with appropriate resources: Identify barriers to discharge with patient and caregiver     Problem: Pain  Goal: Verbalizes/displays adequate comfort level or baseline comfort level  7/21/2024 2134 by Jazmyn Kidd RN  Outcome: Progressing  7/21/2024 1116 by Julito Unger RN  Outcome: Progressing     Problem: Skin/Tissue Integrity  Goal: Absence of new skin breakdown  Description: 1.  Monitor for areas of redness and/or skin breakdown  2.  Assess vascular access sites hourly  3.  Every 4-6 hours minimum:  Change oxygen saturation probe site  4.  Every 4-6 hours:  If on nasal continuous positive airway pressure, respiratory therapy assess nares and determine need for appliance change or resting period.  7/21/2024 2134 by Jazmyn Kidd RN  Outcome: Progressing  7/21/2024 1116 by Julito Unger RN  Outcome: Progressing     Problem: Safety - Adult  Goal: Free from fall injury  7/21/2024 2134 by Jazmyn Kidd RN  Outcome: Progressing  7/21/2024 1116 by Julito Unger RN  Outcome: Progressing     Problem: ABCDS Injury Assessment  Goal: Absence of physical injury  7/21/2024 2134 by Jazmyn Kidd RN  Outcome: Progressing  7/21/2024 1116 by Julito Unger RN  Outcome: Progressing

## 2024-07-22 NOTE — PROGRESS NOTES
made second attempt to complete spiritual assessment. Reviewed chart and conferred with RN, Julito. Pt is asleep.  left pastoral msg notifying pt of attempted visit.  provided sustaining ministry of presence.  Spiritual assessment could not be completed at this time.     Please contact Spiritual Health for any emotional/spiritual needs and/or further referrals. Thank you.    Rev. Capri Birmingham MDIV   can be reached by calling the  at [facility]  [phone]

## 2024-07-22 NOTE — PROGRESS NOTES
Hospitalist Progress Note               Daily Progress Note: 7/22/2024      Hospital Day: 5     Chief complaint:   Chief Complaint   Patient presents with    Leg Injury        Subjective:   Hospital course to date:  Jericho Gómez is a 89 y.o. female with pmh of HTN and PAF who presented to the hospital from free standing ED w/ an acute femur fracture.  The pt suffered a traumatic mechanical fall while walking up a set of concrete steps.  Post fall immediate pain was described and pt was unable to stand or ambulate.  Pt denied any head injury or LOC and was found to have a femur fxr via Xray.  Pt was hemodynamically stable on arrival and lab work reviewed.      --------  7/20-Patient is seen today for follow-up.  She is recently back from her surgery.  She is somewhat somnolent but easily awakens.  She does states that she feels cold otherwise she has no other specific complaints.  She is not short of breath     --------  7/21-Patient is seen today for follow-up.  No reported issues from overnight.  Patient is seen in her room she is awake and alert lying in bed.  She feels very tired and is having some pain in her leg.  She denies any shortness of breath or chest pain.  When asked the patient states she has not been using her incentive spirometer lately    --------  7/22-Patient is seen today for follow-up.  No reported issues from overnight.  Currently patient is resting comfortably in bed.  She is on supplemental oxygen.  She denies any shortness of breath.  She does feel tired today and weak, she reports that her pain at this time is appropriately controlled with the current pain medication regimen.  She denies any other symptoms        Medications reviewed  Current Facility-Administered Medications   Medication Dose Route Frequency    iron sucrose (VENOFER) injection 200 mg  200 mg IntraVENous Q24H    0.9 % sodium chloride infusion   IntraVENous PRN    celecoxib (CELEBREX) capsule 100 mg  100 mg Oral

## 2024-07-23 LAB
25(OH)D3 SERPL-MCNC: 59.9 NG/ML (ref 30–100)
APPEARANCE UR: CLEAR
BACTERIA URNS QL MICRO: NEGATIVE /HPF
BILIRUB UR QL: NEGATIVE
COLOR UR: ABNORMAL
EPITH CASTS URNS QL MICRO: ABNORMAL /LPF
ERYTHROCYTE [DISTWIDTH] IN BLOOD BY AUTOMATED COUNT: 15.9 % (ref 11.5–14.5)
GLUCOSE UR STRIP.AUTO-MCNC: NEGATIVE MG/DL
HCT VFR BLD AUTO: 22.6 % (ref 35–47)
HGB BLD-MCNC: 7.4 G/DL (ref 11.5–16)
HGB UR QL STRIP: NEGATIVE
KETONES UR QL STRIP.AUTO: 5 MG/DL
LEUKOCYTE ESTERASE UR QL STRIP.AUTO: ABNORMAL
MCH RBC QN AUTO: 28.6 PG (ref 26–34)
MCHC RBC AUTO-ENTMCNC: 32.7 G/DL (ref 30–36.5)
MCV RBC AUTO: 87.3 FL (ref 80–99)
MUCOUS THREADS URNS QL MICRO: ABNORMAL /LPF
NITRITE UR QL STRIP.AUTO: NEGATIVE
NRBC # BLD: 0.03 K/UL (ref 0–0.01)
NRBC BLD-RTO: 0.2 PER 100 WBC
PH UR STRIP: 5 (ref 5–8)
PLATELET # BLD AUTO: 219 K/UL (ref 150–400)
PMV BLD AUTO: 10 FL (ref 8.9–12.9)
PROT UR STRIP-MCNC: NEGATIVE MG/DL
RBC # BLD AUTO: 2.59 M/UL (ref 3.8–5.2)
RBC #/AREA URNS HPF: ABNORMAL /HPF (ref 0–5)
SP GR UR REFRACTOMETRY: 1.02 (ref 1–1.03)
URINE CULTURE IF INDICATED: ABNORMAL
UROBILINOGEN UR QL STRIP.AUTO: 0.1 EU/DL (ref 0.1–1)
WBC # BLD AUTO: 17.2 K/UL (ref 3.6–11)
WBC URNS QL MICRO: ABNORMAL /HPF (ref 0–4)

## 2024-07-23 PROCEDURE — 2580000003 HC RX 258: Performed by: HOSPITALIST

## 2024-07-23 PROCEDURE — 6370000000 HC RX 637 (ALT 250 FOR IP): Performed by: ORTHOPAEDIC SURGERY

## 2024-07-23 PROCEDURE — 1100000000 HC RM PRIVATE

## 2024-07-23 PROCEDURE — 6360000002 HC RX W HCPCS: Performed by: FAMILY MEDICINE

## 2024-07-23 PROCEDURE — 97530 THERAPEUTIC ACTIVITIES: CPT

## 2024-07-23 PROCEDURE — 94761 N-INVAS EAR/PLS OXIMETRY MLT: CPT

## 2024-07-23 PROCEDURE — 87086 URINE CULTURE/COLONY COUNT: CPT

## 2024-07-23 PROCEDURE — 36415 COLL VENOUS BLD VENIPUNCTURE: CPT

## 2024-07-23 PROCEDURE — 81001 URINALYSIS AUTO W/SCOPE: CPT

## 2024-07-23 PROCEDURE — 6360000002 HC RX W HCPCS: Performed by: HOSPITALIST

## 2024-07-23 PROCEDURE — 99024 POSTOP FOLLOW-UP VISIT: CPT

## 2024-07-23 PROCEDURE — 6370000000 HC RX 637 (ALT 250 FOR IP): Performed by: HOSPITALIST

## 2024-07-23 PROCEDURE — 85027 COMPLETE CBC AUTOMATED: CPT

## 2024-07-23 RX ADMIN — ATORVASTATIN CALCIUM 40 MG: 40 TABLET, FILM COATED ORAL at 08:52

## 2024-07-23 RX ADMIN — GABAPENTIN 100 MG: 100 CAPSULE ORAL at 16:17

## 2024-07-23 RX ADMIN — GABAPENTIN 100 MG: 100 CAPSULE ORAL at 21:14

## 2024-07-23 RX ADMIN — ACETAMINOPHEN 1000 MG: 500 TABLET ORAL at 16:17

## 2024-07-23 RX ADMIN — CALCIUM CARBONATE 500 MG: 500 TABLET, CHEWABLE ORAL at 21:14

## 2024-07-23 RX ADMIN — ENOXAPARIN SODIUM 40 MG: 100 INJECTION SUBCUTANEOUS at 12:14

## 2024-07-23 RX ADMIN — ONDANSETRON 4 MG: 2 INJECTION INTRAMUSCULAR; INTRAVENOUS at 09:21

## 2024-07-23 RX ADMIN — GABAPENTIN 100 MG: 100 CAPSULE ORAL at 08:51

## 2024-07-23 RX ADMIN — LEVOTHYROXINE SODIUM 50 MCG: 0.03 TABLET ORAL at 06:31

## 2024-07-23 RX ADMIN — SODIUM CHLORIDE, PRESERVATIVE FREE 10 ML: 5 INJECTION INTRAVENOUS at 08:52

## 2024-07-23 RX ADMIN — POLYETHYLENE GLYCOL 3350 17 G: 17 POWDER, FOR SOLUTION ORAL at 08:52

## 2024-07-23 RX ADMIN — DULOXETINE HYDROCHLORIDE 20 MG: 20 CAPSULE, DELAYED RELEASE ORAL at 08:51

## 2024-07-23 RX ADMIN — PANTOPRAZOLE SODIUM 20 MG: 20 TABLET, DELAYED RELEASE ORAL at 08:52

## 2024-07-23 RX ADMIN — METOPROLOL TARTRATE 50 MG: 50 TABLET, FILM COATED ORAL at 08:52

## 2024-07-23 RX ADMIN — CELECOXIB 100 MG: 100 CAPSULE ORAL at 08:51

## 2024-07-23 RX ADMIN — CALCIUM CARBONATE 500 MG: 500 TABLET, CHEWABLE ORAL at 08:51

## 2024-07-23 RX ADMIN — IRON SUCROSE 200 MG: 20 INJECTION, SOLUTION INTRAVENOUS at 09:03

## 2024-07-23 RX ADMIN — CELECOXIB 100 MG: 100 CAPSULE ORAL at 21:14

## 2024-07-23 RX ADMIN — ROPINIROLE 12 MG: 2 TABLET, FILM COATED, EXTENDED RELEASE ORAL at 09:04

## 2024-07-23 RX ADMIN — ACETAMINOPHEN 1000 MG: 500 TABLET ORAL at 21:14

## 2024-07-23 RX ADMIN — ACETAMINOPHEN 1000 MG: 500 TABLET ORAL at 08:52

## 2024-07-23 ASSESSMENT — PAIN - FUNCTIONAL ASSESSMENT: PAIN_FUNCTIONAL_ASSESSMENT: PREVENTS OR INTERFERES SOME ACTIVE ACTIVITIES AND ADLS

## 2024-07-23 ASSESSMENT — PAIN SCALES - GENERAL
PAINLEVEL_OUTOF10: 6
PAINLEVEL_OUTOF10: 3
PAINLEVEL_OUTOF10: 0

## 2024-07-23 ASSESSMENT — PAIN DESCRIPTION - DESCRIPTORS: DESCRIPTORS: ACHING

## 2024-07-23 ASSESSMENT — PAIN DESCRIPTION - ORIENTATION
ORIENTATION: ANTERIOR;POSTERIOR
ORIENTATION: LEFT

## 2024-07-23 ASSESSMENT — PAIN DESCRIPTION - LOCATION
LOCATION: GENERALIZED
LOCATION: HIP

## 2024-07-23 NOTE — PLAN OF CARE
Problem: Discharge Planning  Goal: Discharge to home or other facility with appropriate resources  7/22/2024 2325 by Isabel Winters RN  Outcome: Progressing  Flowsheets (Taken 7/22/2024 1945)  Discharge to home or other facility with appropriate resources:   Identify barriers to discharge with patient and caregiver   Arrange for needed discharge resources and transportation as appropriate     Problem: Pain  Goal: Verbalizes/displays adequate comfort level or baseline comfort level  7/23/2024 0739 by Marine Reddy RN  Outcome: Progressing  7/22/2024 2325 by Isabel Winters RN  Outcome: Progressing  Flowsheets (Taken 7/22/2024 1945)  Verbalizes/displays adequate comfort level or baseline comfort level:   Encourage patient to monitor pain and request assistance   Assess pain using appropriate pain scale   Administer analgesics based on type and severity of pain and evaluate response   Implement non-pharmacological measures as appropriate and evaluate response   Consider cultural and social influences on pain and pain management   Notify Licensed Independent Practitioner if interventions unsuccessful or patient reports new pain     Problem: Skin/Tissue Integrity  Goal: Absence of new skin breakdown  Description: 1.  Monitor for areas of redness and/or skin breakdown  2.  Assess vascular access sites hourly  3.  Every 4-6 hours minimum:  Change oxygen saturation probe site  4.  Every 4-6 hours:  If on nasal continuous positive airway pressure, respiratory therapy assess nares and determine need for appliance change or resting period.  7/22/2024 2325 by Isabel Winters, RN  Outcome: Progressing     Problem: Safety - Adult  Goal: Free from fall injury  7/22/2024 2325 by Isabel Winters, RN  Outcome: Progressing     Problem: ABCDS Injury Assessment  Goal: Absence of physical injury  7/22/2024 2325 by Isabel Winters, RN  Outcome: Progressing

## 2024-07-23 NOTE — PLAN OF CARE
PHYSICAL THERAPY TREATMENT     Patient: Jericho Gómez (89 y.o. 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  ASSESSMENT  Patient continues with skilled PT services and is slowly progressing towards goals. Pt semi supine  upon PT arrival, agreeable to session. Pt A&O x 4. (See below for objective details and assist levels).     Overall pt tolerated session fair today with PT. Patient able to perform bed mob, transfer, sit to stand and exs at eob with extended slow pace to perform any activities.Left leg pain.sit to stand with max Ax1 with time x 3.Patient did have BM so was cleaned on first attempt. Will continue to benefit from skilled PT services, and will continue to progress as tolerated. Potential barriers for safe discharge: pt has poor safety awareness, pt is a high fall risk, pt is not safe to be alone, and pt has new weight bearing restrictions limiting activity or patient is unable to maintain. Current PT DC recommendation Skilled Nursing Facility once medically appropriate.     Start of Session End of Session   SPO2 (%) 95 95   Heart Rate  Strategies;Home Exercise Program;Precautions;Transfer Training;Energy Conservation  Education Method: Demonstration;Verbal;Teach Back  Education Outcome: Verbalized understanding;Demonstrated understanding        Bettie Ramirez, PT  Minutes: 40

## 2024-07-23 NOTE — PROGRESS NOTES
OT tx attempted at 1325 however pt declined even with encouragement and offer of just working on UE therex in bed. Will continue to follow and re-attempt OT at a later time. Thank you.

## 2024-07-23 NOTE — PROGRESS NOTES
ORTHOPEDIC PROGRESS NOTE    Patient: Jericho Gómez MRN: [id number]  SSN: [id number]    YOB: 1935  Age: 89 y.o.  Sex: female      Subjective:      Jericho Gómez is a 89 y.o. female who is being seen for left distal femur fracture following a fall. Patient seen at bedside with family present. They report the patient went out to the [facility] yesterday and fell while going up the stairs injuring her left leg. The family states she has had several falls in the past, two falls which resulted in wrist fractures one of which underwent surgical fixation by Dr. Ramirez in [place], [place]. The patient is from [place] and receives most of her care there. However, following the fall yesterday the family wanted the patient to go to [facility], in hopes that Dr. Ramirez could do surgery for her mother as he fixed her wrist during a previous fall. While at [facility] ED X-rays were obtained and revealed a distal femoral fracture. It was recommended by Dr. Ramirez the patient be sent to [facility] for a higher level of care. Patient's family wanted the patient to be transported to [place], [place] but unfortunately were unable to. Orthopedics here consulted for further evaluation and management.     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 Family states patient lives alone and is independent with her ADLs. She rarely uses a walker but has one at home.    Denies N/V, fever, chills, chest pain, abdominal pain, numbness/tingling, or pain elsewhere other than the left leg.     7/21/2024: Postop day 1 from left retrograde intramedullary nail for a distal femur periprosthetic fracture.  Patient had a hemoglobin of 6.1 this morning and underwent transfusion with postop transfusion CBC pending.  Patient's son and daughter-in-law are present at bedside.  The patient is to be toe-touch weightbearing for 6 weeks given the spiral fracture  has slight displacement on PACU radiographs.    07/22/2024:  Postop day 2 from left retrograde intramedullary nail for a distal femur periprosthetic fracture. Patient underwent blood transfusion. No updated CBC as of yet. Nurse present at bedside and states lab is short staffed so no one has been up to draw her labs.PT/OT also has not worked with the patient as she was receiving blood transfusion on yesterday. Otherwise no other complaints.     07/23/2024: Postop day 3 from left retrograde intramedullary nail for a distal femur periprosthetic fracture. Patient reports she is doing much better. She states that she stood up with PT/OT yesterday but did not walk with the walker. PT/OT recommending SNF which is pending acceptance. Otherwise no other complaints.     Past Medical History:   Diagnosis Date    Age-related osteoporosis without current pathological fracture     Aspiration pneumonia (HCC) 03/27/2022    B12 deficiency 05/10/2011    Essential hypertension     Hypothyroidism (acquired) 09/26/2011    Influenza A (H1N1) 03/27/2022    Mixed hyperlipidemia     Nonrheumatic aortic valve stenosis 02/28/2019    Paroxysmal atrial fibrillation (HCC) 03/27/2022    Formatting of this note might be different from the original.   Formatting of this note might be different from the original.   Takes Eliquis and Lopressor  Formatting of this note might be different from the original.   Takes Eliquis and Lopressor    Spinal stenosis of lumbar region with neurogenic claudication 01/28/2015     Past Surgical History:   Procedure Laterality Date    CHOLECYSTECTOMY      HYSTERECTOMY (CERVIX STATUS UNKNOWN)      WRIST CLOSED REDUCTION Left 11/6/2023    LEFT WRIST CLOSED REDUCTION AND PINNING performed by Elijah Ramirez MD at [facility] MAIN OR      Family History   Problem Relation Age of Onset    No Known Problems Mother     No Known Problems Father      Social History     Tobacco Use    Smoking status: Never    Smokeless tobacco: Never    Musculoskeletal:  Positive for arthralgias (left leg), gait problem, joint swelling (left leg) and myalgias (left leg).   Skin:  Negative for wound.   Neurological:  Negative for numbness.   Psychiatric/Behavioral:  Negative for agitation.            Objective:     Current Facility-Administered Medications   Medication Dose Route Frequency    0.9 % sodium chloride infusion   IntraVENous PRN    celecoxib (CELEBREX) capsule 100 mg  100 mg Oral BID    0.9 % sodium chloride infusion   IntraVENous Continuous    atorvastatin (LIPITOR) tablet 40 mg  40 mg Oral Daily    calcium carbonate (TUMS) chewable tablet 500 mg  500 mg Oral BID    DULoxetine (CYMBALTA) extended release capsule 20 mg  20 mg Oral Daily    fentaNYL (DURAGESIC) 50 MCG/HR 1 patch  1 patch TransDERmal Q72H    gabapentin (NEURONTIN) capsule 100 mg  100 mg Oral TID    levothyroxine (SYNTHROID) tablet 50 mcg  50 mcg Oral Daily    metoprolol tartrate (LOPRESSOR) tablet 50 mg  50 mg Oral BID    pantoprazole (PROTONIX) tablet 20 mg  20 mg Oral Daily    rOPINIRole (REQUIP XL) extended release tablet 12 mg  12 mg Oral Daily    sodium chloride flush 0.9 % injection 5-40 mL  5-40 mL IntraVENous 2 times per day    sodium chloride flush 0.9 % injection 5-40 mL  5-40 mL IntraVENous PRN    0.9 % sodium chloride infusion   IntraVENous PRN    potassium chloride (KLOR-CON M) extended release tablet 40 mEq  40 mEq Oral PRN    Or    potassium bicarb-citric acid (EFFER-K) effervescent tablet 40 mEq  40 mEq Oral PRN    Or    potassium chloride 10 mEq/100 mL IVPB (Peripheral Line)  10 mEq IntraVENous PRN    magnesium sulfate 2000 mg in 50 mL IVPB premix  2,000 mg IntraVENous PRN    [Held by provider] enoxaparin (LOVENOX) injection 40 mg  40 mg SubCUTAneous Daily    ondansetron (ZOFRAN-ODT) disintegrating tablet 4 mg  4 mg Oral Q8H PRN    Or    ondansetron (ZOFRAN) injection 4 mg  4 mg IntraVENous Q6H PRN    polyethylene glycol (GLYCOLAX) packet 17 g  17 g Oral Daily

## 2024-07-23 NOTE — PLAN OF CARE
Problem: Discharge Planning  Goal: Discharge to home or other facility with appropriate resources  Outcome: Progressing  Flowsheets (Taken 7/22/2024 1945)  Discharge to home or other facility with appropriate resources:   Identify barriers to discharge with patient and caregiver   Arrange for needed discharge resources and transportation as appropriate     Problem: Pain  Goal: Verbalizes/displays adequate comfort level or baseline comfort level  Outcome: Progressing  Flowsheets (Taken 7/22/2024 1945)  Verbalizes/displays adequate comfort level or baseline comfort level:   Encourage patient to monitor pain and request assistance   Assess pain using appropriate pain scale   Administer analgesics based on type and severity of pain and evaluate response   Implement non-pharmacological measures as appropriate and evaluate response   Consider cultural and social influences on pain and pain management   Notify Licensed Independent Practitioner if interventions unsuccessful or patient reports new pain     Problem: Skin/Tissue Integrity  Goal: Absence of new skin breakdown  Description: 1.  Monitor for areas of redness and/or skin breakdown  2.  Assess vascular access sites hourly  3.  Every 4-6 hours minimum:  Change oxygen saturation probe site  4.  Every 4-6 hours:  If on nasal continuous positive airway pressure, respiratory therapy assess nares and determine need for appliance change or resting period.  Outcome: Progressing     Problem: Safety - Adult  Goal: Free from fall injury  Outcome: Progressing     Problem: ABCDS Injury Assessment  Goal: Absence of physical injury  Outcome: Progressing     Problem: Physical Therapy - Adult  Goal: By Discharge: Performs mobility at highest level of function for planned discharge setting.  See evaluation for individualized goals.  Description: FUNCTIONAL STATUS PRIOR TO ADMISSION: Patient was MIN A for ADLs and using a rollator for functional mobility.    HOME SUPPORT PRIOR TO  ADMISSION: Patient is from facility--[facility] and needs assist for bathing there.Patient reported she was able to amb.    Physical Therapy Goals  Initiated 7/22/2024  Pt stated goal: Want to go back to [facility]  Pt will be I with LE HEP in 7 days.  Pt will perform bed mobility with Minimal Assist in 7 days.  Pt will perform transfers with Minimal Assist in 7 days.   Pt will amb 20 feet with LRAD safely with Minimal Assist in 7 days.  Pt will verbalize and demonstrate compliance with fall/TTWB  precautions  in 7 days.   Pt will demonstrate improvement in standing/gait balance from poor to fair in 7 days.    7/22/2024 1556 by Bettie Ramirez, PT  Outcome: Progressing     Problem: Occupational Therapy - Adult  Goal: By Discharge: Performs self-care activities at highest level of function for planned discharge setting.  See evaluation for individualized goals.  Description: FUNCTIONAL STATUS PRIOR TO ADMISSION:  Prior to recent admission, pt was living at an [facility] in the memory care unit and required assistance with ADLs.    HOME SUPPORT: The patient lived in an [facility] with 24 hour care.    Occupational Therapy Goals:  Initiated 7/22/2024  Patient/Family stated goal: return to [facility]  1.  Patient will perform self-feeding with [place] within 7 day(s).  2.  Patient will perform grooming with [place] within 7 day(s).  3.  Patient will perform upper body dressing with [place] within 7 day(s).  4.  Patient will perform toilet transfers with [place]  within 7 day(s).  5.  Patient will perform all aspects of toileting with [place] within 7 day(s).  6.  Patient will participate in upper extremity therapeutic exercise/activities with [place] within 7 day(s).    7/22/2024 1559 by Melina Bello, OT  Outcome: Progressing

## 2024-07-23 NOTE — CARE COORDINATION
Inbound call from [facility] (admissions/[facility]) @ [phone]. Patient has been accepted for SNF.  No bed available until Thursday.  Admissions need to know if patient will d/c with the rae.        VIMAL Monte

## 2024-07-23 NOTE — PROGRESS NOTES
23.7 (L) 35.0 - 47.0 %    MCV 87.1 80.0 - 99.0 FL    MCH 28.7 26.0 - 34.0 PG    MCHC 32.9 30.0 - 36.5 g/dL    RDW 15.6 (H) 11.5 - 14.5 %    Platelets 175 150 - 400 K/uL    MPV 10.0 8.9 - 12.9 FL    Nucleated RBCs 0.0 0.0  WBC    nRBC 0.00 0.00 - 0.01 K/uL    Neutrophils % 89 (H) 32 - 75 %    Lymphocytes % 5 (L) 12 - 49 %    Monocytes % 4 (L) 5 - 13 %    Eosinophils % 1 0 - 7 %    Basophils % 0 0 - 1 %    Immature Granulocytes % 1 (H) 0 - 0.5 %    Neutrophils Absolute 15.3 (H) 1.8 - 8.0 K/UL    Lymphocytes Absolute 0.9 0.8 - 3.5 K/UL    Monocytes Absolute 0.7 0.0 - 1.0 K/UL    Eosinophils Absolute 0.2 0.0 - 0.4 K/UL    Basophils Absolute 0.0 0.0 - 0.1 K/UL    Immature Granulocytes Absolute 0.1 (H) 0.00 - 0.04 K/UL    Differential Type AUTOMATED     CBC    Collection Time: 07/22/24  2:51 PM   Result Value Ref Range    WBC 17.5 (H) 3.6 - 11.0 K/uL    RBC 2.71 (L) 3.80 - 5.20 M/uL    Hemoglobin 7.9 (L) 11.5 - 16.0 g/dL    Hematocrit 23.6 (L) 35.0 - 47.0 %    MCV 87.1 80.0 - 99.0 FL    MCH 29.2 26.0 - 34.0 PG    MCHC 33.5 30.0 - 36.5 g/dL    RDW 15.7 (H) 11.5 - 14.5 %    Platelets 196 150 - 400 K/uL    MPV 10.3 8.9 - 12.9 FL    Nucleated RBCs 0.1 (H) 0.0  WBC    nRBC 0.02 (H) 0.00 - 0.01 K/uL   Basic Metabolic Panel    Collection Time: 07/22/24  2:51 PM   Result Value Ref Range    Sodium 139 136 - 145 mmol/L    Potassium 3.7 3.5 - 5.1 mmol/L    Chloride 106 97 - 108 mmol/L    CO2 28 21 - 32 mmol/L    Anion Gap 5 5 - 15 mmol/L    Glucose 121 (H) 65 - 100 mg/dL    BUN 22 (H) 6 - 20 mg/dL    Creatinine 0.68 0.55 - 1.02 mg/dL    BUN/Creatinine Ratio 32 (H) 12 - 20      Est, Glom Filt Rate 84 >60 ml/min/1.73m2    Calcium 8.2 (L) 8.5 - 10.1 mg/dL       XR FEMUR LEFT (MIN 2 VIEWS)   Final Result   Significant improvement in left distal femoral spiral fracture   alignment with dilma and screws in place.      Electronically signed by Erum BERRIOS LESS THAN 1 HOUR   Final Result      XR FEMUR LEFT (MIN 2 VIEWS)  stable  Hypokalemia-resolved  Acute blood loss anemia-status posttransfusion, hemoglobin 7.9 yesterday afternoon  Leukocytosis-WBC up to 17, currently there is no evidence of infection the patient is afebrile, this may possibly be reactive  Essential hypertension-stable  Hypothyroidism  Mixed hyperlipidemia  Paroxysmal atrial fibrillation-she is currently in RSR.  Review of the record shows that she is not on anticoagulation I am not sure why  Aortic stenosis no issues        Plan:  Continue postop care as per orthopedics  Waiting on daily blood work to assess hemoglobin hematocrit  Continue IV iron getting second dose today  Monitor daily H&H  Will order UA with reflex due to leukocytosis and will recheck CBC tomorrow  Continue pain medicine as needed  PT and OT, patient will require DC to SNF  Discussed care plan with nurse        Code status: Full code    Social determinants of health: none      Estimated discharge date//time frame/disposition: 2 days SNF    Barriers to discharge: Clinical improvement          Paresh Samaniego MD

## 2024-07-23 NOTE — PROGRESS NOTES
End of Shift Note      Shift worked:  [id number]     Shift summary and any significant changes:     Skin break down noted to pt's left gluteal fold and coccyx. Wound care consult placed. Interventions in place to include Q2 turns,barrier cream.  CCL notified to findings.    Urine sample collected via straight cath. Sent to lab and now awaiting results.  Laced    Dietician consult placed for poor intake.         Concerns for physician to address: D/c metop XL? Low bp trend towards the end of the day.     Zone phone for oncoming shift:              Marine Reddy RN

## 2024-07-24 LAB
ANION GAP SERPL CALC-SCNC: 9 MMOL/L (ref 5–15)
BUN SERPL-MCNC: 21 MG/DL (ref 6–20)
BUN/CREAT SERPL: 36 (ref 12–20)
CA-I BLD-MCNC: 8.8 MG/DL (ref 8.5–10.1)
CHLORIDE SERPL-SCNC: 110 MMOL/L (ref 97–108)
CO2 SERPL-SCNC: 22 MMOL/L (ref 21–32)
CREAT SERPL-MCNC: 0.58 MG/DL (ref 0.55–1.02)
ERYTHROCYTE [DISTWIDTH] IN BLOOD BY AUTOMATED COUNT: 15.9 % (ref 11.5–14.5)
GLUCOSE SERPL-MCNC: 85 MG/DL (ref 65–100)
HCT VFR BLD AUTO: 23.5 % (ref 35–47)
HGB BLD-MCNC: 7.6 G/DL (ref 11.5–16)
MCH RBC QN AUTO: 28.7 PG (ref 26–34)
MCHC RBC AUTO-ENTMCNC: 32.3 G/DL (ref 30–36.5)
MCV RBC AUTO: 88.7 FL (ref 80–99)
NRBC # BLD: 0.08 K/UL (ref 0–0.01)
NRBC BLD-RTO: 0.5 PER 100 WBC
PLATELET # BLD AUTO: 306 K/UL (ref 150–400)
PMV BLD AUTO: 9.5 FL (ref 8.9–12.9)
POTASSIUM SERPL-SCNC: 3.5 MMOL/L (ref 3.5–5.1)
RBC # BLD AUTO: 2.65 M/UL (ref 3.8–5.2)
SODIUM SERPL-SCNC: 141 MMOL/L (ref 136–145)
WBC # BLD AUTO: 17 K/UL (ref 3.6–11)

## 2024-07-24 PROCEDURE — 80048 BASIC METABOLIC PNL TOTAL CA: CPT

## 2024-07-24 PROCEDURE — 6370000000 HC RX 637 (ALT 250 FOR IP): Performed by: ORTHOPAEDIC SURGERY

## 2024-07-24 PROCEDURE — 6360000002 HC RX W HCPCS: Performed by: HOSPITALIST

## 2024-07-24 PROCEDURE — 94761 N-INVAS EAR/PLS OXIMETRY MLT: CPT

## 2024-07-24 PROCEDURE — 92610 EVALUATE SWALLOWING FUNCTION: CPT

## 2024-07-24 PROCEDURE — 6370000000 HC RX 637 (ALT 250 FOR IP): Performed by: HOSPITALIST

## 2024-07-24 PROCEDURE — 97530 THERAPEUTIC ACTIVITIES: CPT

## 2024-07-24 PROCEDURE — 85027 COMPLETE CBC AUTOMATED: CPT

## 2024-07-24 PROCEDURE — 99024 POSTOP FOLLOW-UP VISIT: CPT

## 2024-07-24 PROCEDURE — 2580000003 HC RX 258: Performed by: HOSPITALIST

## 2024-07-24 PROCEDURE — 1100000000 HC RM PRIVATE

## 2024-07-24 PROCEDURE — 36415 COLL VENOUS BLD VENIPUNCTURE: CPT

## 2024-07-24 RX ORDER — ENOXAPARIN SODIUM 100 MG/ML
40 INJECTION SUBCUTANEOUS DAILY
Status: DISCONTINUED | OUTPATIENT
Start: 2024-07-24 | End: 2024-07-30 | Stop reason: HOSPADM

## 2024-07-24 RX ADMIN — GABAPENTIN 100 MG: 100 CAPSULE ORAL at 20:49

## 2024-07-24 RX ADMIN — CELECOXIB 100 MG: 100 CAPSULE ORAL at 10:27

## 2024-07-24 RX ADMIN — ACETAMINOPHEN 1000 MG: 500 TABLET ORAL at 20:50

## 2024-07-24 RX ADMIN — GABAPENTIN 100 MG: 100 CAPSULE ORAL at 10:27

## 2024-07-24 RX ADMIN — ATORVASTATIN CALCIUM 40 MG: 40 TABLET, FILM COATED ORAL at 10:27

## 2024-07-24 RX ADMIN — ACETAMINOPHEN 1000 MG: 500 TABLET ORAL at 10:27

## 2024-07-24 RX ADMIN — PANTOPRAZOLE SODIUM 20 MG: 20 TABLET, DELAYED RELEASE ORAL at 10:27

## 2024-07-24 RX ADMIN — DULOXETINE HYDROCHLORIDE 20 MG: 20 CAPSULE, DELAYED RELEASE ORAL at 10:27

## 2024-07-24 RX ADMIN — CALCIUM CARBONATE 500 MG: 500 TABLET, CHEWABLE ORAL at 10:27

## 2024-07-24 RX ADMIN — SODIUM CHLORIDE: 9 INJECTION, SOLUTION INTRAVENOUS at 17:36

## 2024-07-24 RX ADMIN — GABAPENTIN 100 MG: 100 CAPSULE ORAL at 15:44

## 2024-07-24 RX ADMIN — ACETAMINOPHEN 1000 MG: 500 TABLET ORAL at 15:43

## 2024-07-24 RX ADMIN — CALCIUM CARBONATE 500 MG: 500 TABLET, CHEWABLE ORAL at 20:50

## 2024-07-24 RX ADMIN — ENOXAPARIN SODIUM 40 MG: 100 INJECTION SUBCUTANEOUS at 17:46

## 2024-07-24 RX ADMIN — CELECOXIB 100 MG: 100 CAPSULE ORAL at 20:49

## 2024-07-24 RX ADMIN — LEVOTHYROXINE SODIUM 50 MCG: 0.03 TABLET ORAL at 06:42

## 2024-07-24 RX ADMIN — POLYETHYLENE GLYCOL 3350 17 G: 17 POWDER, FOR SOLUTION ORAL at 10:27

## 2024-07-24 RX ADMIN — ROPINIROLE 12 MG: 2 TABLET, FILM COATED, EXTENDED RELEASE ORAL at 10:26

## 2024-07-24 RX ADMIN — SODIUM CHLORIDE, PRESERVATIVE FREE 10 ML: 5 INJECTION INTRAVENOUS at 10:28

## 2024-07-24 NOTE — PROGRESS NOTES
OCCUPATIONAL THERAPY TREATMENT  Patient: Jericho Gómez (89 y.o. 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]  Closed fracture of femur, unspecified fracture morphology, unspecified laterality, unspecified portion of femur, initial encounter ([facility]) [S72.90XA]  Procedure(s) (LRB):  FEMUR INTRAMEDULLARY NAIL RETROGRADE (Left) 4 Days Post-Op   Precautions: General Precautions, Weight Bearing   Left Lower Extremity Weight Bearing: Toe Touch Weight Bearing            Recommendations for nursing mobility: Out of bed to chair for meals, Encourage HEP in prep for ADLs/mobility; see handout for details, Frequent repositioning to prevent skin breakdown, Use of bed/chair alarm for safety, and Assist x2    In place during session: Peripheral IV and External Catheter  Chart, occupational therapy assessment, plan of care, and goals were reviewed.  ASSESSMENT  Patient continues with skilled OT services and is slowly progressing towards goals. Pt presented in recliner upon GUTIERREZ arrival, agreeable to session. Pt A&O x 4. Patient participated in B UE therex requiring demonstration and verbal cues for technique and to get full ROM.  Pt demonstrated difficulty following verbal and visual cues during exercises. Pt required frequent rest breaks.  Pt came in and pt was transferred back to bed.  Pt was max x2 to stand, took a few steps toward bed than bed had to be brought up behind pt.  Pt was left semi supine in bed with call bell.. (See below for objective details and assist levels).     Overall pt tolerated session fair today with poor activity tolerance.  Potential barriers for safe discharge: pt lives alone, pts current support system is unable to meet their requirements for physical assistance, pt has poor safety awareness, pt has impaired cognition, pt is a high fall risk, pt is not safe to be alone, concern for pt safely  SUMMARY:   Cognitive/Behavioral Status:  Orientation  Orientation Level: Oriented X4  Cognition  Overall Cognitive Status: Exceptions  Following Commands: Follows one step commands with increased time;Follows one step commands with repetition  Attention Span: Attends with cues to redirect  Problem Solving: Assistance required to implement solutions;Assistance required to identify errors made;Decreased awareness of errors;Assistance required to generate solutions;Assistance required to correct errors made  Initiation: Requires cues for some  Sequencing: Requires cues for some    Functional Mobility and Transfers for ADLs:  Bed Mobility:  Bed Mobility Training  Bed Mobility Training: Yes  Overall Level of Assistance: Moderate assistance;Maximum assistance;Additional time;Assist X1  Interventions: Verbal cues;Demonstration;Manual cues;Safety awareness training;Tactile cues;Weight shifting training/pressure relief  Rolling: Moderate assistance;Additional time;Maximum assistance;Assist X1  Supine to Sit: Moderate assistance;Additional time;Assist X1;Minimum assistance  Sit to Supine: Moderate assistance;Assist X2;Additional time  Scooting: Maximum assistance;Additional time;Assist X2    Transfers:  Transfer Training  Transfer Training: Yes  Overall Level of Assistance: Moderate assistance;Maximum assistance;Assist X2;Additional time  Interventions: Demonstration;Verbal cues;Manual cues;Safety awareness training;Tactile cues  Sit to Stand: Maximum assistance;Additional time;Assist X2  Stand to Sit: Maximum assistance;Additional time;Assist X2  Toilet Transfer: Moderate assistance;Maximum assistance;Assist X1;Assist X2      Balance:  Balance  Sitting: Intact  Standing: Impaired  Standing - Static: Constant support;Poor  Standing - Dynamic: Poor;Constant support        Therapeutic exercise:  Completed in recliner  Exercise Sets Reps AROM AAROM PROM Self PROM Comments   Shoulder flex/ext 2 5 [x] [] [] []    Elbow flex/ext 2  5 [x] [] [] []    Chest presses 2 5 [x] [] [] []    Chair push ups 2 5 [x] [] [] []      Pain Ratin/10 L hip and buttocks while sitting in recliner  Pain Intervention(s):   repositioning    Activity Tolerance:   Poor and requires frequent rest breaks    After treatment patient left in no apparent distress:   Bed locked and returned to lowest position, Patient left in no apparent distress in bed, Call bell within reach, Bed/ chair alarm activated, Side rails x3, and Updated patient's board on functional status and mobility recommendations, and nsg updated     COMMUNICATION/EDUCATION:   The patient’s plan of care was discussed with: Physical therapist and Registered nurse  PT/OT sessions occurred together for increased safety of pt and clinician.    Patient Education  Education Given To: Patient  Education Provided: Plan of Care;Home Exercise Program  Education Method: Verbal;Demonstration;Teach Back  Barriers to Learning: Cognition  Education Outcome: Unable to demonstrate understanding;Unable to verbalize;Continued education needed    Thank you for this referral.  WISAM Fisher  Minutes: 39

## 2024-07-24 NOTE — PLAN OF CARE
PHYSICAL THERAPY TREATMENT     Patient: Jericho Gómez (89 y.o. 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  ASSESSMENT  Patient continues with skilled PT services and is progressing towards goals. Pt semi supine upon PT arrival, agreeable to session. Pt A&O x 4. (See below for objective details and assist levels).     Overall pt tolerated session fair today with PT.Patient required less assist to get oob today,Performed LE exs sitting at eob,required raising the bed height a little to get up with therapy.after standing,PT required assist from nursing to stand by as patient starting to get panic with fear of fall.Patient required extended period time for all mobility, due to requiring assurance,rest breaks and slow mobility.Patient took few steps but unable to maintain TTWB LLE.also exhibits pain scream with WB and advancing the RLE.AX2 required for bed to chair transfers.Patient was left in recliner  Will continue to benefit from skilled PT services, and will continue to progress as tolerated. Potential barriers for safe  Call bell within reach, and nsg updated     COMMUNICATION/COLLABORATION:   The patient’s plan of care was discussed with: Occupational therapy assistant, Registered nurse, and Case management    Patient Education  Education Given To: Patient  Education Provided: Role of Therapy;Plan of Care;Orientation;Fall Prevention Strategies;Home Exercise Program;Precautions;Transfer Training;Energy Conservation  Education Method: Demonstration;Verbal;Teach Back  Education Outcome: Verbalized understanding;Demonstrated understanding        Bettie Ramirez, PT  Minutes: 39

## 2024-07-24 NOTE — PLAN OF CARE
Speech LAnguage Pathology Dysphagia EVALUATION    Patient: Jericho Gómez (89 y.o. 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]  Closed fracture of femur, unspecified fracture morphology, unspecified laterality, unspecified portion of femur, initial encounter ([facility]) [S72.90XA]  Procedure(s) (LRB):  FEMUR INTRAMEDULLARY NAIL RETROGRADE (Left) 4 Days Post-Op   Precautions: aspiration General Precautions, Weight Bearing   Left Lower Extremity Weight Bearing: Toe Touch Weight Bearing              Time In: 1415  Time Out: 1431    DIET RECOMMENDATIONS: Easy to chew and thin liquids    SWALLOW SAFETY PRECAUTIONS: 1:1 assistance with ALL PO intake, STRICT aspiration and GERD precautions, monitor pt closely for s/s aspiration, meds as tolerated, FEED ONLY IF AWAKE AND ALERT.      ASSESSMENT :  Based on the objective data described below, the patient presents with minimal oropharyngeal dysphagia c/b generalized weakness.   Patient s/p surgery for femur fx day 4. Rn reports patient was coughing during intake of thin liquids and medications this am. Per records review, patient has aspiration PNA in her history.   Patient seen at bedside. She reports no appetite and a cough that started last pm w/o oral intake.   Oral motor c/b slow range of motion and decreased strength. Movements appear effortful.   Administered thin liquids via cup and straw. Patient w/ timely swallow initiation, pharyngeal response adequate to digital palpation. No clinical indicators of penetration or aspiration. Mastication prolonged and effortful w/ slow A-P transit. Liquid wash administered to clear oral residue. Patient w/ x 1 delayed cough response.  Recent chest XR 7/18 clear for infiltrate. WBC 17.0 and slightly trending downward.   Recommend easy to chew diet, thin liquids w/ STRICT aspiration precautions. Will continue to monitor safety of oral  intake and aspiration s/sx.   MBS as indicated.     Patient will benefit from skilled intervention to address the above impairments.    GOALS:    Problem: SLP Adult - Impaired Swallowing  Goal: By Discharge: Advance to least restrictive diet without signs or symptoms of aspiration for planned discharge setting.  See evaluation for individualized goals.  Description: Speech Therapy Swallow Goals  Initiated 7/24/24  -Patient will tolerate ETC diet with thin liquids without clinical indicators of aspiration given min cues within 7 day(s).        -Patient will tolerate PO trials without clinical indicators of aspiration given min cues within 7 day(s).      -Patient will participate in modified barium swallow study within 7 day(s).      -Patient will demonstrate understanding of swallow safety precautions and aspiration precautions, diet recs with min cues within 7day(s).    -Patient/caregiver goal: To go home   7/24/2024 1505 by Carito Briggs, SLP  Outcome: Progressing  7/24/2024 1504 by Carito Briggs, SLP  Outcome: Progressing          PLAN :  Recommendations and Planned Interventions:  Diet: Easy to chew and thin liquids  Strict aspiration precautions.   MBS as indicated.   Use of small single sips, slow rate.      Acute SLP Services: Yes, patient will be followed by speech-language pathology 3x/week to address goals. Patient's rehabilitation potential is considered to be Fair.    Discharge Recommendations: To Be Determined       Patient seen at bedside    OBJECTIVE:     Past Medical History:   Diagnosis Date    Age-related osteoporosis without current pathological fracture     Aspiration pneumonia (HCC) 03/27/2022    B12 deficiency 05/10/2011    Essential hypertension     Hypothyroidism (acquired) 09/26/2011    Influenza A (H1N1) 03/27/2022    Mixed hyperlipidemia     Nonrheumatic aortic valve stenosis 02/28/2019    Paroxysmal atrial fibrillation (HCC) 03/27/2022    Formatting of this note might be

## 2024-07-24 NOTE — CONSULTS
Comprehensive Nutrition Assessment    Type and Reason for Visit:  Initial, Consult    Nutrition Recommendations/Plan:   Continue Current Diet  Encourage adequate PO intake   Initiate ONS 2x/day and Jordan 1x/day   Please obtain current body weight   Monitor PO intake, ONS intake/tolerance and BM in I/o's     Malnutrition Assessment:  Malnutrition Status:  Mild malnutrition (07/24/24 1445)    Context:  Acute Illness     Findings of the 6 clinical characteristics of malnutrition:  Energy Intake:  50% or less of estimated energy requirements for 5 or more days  Weight Loss:  Unable to assess     Body Fat Loss:  Unable to assess     Muscle Mass Loss:  Unable to assess    Fluid Accumulation:  Unable to assess     Strength:  Not Performed    Nutrition Assessment:    RD consulted for decreased intake and wounds. Spoke w RN via phone who reports pt refused breakfast and only had OJ for lunch. Plan to initiate ONS and Jordan to address decreased intake and wounds. Meds include atorvastatin, calcium carbonate, pantoprazole, polyethylene glycol. Abnormal labs include Ch 110, BUN 21, Hgb 7.6, Hct 23.5.    Nutrition Related Findings:    NFPE deferred, RD to try again at f/u. RN reports, pt coughing and weezing when drinking liquids. SLP was consulted due to hx of aspiration. Current diet, easy to chew w strict aspiration protocol. LBM 7/23. Wound Type: Multiple, Surgical Incision       Current Nutrition Intake & Therapies:    Average Meal Intake: 0%  Average Supplements Intake: None Ordered  ADULT DIET; Easy to Chew    Anthropometric Measures:  Height: 167.6 cm (5' 6\")  Ideal Body Weight (IBW): 130 lbs (59 kg)       Current Body Weight: 59 kg (130 lb 1.1 oz),   IBW.    Current BMI (kg/m2): 21                          BMI Categories: Underweight (BMI less than 22) age over 65    Estimated Daily Nutrient Needs:  Energy Requirements Based On: Kcal/kg     Energy (kcal/day): 1800 -2100 kcal ( 30-35 kcal/CBW)  Weight Used for  Protein Requirements: Current  Protein (g/day): 71 g ( 1.2 g /CBW)  Method Used for Fluid Requirements: 1 ml/kcal  Fluid (ml/day): 1800 - 2100 ml/kcal    Nutrition Diagnosis:   Inadequate protein-energy intake related to acute injury/trauma as evidenced by wounds, BMI, intake 0-25%    Nutrition Interventions:   Food and/or Nutrient Delivery: Continue Current Diet  Nutrition Education/Counseling: No recommendation at this time  Coordination of Nutrition Care: Continue to monitor while inpatient  Plan of Care discussed with: RN    Goals:     Goals: PO intake 50% or greater, by next RD assessment       Nutrition Monitoring and Evaluation:   Behavioral-Environmental Outcomes: None Identified  Food/Nutrient Intake Outcomes: Food and Nutrient Intake, Supplement Intake  Physical Signs/Symptoms Outcomes: Weight, Nutrition Focused Physical Findings, Meal Time Behavior    Discharge Planning:    Too soon to determine     Chary Venegas RD  Contact: [phone] or Karoline

## 2024-07-24 NOTE — PROGRESS NOTES
.Progress Note (Hospitalist, Internal Medicine)  IDENTIFYING INFORMATION   PATIENT:  Jericho Gómez  MRN:  [id number]  ADMIT DATE: 7/18/2024  TIME OF EVALUATION: 7/24/2024 5:16 PM      HISTORY OF PRESENT ILLNESS   Jericho Gómez is a 89 y.o. female who presents with       SUBJECTIVE     Comfortably in bed.  Hemoglobin low but stable.  White cell count persistently elevated.  She has remained afebrile.    MEDICATIONS   Medications Prior to Admission  Medications Prior to Admission: potassium chloride (KLOR-CON) 10 MEQ extended release tablet,   DULoxetine (CYMBALTA) 20 MG extended release capsule, Take 1 capsule by mouth daily  fentaNYL (DURAGESIC) 50 MCG/HR, Place 1 patch onto the skin every 72 hours.  gabapentin (NEURONTIN) 100 MG capsule, Take 1 capsule by mouth 3 times daily.  atorvastatin (LIPITOR) 40 MG tablet, Take 1 tablet by mouth daily  levothyroxine (SYNTHROID) 50 MCG tablet, Take 1 tablet by mouth Daily  hydroCHLOROthiazide (MICROZIDE) 12.5 MG capsule, Take 1 capsule by mouth every morning  meloxicam (MOBIC) 15 MG tablet, Take 1 tablet by mouth daily  metoprolol tartrate (LOPRESSOR) 50 MG tablet, Take 1 tablet by mouth 2 times daily  pantoprazole (PROTONIX) 20 MG tablet, Take 1 tablet by mouth daily  rOPINIRole (REQUIP XL) 12 MG TB24 extended release tablet, Take 1 tablet by mouth daily  furosemide (LASIX) 20 MG tablet, Take 0.5 tablets by mouth daily  vitamin B-12 (CYANOCOBALAMIN) 100 MCG tablet, Take 0.5 tablets by mouth daily  calcium carbonate (OSCAL) 500 MG TABS tablet, Take 1 tablet by mouth daily    Current Medications  Current Facility-Administered Medications   Medication Dose Route Frequency Provider Last Rate Last Admin    enoxaparin (LOVENOX) injection 40 mg  40 mg SubCUTAneous Daily Wiliam Pichardo MD        0.9 % sodium chloride infusion   IntraVENous PRN Paresh Samaniego MD        celecoxib (CELEBREX) capsule 100 mg  100 mg Oral BID Beto Martini MD   100 mg at 07/24/24 7658  injection 4 mg  4 mg IntraVENous Q6H PRN Bhanu Haque DO   4 mg at 07/23/24 0921    polyethylene glycol (GLYCOLAX) packet 17 g  17 g Oral Daily Bhanu Haque DO   17 g at 07/24/24 1027    acetaminophen (TYLENOL) tablet 1,000 mg  1,000 mg Oral TID Bhanu Haque DO   1,000 mg at 07/24/24 1543         Allergies  No Known Allergies    REVIEW OF SYSTEMS     Within above limitations. 14 point review of systems reviewed. Pertinent positive or negative as per HPI or otherwise negative per 14 point systems review.     Reviewed 7/24/2024 at 5:16 PM    PHYSICAL EXAM       Blood pressure (!) 111/57, pulse 75, temperature 98.1 °F (36.7 °C), resp. rate 16, height 1.676 m (5' 6\"), weight 59 kg (130 lb), SpO2 90 %.    General: Patient is awake, alert, oriented with appropriate mood and affect  HEENT: Atraumatic, normocephalic, pupils equal equally reactive to light.  Mucous membranes moist  Heart: First and second heart sounds present  Lungs: Air entry adequate  Abdomen: Soft, nontender, nondistended.  Bowel sounds present with regular frequency  Genitals: Deferred  Skin:  Extremities:  pedal pulses palpable with good volume.  CNS: Cranial nerves II to XII intact with no focal neurological deficit.      Lines/Drains/Airways/Wounds:  [unfilled]    LABS AND IMAGING   CBC  [unfilled]    Last 3 Hemoglobin  Lab Results   Component Value Date/Time    HGB 7.6 07/24/2024 08:36 AM    HGB 7.4 07/23/2024 10:20 AM    HGB 7.9 07/22/2024 02:51 PM     Last 3 WBC/ANC  Lab Results   Component Value Date/Time    WBC 17.0 07/24/2024 08:36 AM    WBC 17.2 07/23/2024 10:20 AM    WBC 17.5 07/22/2024 02:51 PM     No components found for: \"GRNLOCTYABS\"  Last 3 Platelets  No results found for: \"PLATELET\"  Chemistry  [unfilled]  [unfilled]  No results found for: \"LDH\"  Coagulation Studies  Lab Results   Component Value Date/Time    INR 1.0 07/18/2024 09:18 PM     Liver Function Studies  Lab Results   Component Value Date/Time    ALT 19  07/19/2024 06:35 AM    AST 25 07/19/2024 06:35 AM    ALKPHOS 72 07/19/2024 06:35 AM    ALBUMIN 3.0 07/19/2024 06:35 AM       Recent Imaging        Relevant labs and imaging reviewed    ASSESSMENT AND PLAN     Assessment:    Subtrochanteric fracture left femur  Status post surgical repair, patient is stable  Pending discharge to SNF    Hypokalemia  Resolved.  Supplement and monitor    Acute blood loss anemia  S/p hemotransfusion.  Continue to monitor H&H.    Leukocytosis  WBC up to 17, currently there is no evidence of infection the patient is afebrile, this may possibly be reactive.  WBC has remained elevated    Essential hypertension  Continue current antihypertensive regimen    Hypothyroidism    Mixed hyperlipidemia  Paroxysmal atrial fibrillation-she is currently in RSR.  Review of the record shows that she is not on anticoagulation I am not sure why, probably from the risk from her fall    Aortic stenosis no issues          [facility]  Hospitalist, Internal Medicine  7/24/2024 at 5:16 PM

## 2024-07-24 NOTE — PROGRESS NOTES
ORTHOPEDIC PROGRESS NOTE    Patient: Jericho Gómez MRN: [id number]  SSN: [id number]    YOB: 1935  Age: 89 y.o.  Sex: female      Subjective:      Jericho Gómez is a 89 y.o. female who is being seen for left distal femur fracture following a fall. Patient seen at bedside with family present. They report the patient went out to the [facility] yesterday and fell while going up the stairs injuring her left leg. The family states she has had several falls in the past, two falls which resulted in wrist fractures one of which underwent surgical fixation by Dr. Ramirez in [place], [place]. The patient is from [place] and receives most of her care there. However, following the fall yesterday the family wanted the patient to go to [facility], in hopes that Dr. Ramirez could do surgery for her mother as he fixed her wrist during a previous fall. While at [facility] ED X-rays were obtained and revealed a distal femoral fracture. It was recommended by Dr. Ramirez the patient be sent to [facility] for a higher level of care. Patient's family wanted the patient to be transported to [place], [place] but unfortunately were unable to. Orthopedics here consulted for further evaluation and management.     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 Family states patient lives alone and is independent with her ADLs. She rarely uses a walker but has one at home.    Denies N/V, fever, chills, chest pain, abdominal pain, numbness/tingling, or pain elsewhere other than the left leg.     7/21/2024: Postop day 1 from left retrograde intramedullary nail for a distal femur periprosthetic fracture.  Patient had a hemoglobin of 6.1 this morning and underwent transfusion with postop transfusion CBC pending.  Patient's son and daughter-in-law are present at bedside.  The patient is to be toe-touch weightbearing for 6 weeks given the spiral fracture

## 2024-07-24 NOTE — PLAN OF CARE
Problem: Discharge Planning  Goal: Discharge to home or other facility with appropriate resources  Outcome: Progressing  Flowsheets (Taken 7/23/2024 1945)  Discharge to home or other facility with appropriate resources: Identify barriers to discharge with patient and caregiver     Problem: Pain  Goal: Verbalizes/displays adequate comfort level or baseline comfort level  Outcome: Progressing  Flowsheets (Taken 7/23/2024 1945)  Verbalizes/displays adequate comfort level or baseline comfort level:   Encourage patient to monitor pain and request assistance   Assess pain using appropriate pain scale   Administer analgesics based on type and severity of pain and evaluate response     Problem: Skin/Tissue Integrity  Goal: Absence of new skin breakdown  Description: 1.  Monitor for areas of redness and/or skin breakdown  2.  Assess vascular access sites hourly  3.  Every 4-6 hours minimum:  Change oxygen saturation probe site  4.  Every 4-6 hours:  If on nasal continuous positive airway pressure, respiratory therapy assess nares and determine need for appliance change or resting period.  Outcome: Progressing     Problem: Safety - Adult  Goal: Free from fall injury  Outcome: Progressing  Flowsheets (Taken 7/23/2024 1955)  Free From Fall Injury: Instruct family/caregiver on patient safety     Problem: ABCDS Injury Assessment  Goal: Absence of physical injury  Outcome: Progressing  Flowsheets (Taken 7/23/2024 1955)  Absence of Physical Injury: Implement safety measures based on patient assessment     Problem: Confusion  Goal: Confusion, delirium, dementia, or psychosis is improved or at baseline  Description: INTERVENTIONS:  1. Assess for possible contributors to thought disturbance, including medications, impaired vision or hearing, underlying metabolic abnormalities, dehydration, psychiatric diagnoses, and notify attending LIP  2. Silver high risk fall precautions, as indicated  3. Provide frequent short contacts to

## 2024-07-24 NOTE — PROGRESS NOTES
4 Eyes Skin Assessment     NAME:  Jericho Gómez  YOB: 1935  MEDICAL RECORD NUMBER:  [id number]    The patient is being assessed for  Other WEEKLY    I agree that at least one RN has performed a thorough Head to Toe Skin Assessment on the patient. ALL assessment sites listed below have been assessed.      Areas assessed by both nurses:    Head, Face, Ears, Shoulders, Back, Chest, Arms, Elbows, Hands, Sacrum. Buttock, Coccyx, Ischium, Legs. Feet and Heels, and Under Medical Devices         Does the Patient have a Wound? Yes wound(s) were present on assessment. LDA wound assessment was Initiated and completed by RN       [place] Prevention initiated by RN: Yes  Wound Care Orders initiated by RN: No orders previously placed    Pressure Injury (Stage 3,4, Unstageable, DTI, NWPT, and Complex wounds) if present, place Wound referral order by RN under : No    New Ostomies, if present place, Ostomy referral order under : No     Nurse 1 eSignature: Electronically signed by Gill Colon RN on 7/24/24 at 5:54 AM EDT    **SHARE this note so that the co-signing nurse can place an eSignature**    Nurse 2 eSignature: Electronically signed by Hannah Frank RN on 7/24/24 at 6:23 AM EDT    -3

## 2024-07-24 NOTE — WOUND CARE
IP WOUND CONSULT    Jericho Gómez  MEDICAL RECORD NUMBER:  [id number]  AGE: 89 y.o.   GENDER: female  : 1935  TODAY'S DATE:  2024    GENERAL     [] Follow-up   [x] New Consult    Jericho Gómez is a 89 y.o. female referred by:   [x] Physician  [] Nursing  [] Other:         PAST MEDICAL HISTORY    Past Medical History:   Diagnosis Date    Age-related osteoporosis without current pathological fracture     Aspiration pneumonia (HCC) 2022    B12 deficiency 05/10/2011    Essential hypertension     Hypothyroidism (acquired) 2011    Influenza A (H1N1) 2022    Mixed hyperlipidemia     Nonrheumatic aortic valve stenosis 2019    Paroxysmal atrial fibrillation (HCC) 2022    Formatting of this note might be different from the original.   Formatting of this note might be different from the original.   Takes Eliquis and Lopressor  Formatting of this note might be different from the original.   Takes Eliquis and Lopressor    Spinal stenosis of lumbar region with neurogenic claudication 2015        PAST SURGICAL HISTORY    Past Surgical History:   Procedure Laterality Date    CHOLECYSTECTOMY      HYSTERECTOMY (CERVIX STATUS UNKNOWN)      WRIST CLOSED REDUCTION Left 2023    LEFT WRIST CLOSED REDUCTION AND PINNING performed by Elijah Ramirez MD at [facility] MAIN OR       FAMILY HISTORY    Family History   Problem Relation Age of Onset    No Known Problems Mother     No Known Problems Father          ALLERGIES    No Known Allergies    MEDICATIONS    No current facility-administered medications on file prior to encounter.     Current Outpatient Medications on File Prior to Encounter   Medication Sig Dispense Refill    potassium chloride (KLOR-CON) 10 MEQ extended release tablet       DULoxetine (CYMBALTA) 20 MG extended release capsule Take 1 capsule by mouth daily      fentaNYL (DURAGESIC) 50 MCG/HR Place 1 patch onto the skin every 72 hours.      gabapentin (NEURONTIN) 100

## 2024-07-24 NOTE — PLAN OF CARE
PHYSICAL THERAPY TREATMENT     Patient: Jericho Gómez (89 y.o. 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  ASSESSMENT  Patient continues with skilled PT services and is slowly progressing towards goals. Pt sitting up in the chair  upon PT arrival, agreeable to session. Pt A&O x 4. (See below for objective details and assist levels).     Overall pt tolerated session fair to poor today with therapy.  Will continue to benefit from skilled PT services, and will continue to progress as tolerated. Potential barriers for safe discharge: pt has poor safety awareness, pt has impaired cognition, pt is a high fall risk, pt is not safe to be alone, and pt has new weight bearing restrictions limiting activity or patient is unable to maintain. Current PT DC recommendation Skilled Nursing Facility once medically appropriate.  GOALS:    Problem: Physical Therapy - Adult  Goal: By Discharge: Performs mobility at highest level of function for planned discharge setting.  See evaluation for individualized  goals.  Description: FUNCTIONAL STATUS PRIOR TO ADMISSION: Patient was MIN A for ADLs and using a rollator for functional mobility.    HOME SUPPORT PRIOR TO ADMISSION: Patient is from facility--[facility] and needs assist for bathing there.Patient reported she was able to amb.    Physical Therapy Goals  Initiated 7/22/2024  Pt stated goal: Want to go back to half-way  Pt will be I with LE HEP in 7 days.  Pt will perform bed mobility with Minimal Assist in 7 days.  Pt will perform transfers with Minimal Assist in 7 days.   Pt will amb 20 feet with LRAD safely with Minimal Assist in 7 days.  Pt will verbalize and demonstrate compliance with fall/TTWB  precautions  in 7 days.   Pt will demonstrate improvement in standing/gait balance from poor to fair in 7 days.    7/24/2024 1447 by Bettie Ramirez, PT  Outcome: Progressing          PLAN :  Patient continues to benefit from skilled intervention to address functional impairments. Continue treatment per established plan of care to address goals.    Recommendation for discharge: (in order for the patient to meet his/her long term goals)  Skilled Nursing Facility    IF patient discharges home will need the following DME:continuing to assess with progress     SUBJECTIVE:   Patient stated “I am ok.”    OBJECTIVE DATA SUMMARY:   Critical Behavior:  Orientation  Orientation Level: Oriented to place;Oriented to person       Functional Mobility Training:  Bed Mobility:  Bed Mobility Training  Bed Mobility Training: Yes  Overall Level of Assistance: Moderate assistance;Maximum assistance;Additional time;Assist X1  Interventions: Verbal cues;Demonstration;Manual cues;Safety awareness training;Tactile cues;Weight shifting training/pressure relief  Rolling: Moderate assistance;Additional time;Maximum assistance;Assist X1  Supine to Sit: Moderate assistance;Additional time;Assist X1;Minimum assistance  Sit to Supine: Moderate assistance;Assist X2;Additional time  Scooting: Maximum

## 2024-07-25 ENCOUNTER — APPOINTMENT (OUTPATIENT)
Facility: HOSPITAL | Age: 89
DRG: 480 | End: 2024-07-25
Payer: MEDICARE

## 2024-07-25 LAB
ANION GAP SERPL CALC-SCNC: 9 MMOL/L (ref 5–15)
BACTERIA SPEC CULT: NORMAL
BUN SERPL-MCNC: 17 MG/DL (ref 6–20)
BUN/CREAT SERPL: 28 (ref 12–20)
CA-I BLD-MCNC: 8.6 MG/DL (ref 8.5–10.1)
CHLORIDE SERPL-SCNC: 110 MMOL/L (ref 97–108)
CO2 SERPL-SCNC: 22 MMOL/L (ref 21–32)
CREAT SERPL-MCNC: 0.6 MG/DL (ref 0.55–1.02)
ERYTHROCYTE [DISTWIDTH] IN BLOOD BY AUTOMATED COUNT: 16.8 % (ref 11.5–14.5)
GLUCOSE SERPL-MCNC: 110 MG/DL (ref 65–100)
HCT VFR BLD AUTO: 24.4 % (ref 35–47)
HGB BLD-MCNC: 7.7 G/DL (ref 11.5–16)
Lab: NORMAL
MCH RBC QN AUTO: 28.5 PG (ref 26–34)
MCHC RBC AUTO-ENTMCNC: 31.6 G/DL (ref 30–36.5)
MCV RBC AUTO: 90.4 FL (ref 80–99)
NRBC # BLD: 0.14 K/UL (ref 0–0.01)
NRBC BLD-RTO: 0.8 PER 100 WBC
PLATELET # BLD AUTO: 299 K/UL (ref 150–400)
PMV BLD AUTO: 9 FL (ref 8.9–12.9)
POTASSIUM SERPL-SCNC: 3.5 MMOL/L (ref 3.5–5.1)
RBC # BLD AUTO: 2.7 M/UL (ref 3.8–5.2)
SODIUM SERPL-SCNC: 141 MMOL/L (ref 136–145)
WBC # BLD AUTO: 17.6 K/UL (ref 3.6–11)

## 2024-07-25 PROCEDURE — 99024 POSTOP FOLLOW-UP VISIT: CPT

## 2024-07-25 PROCEDURE — 94761 N-INVAS EAR/PLS OXIMETRY MLT: CPT

## 2024-07-25 PROCEDURE — 6370000000 HC RX 637 (ALT 250 FOR IP): Performed by: ORTHOPAEDIC SURGERY

## 2024-07-25 PROCEDURE — 2580000003 HC RX 258: Performed by: HOSPITALIST

## 2024-07-25 PROCEDURE — 1100000000 HC RM PRIVATE

## 2024-07-25 PROCEDURE — 71045 X-RAY EXAM CHEST 1 VIEW: CPT

## 2024-07-25 PROCEDURE — 97530 THERAPEUTIC ACTIVITIES: CPT

## 2024-07-25 PROCEDURE — 6360000002 HC RX W HCPCS: Performed by: HOSPITALIST

## 2024-07-25 PROCEDURE — 80048 BASIC METABOLIC PNL TOTAL CA: CPT

## 2024-07-25 PROCEDURE — 85027 COMPLETE CBC AUTOMATED: CPT

## 2024-07-25 PROCEDURE — 36415 COLL VENOUS BLD VENIPUNCTURE: CPT

## 2024-07-25 PROCEDURE — 6370000000 HC RX 637 (ALT 250 FOR IP): Performed by: HOSPITALIST

## 2024-07-25 RX ORDER — SODIUM CHLORIDE 450 MG/100ML
INJECTION, SOLUTION INTRAVENOUS CONTINUOUS
Status: DISCONTINUED | OUTPATIENT
Start: 2024-07-25 | End: 2024-07-26

## 2024-07-25 RX ADMIN — ENOXAPARIN SODIUM 40 MG: 100 INJECTION SUBCUTANEOUS at 17:32

## 2024-07-25 RX ADMIN — POLYETHYLENE GLYCOL 3350 17 G: 17 POWDER, FOR SOLUTION ORAL at 10:37

## 2024-07-25 RX ADMIN — ACETAMINOPHEN 1000 MG: 500 TABLET ORAL at 14:24

## 2024-07-25 RX ADMIN — SODIUM CHLORIDE: 9 INJECTION, SOLUTION INTRAVENOUS at 06:48

## 2024-07-25 RX ADMIN — PIPERACILLIN AND TAZOBACTAM 4500 MG: 4; .5 INJECTION, POWDER, LYOPHILIZED, FOR SOLUTION INTRAVENOUS at 12:27

## 2024-07-25 RX ADMIN — GABAPENTIN 100 MG: 100 CAPSULE ORAL at 20:43

## 2024-07-25 RX ADMIN — CALCIUM CARBONATE 500 MG: 500 TABLET, CHEWABLE ORAL at 20:43

## 2024-07-25 RX ADMIN — LEVOTHYROXINE SODIUM 50 MCG: 0.03 TABLET ORAL at 05:26

## 2024-07-25 RX ADMIN — PANTOPRAZOLE SODIUM 20 MG: 20 TABLET, DELAYED RELEASE ORAL at 10:36

## 2024-07-25 RX ADMIN — CELECOXIB 100 MG: 100 CAPSULE ORAL at 10:36

## 2024-07-25 RX ADMIN — ACETAMINOPHEN 1000 MG: 500 TABLET ORAL at 20:43

## 2024-07-25 RX ADMIN — ACETAMINOPHEN 1000 MG: 500 TABLET ORAL at 10:36

## 2024-07-25 RX ADMIN — GABAPENTIN 100 MG: 100 CAPSULE ORAL at 14:23

## 2024-07-25 RX ADMIN — SODIUM CHLORIDE, PRESERVATIVE FREE 10 ML: 5 INJECTION INTRAVENOUS at 20:44

## 2024-07-25 RX ADMIN — DULOXETINE HYDROCHLORIDE 20 MG: 20 CAPSULE, DELAYED RELEASE ORAL at 10:36

## 2024-07-25 RX ADMIN — PIPERACILLIN AND TAZOBACTAM 3375 MG: 3; .375 INJECTION, POWDER, LYOPHILIZED, FOR SOLUTION INTRAVENOUS at 17:32

## 2024-07-25 RX ADMIN — ATORVASTATIN CALCIUM 40 MG: 40 TABLET, FILM COATED ORAL at 10:36

## 2024-07-25 RX ADMIN — GABAPENTIN 100 MG: 100 CAPSULE ORAL at 10:39

## 2024-07-25 RX ADMIN — CELECOXIB 100 MG: 100 CAPSULE ORAL at 20:43

## 2024-07-25 RX ADMIN — ROPINIROLE 12 MG: 2 TABLET, FILM COATED, EXTENDED RELEASE ORAL at 10:45

## 2024-07-25 RX ADMIN — SODIUM CHLORIDE: 4.5 INJECTION, SOLUTION INTRAVENOUS at 17:33

## 2024-07-25 RX ADMIN — CALCIUM CARBONATE 500 MG: 500 TABLET, CHEWABLE ORAL at 10:36

## 2024-07-25 ASSESSMENT — PAIN DESCRIPTION - ORIENTATION: ORIENTATION: LEFT

## 2024-07-25 ASSESSMENT — PAIN SCALES - GENERAL
PAINLEVEL_OUTOF10: 4
PAINLEVEL_OUTOF10: 2

## 2024-07-25 ASSESSMENT — PAIN DESCRIPTION - LOCATION: LOCATION: HIP;LEG

## 2024-07-25 ASSESSMENT — PAIN SCALES - WONG BAKER
WONGBAKER_NUMERICALRESPONSE: HURTS A LITTLE BIT
WONGBAKER_NUMERICALRESPONSE: HURTS LITTLE MORE

## 2024-07-25 ASSESSMENT — PAIN - FUNCTIONAL ASSESSMENT: PAIN_FUNCTIONAL_ASSESSMENT: PREVENTS OR INTERFERES SOME ACTIVE ACTIVITIES AND ADLS

## 2024-07-25 ASSESSMENT — PAIN DESCRIPTION - PAIN TYPE: TYPE: SURGICAL PAIN

## 2024-07-25 NOTE — PROGRESS NOTES
ORTHOPEDIC PROGRESS NOTE    Patient: Jericho Gómez MRN: [id number]  SSN: [id number]    YOB: 1935  Age: 89 y.o.  Sex: female      Subjective:      Jericho Gómez is a 89 y.o. female who is being seen for left distal femur fracture following a fall. Patient seen at bedside with family present. They report the patient went out to the [facility] yesterday and fell while going up the stairs injuring her left leg. The family states she has had several falls in the past, two falls which resulted in wrist fractures one of which underwent surgical fixation by Dr. Ramirez in [place], [place]. The patient is from [place] and receives most of her care there. However, following the fall yesterday the family wanted the patient to go to [facility], in hopes that Dr. Ramirez could do surgery for her mother as he fixed her wrist during a previous fall. While at [facility] ED X-rays were obtained and revealed a distal femoral fracture. It was recommended by Dr. Ramirez the patient be sent to [facility] for a higher level of care. Patient's family wanted the patient to be transported to [place], [place] but unfortunately were unable to. Orthopedics here consulted for further evaluation and management.     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 Family states patient lives alone and is independent with her ADLs. She rarely uses a walker but has one at home.    Denies N/V, fever, chills, chest pain, abdominal pain, numbness/tingling, or pain elsewhere other than the left leg.     7/21/2024: Postop day 1 from left retrograde intramedullary nail for a distal femur periprosthetic fracture.  Patient had a hemoglobin of 6.1 this morning and underwent transfusion with postop transfusion CBC pending.  Patient's son and daughter-in-law are present at bedside.  The patient is to be toe-touch weightbearing for 6 weeks given the spiral fracture  hypertension     Hypothyroidism (acquired) 09/26/2011    Influenza A (H1N1) 03/27/2022    Mixed hyperlipidemia     Nonrheumatic aortic valve stenosis 02/28/2019    Paroxysmal atrial fibrillation (HCC) 03/27/2022    Formatting of this note might be different from the original.   Formatting of this note might be different from the original.   Takes Eliquis and Lopressor  Formatting of this note might be different from the original.   Takes Eliquis and Lopressor    Spinal stenosis of lumbar region with neurogenic claudication 01/28/2015     Past Surgical History:   Procedure Laterality Date    CHOLECYSTECTOMY      FERTILITY SURGERY Left 7/20/2024    FEMUR INTRAMEDULLARY NAIL RETROGRADE, periprosthetic fracture performed by Beto Martini MD at [facility] MAIN OR    HYSTERECTOMY (CERVIX STATUS UNKNOWN)      WRIST CLOSED REDUCTION Left 11/6/2023    LEFT WRIST CLOSED REDUCTION AND PINNING performed by Elijah Ramirez MD at [facility] MAIN OR      Family History   Problem Relation Age of Onset    No Known Problems Mother     No Known Problems Father      Social History     Tobacco Use    Smoking status: Never    Smokeless tobacco: Never   Substance Use Topics    Alcohol use: Never      Prior to Admission medications    Medication Sig Start Date End Date Taking? Authorizing Provider   potassium chloride (KLOR-CON) 10 MEQ extended release tablet  6/24/24   Zoe Kendall MD   DULoxetine (CYMBALTA) 20 MG extended release capsule Take 1 capsule by mouth daily    Zoe Kendall MD   fentaNYL (DURAGESIC) 50 MCG/HR Place 1 patch onto the skin every 72 hours.    Zoe Kendall MD   gabapentin (NEURONTIN) 100 MG capsule Take 1 capsule by mouth 3 times daily.    Zoe Kendall MD   atorvastatin (LIPITOR) 40 MG tablet Take 1 tablet by mouth daily    Zoe Kendall MD   levothyroxine (SYNTHROID) 50 MCG tablet Take 1 tablet by mouth Daily    Zoe Kendall MD   hydroCHLOROthiazide (MICROZIDE) 12.5 MG

## 2024-07-25 NOTE — PLAN OF CARE
Problem: Discharge Planning  Goal: Discharge to home or other facility with appropriate resources  Outcome: Progressing     Problem: Pain  Goal: Verbalizes/displays adequate comfort level or baseline comfort level  Outcome: Progressing     Problem: Skin/Tissue Integrity  Goal: Absence of new skin breakdown  Description: 1.  Monitor for areas of redness and/or skin breakdown  2.  Assess vascular access sites hourly  3.  Every 4-6 hours minimum:  Change oxygen saturation probe site  4.  Every 4-6 hours:  If on nasal continuous positive airway pressure, respiratory therapy assess nares and determine need for appliance change or resting period.  Outcome: Progressing     Problem: Safety - Adult  Goal: Free from fall injury  Outcome: Progressing     Problem: ABCDS Injury Assessment  Goal: Absence of physical injury  Outcome: Progressing     Problem: Physical Therapy - Adult  Goal: By Discharge: Performs mobility at highest level of function for planned discharge setting.  See evaluation for individualized goals.  Description: FUNCTIONAL STATUS PRIOR TO ADMISSION: Patient was MIN A for ADLs and using a rollator for functional mobility.    HOME SUPPORT PRIOR TO ADMISSION: Patient is from facility[facility] and needs assist for bathing there.Patient reported she was able to amb.    Physical Therapy Goals  Initiated 7/22/2024  Pt stated goal: Want to go back to [facility]  Pt will be I with LE HEP in 7 days.  Pt will perform bed mobility with Minimal Assist in 7 days.  Pt will perform transfers with Minimal Assist in 7 days.   Pt will amb 20 feet with LRAD safely with Minimal Assist in 7 days.  Pt will verbalize and demonstrate compliance with fall/TTWB  precautions  in 7 days.   Pt will demonstrate improvement in standing/gait balance from poor to fair in 7 days.    7/24/2024 1447 by Bettie Ramirez, PT  Outcome: Progressing  7/24/2024 1313 by Bettie Ramirez, PT  Outcome: Progressing     Problem: Occupational Therapy - Adult  Goal: By  Discharge: Performs self-care activities at highest level of function for planned discharge setting.  See evaluation for individualized goals.  Description: FUNCTIONAL STATUS PRIOR TO ADMISSION:  Prior to recent admission[facility] and required assistance with ADLs.    HOME SUPPORT: The patient lived in an LEW with 24 hour care.    Occupational Therapy Goals:  Initiated 7/22/2024  Patient/Family stated goal: return to [facility]  1.  Patient will perform self-feeding with [place] within 7 day(s).  2.  Patient will perform grooming with [place] within 7 day(s).  3.  Patient will perform upper body dressing with [place] within 7 day(s).  4.  Patient will perform toilet transfers with [place]  within 7 day(s).  5.  Patient will perform all aspects of toileting with [place] within 7 day(s).  6.  Patient will participate in upper extremity therapeutic exercise/activities with [place] within 7 day(s).    7/24/2024 1532 by Kate Mancia OTA  Outcome: Progressing     Problem: SLP Adult - Impaired Swallowing  Goal: By Discharge: Advance to least restrictive diet without signs or symptoms of aspiration for planned discharge setting.  See evaluation for individualized goals.  Description: Speech Therapy Swallow Goals  Initiated 7/24/24  -Patient will tolerate ETC diet with thin liquids without clinical indicators of aspiration given min cues within 7 day(s).        -Patient will tolerate PO trials without clinical indicators of aspiration given min cues within 7 day(s).      -Patient will participate in modified barium swallow study within 7 day(s).      -Patient will demonstrate understanding of swallow safety precautions and aspiration precautions, diet recs with min cues within 7day(s).    -Patient/caregiver goal: To go home   7/24/2024 1505 by Carito Briggs, SLP  Outcome: Progressing  7/24/2024 1504 by Carito Briggs, SLP  Outcome: Progressing      Problem: Physical Therapy - Adult  Goal: By Discharge: Performs mobility at highest level of function for planned discharge setting.  See evaluation for individualized goals.  Description: FUNCTIONAL STATUS PRIOR TO ADMISSION: Patient was MIN A for ADLs and using a rollator for functional mobility.    HOME SUPPORT PRIOR TO ADMISSION: Patient is from facility--intermediate and needs assist for bathing there.Patient reported she was able to amb.    Physical Therapy Goals  Initiated 7/22/2024  Pt stated goal: Want to go back to intermediate  Pt will be I with LE HEP in 7 days.  Pt will perform bed mobility with Minimal Assist in 7 days.  Pt will perform transfers with Minimal Assist in 7 days.   Pt will amb 20 feet with LRAD safely with Minimal Assist in 7 days.  Pt will verbalize and demonstrate compliance with fall/TTWB  precautions  in 7 days.   Pt will demonstrate improvement in standing/gait balance from poor to fair in 7 days.    7/24/2024 1447 by Bettie Ramirez, PT  Outcome: Progressing  7/24/2024 1313 by Bettie Ramirez, PT  Outcome: Progressing     Problem: Confusion  Goal: Confusion, delirium, dementia, or psychosis is improved or at baseline  Description: INTERVENTIONS:  1. Assess for possible contributors to thought disturbance, including medications, impaired vision or hearing, underlying metabolic abnormalities, dehydration, psychiatric diagnoses, and notify attending LIP  2. Surveyor high risk fall precautions, as indicated  3. Provide frequent short contacts to provide reality reorientation, refocusing and direction  4. Decrease environmental stimuli, including noise as appropriate  5. Monitor and intervene to maintain adequate nutrition, hydration, elimination, sleep and activity  6. If unable to ensure safety without constant attention obtain sitter and review sitter guidelines with assigned personnel  7. Initiate Psychosocial CNS and Spiritual Care consult, as indicated  Outcome: Progressing

## 2024-07-25 NOTE — CARE COORDINATION
12:58PM Inbound call from admissions coordinator (Delmis) @ [facility]. Informed admissions coordinator, not medically stable d/t elevated WBC and on IV ABX (Zosyn).  Admissions coordinator voiced \"if she has to be d/c on IV ABX, we can accommodate.  Nursing staff prefers a midline.\"      Room assignment [address] [place] [address].  RN to call report @ [phone] main number.      Delmis (admissions/[facility]) @ [phone]. Fax#[phone]. D/C summary and MAR to be faxed to this SNF.      VIMAL Monte      12:52PM Patient not medically stable for discharge to SNF.  Elevated WBC 17.6. Started on IV ABX (Zosyn).  Stat x-ray done earlier this morning.       VIMAL Monte

## 2024-07-25 NOTE — PROGRESS NOTES
RRT Clinical Rounding Nurse Progress Report      SUBJECTIVE: Patient assessed secondary to elevated Deterioration Index Score.      Vitals:    07/25/24 0123 07/25/24 0219 07/25/24 0249 07/25/24 0945   BP:   (!) 124/53 (!) 149/64   Pulse:   73 83   Resp: 16 16 22 22   Temp:   97.5 °F (36.4 °C) 98.2 °F (36.8 °C)   TempSrc:   Oral Oral   SpO2:   90% 96%   Weight:       Height:            DETERIORATION INDEX SCORE: 59        PLAN:  Notified KARI Mayers of elevated DI score. States physician has already been bedside and orders received.    OWEN DELGADO RN

## 2024-07-25 NOTE — PROGRESS NOTES
OCCUPATIONAL THERAPY TREATMENT  Patient: Jericho Gómez (89 y.o. 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]  Closed fracture of femur, unspecified fracture morphology, unspecified laterality, unspecified portion of femur, initial encounter ([facility]) [S72.90XA]  Procedure(s) (LRB):  FEMUR INTRAMEDULLARY NAIL RETROGRADE, periprosthetic fracture (Left) 5 Days Post-Op   Precautions: General Precautions, Weight Bearing   Left Lower Extremity Weight Bearing: Toe Touch Weight Bearing            Recommendations for nursing mobility: Out of bed to chair for meals, Encourage HEP in prep for ADLs/mobility; see handout for details, Frequent repositioning to prevent skin breakdown, Use of bed/chair alarm for safety, AD and gt belt for bed to chair , and Assist x2    In place during session: Peripheral IV and External Catheter  Chart, occupational therapy assessment, plan of care, and goals were reviewed.  ASSESSMENT  Patient continues with skilled OT services and is progressing towards goals. Pt presented sitting up in bed upon GUTIERREZ/PTA arrival, agreeable to session. Pt A&O x 4. Pt demonstrated improvement in standing tolerance and functional mobility today. Pt mod A x2 for bed mobility and Max x2 for standing.  Pt incontinent of bowel.  Pt was dependent for hygiene.  Pt required three seated rest breaks during hygiene.  Pt transferred to C with Max A x2.  Pt returned supine in bed max A x2.  Activities completed in prep for toilet transfers, LB bathing, dressing and toileting.  Pt was able to follow during first stand.  Afterwards, pt had difficulty maintaining TTWB. Pt was left semi supine with all needs met. (See below for objective details and assist levels).     Overall pt tolerated session fair today with fair with frequent rest breaks.  Potential barriers for safe discharge: pt lives alone, pt has poor safety  awareness, pt has impaired cognition, pt is a high fall risk, pt is not safe to be alone, concern for pt safely navigating or managing the home environment, and pt has new weight bearing restrictions limiting activity or patient is unable to maintain. Current OT recommendations for discharge Skilled Nursing Facility. Will continue to benefit from skilled OT services, and will continue to progress as tolerated.      Start of Session End of Session   SPO2 (%) 96 94   Heart Rate (BPM) 83 97     GOALS:    Problem: Occupational Therapy - Adult  Goal: By Discharge: Performs self-care activities at highest level of function for planned discharge setting.  See evaluation for individualized goals.  Description: FUNCTIONAL STATUS PRIOR TO ADMISSION:  Prior to recent admission, pt was living at an [facility] in the memory care unit and required assistance with ADLs.    HOME SUPPORT: The patient lived in an LEW with 24 hour care.    Occupational Therapy Goals:  Initiated 7/22/2024  Patient/Family stated goal: return to LEW  1.  Patient will perform self-feeding with [place] within 7 day(s).  2.  Patient will perform grooming with [place] within 7 day(s).  3.  Patient will perform upper body dressing with [place] within 7 day(s).  4.  Patient will perform toilet transfers with [place]  within 7 day(s).  5.  Patient will perform all aspects of toileting with [place] within 7 day(s).  6.  Patient will participate in upper extremity therapeutic exercise/activities with [place] within 7 day(s).    Outcome: Progressing        PLAN :  Patient continues to benefit from skilled intervention to address functional impairments. Continue treatment per established plan of care to address goals.    Recommend next OT session: Toileting    Recommendation for discharge: (in order for the patient to meet his/her long term goals): Skilled Nursing Facility    IF patient discharges home will need the following DME: continuing  notified, rest, and repositioning    Activity Tolerance:   Fair  and requires frequent rest breaks    After treatment patient left in no apparent distress:   Bed locked and returned to lowest position, Patient left in no apparent distress in bed, Call bell within reach, Bed/ chair alarm activated, Side rails x3, and Updated patient's board on functional status and mobility recommendations, and nsg updated     COMMUNICATION/EDUCATION:   The patient’s plan of care was discussed with: Physical therapy assistant and Registered nurse    Patient Education  Education Given To: Patient  Education Provided: Plan of Care;Fall Prevention Strategies;Transfer Training  Education Method: Demonstration;Verbal  Barriers to Learning: Cognition  Education Outcome: Continued education needed    Thank you for this referral.  WISAM Fisher  Minutes: 60

## 2024-07-25 NOTE — WOUND CARE
Requested delivery of a low air loss surface from [employer].  Bed will deliver this afternoon, confirmation #[id number].  Patient high risk for skin breakdown due to medical condition and limited mobility.

## 2024-07-25 NOTE — PLAN OF CARE
PHYSICAL THERAPY TREATMENT     Patient: Jericho Gómez (89 y.o. 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  ASSESSMENT  Patient continues with skilled PT services and is progressing towards goals. Pt sitting in bed upon PT arrival, agreeable to session. Pt A&O x 4 but pleasantly confused. (See below for objective details and assist levels).     Overall pt tolerated session fair today.  Patient demos improved functional mobility today however still requiring mod-max A x2 for all mobility at this time. Patient did maintain TTWB better today on LLE however req cues to maintain as pt fatigues and pt was unable to recall WB precautions prior to mobility. Patient educated on importance of therex and mobility. Pt was able to complete a few stands from the bed and BSC however patient demos difficulty with shuffling steps and very quick fatigue. Unsteady mobility at this time and progressing slowly with therapy. Will continue to benefit from skilled PT services, and will continue to progress as tolerated.  Potential barriers for safe discharge: pt has poor safety awareness, pt has impaired cognition, pt is a high fall risk, pt is not safe to be alone, concern for pt safely navigating or managing the home environment, and pt has new weight bearing restrictions limiting activity or patient is unable to maintain. Current PT DC recommendation Skilled Nursing Facility once medically appropriate.     Start of Session End of Session   SPO2 (%) 96 94   Heart Rate (BPM) 83 97     GOALS:    Problem: Physical Therapy - Adult  Goal: By Discharge: Performs mobility at highest level of function for planned discharge setting.  See evaluation for individualized goals.  Description: FUNCTIONAL STATUS PRIOR TO ADMISSION: Patient was MIN A for ADLs and using a rollator for functional mobility.    HOME SUPPORT PRIOR TO ADMISSION: Patient is from facility--[facility] and needs assist for bathing there.Patient reported she was able to amb.    Physical Therapy Goals  Initiated 7/22/2024  Pt stated goal: Want to go back to [facility]  Pt will be I with LE HEP in 7 days.  Pt will perform bed mobility with Minimal Assist in 7 days.  Pt will perform transfers with Minimal Assist in 7 days.   Pt will amb 20 feet with LRAD safely with Minimal Assist in 7 days.  Pt will verbalize and demonstrate compliance with fall/TTWB  precautions  in 7 days.   Pt will demonstrate improvement in standing/gait balance from poor to fair in 7 days.    Outcome: Progressing          PLAN :  Patient continues to benefit from skilled intervention to address functional impairments. Continue treatment per established plan of care to address goals.    Recommendation for discharge: (in order for the patient to meet his/her long term goals)  Skilled Nursing Facility    IF patient discharges home will need the following DME:continuing to assess with progress     SUBJECTIVE:   Patient stated “I don't want to fall”    OBJECTIVE DATA SUMMARY:   Critical Behavior:  Orientation  Overall  Orientation Status: Impaired  Orientation Level: Oriented X4  Cognition  Following Commands: Follows one step commands with increased time;Follows one step commands with repetition  Attention Span: Attends with cues to redirect  Memory: Impaired  Safety Judgement: Impaired  Problem Solving: Assistance required to implement solutions;Assistance required to identify errors made;Decreased awareness of errors;Assistance required to generate solutions;Assistance required to correct errors made  Initiation: Requires cues for some  Sequencing: Requires cues for some    Functional Mobility Training:  Bed Mobility:  Bed Mobility Training  Interventions: Verbal cues;Demonstration;Manual cues;Safety awareness training;Tactile cues;Weight shifting training/pressure relief  Supine to Sit: Moderate assistance;Assist X2;Additional time  Sit to Supine: Maximum assistance;Assist X2;Additional time  Scooting: Assist X2;Maximum assistance  Transfers:  Transfer Training  Interventions: Demonstration;Verbal cues;Manual cues;Safety awareness training;Tactile cues  Sit to Stand: Maximum assistance;Additional time;Assist X2  Stand to Sit: Maximum assistance;Additional time;Assist X2  Stand Pivot Transfers: Assist X2;Moderate assistance  Toilet Transfer: Assist X2;Moderate assistance;Additional time  Balance:  Balance  Sitting: Intact  Standing: Impaired  Standing - Static: Constant support;Poor  Standing - Dynamic: Poor;Constant support    Ambulation/Gait Training:           Left Side Weight Bearing: Toe touch    Gait  Gait Training: Yes  Left Side Weight Bearing: Toe touch  Distance (ft): 4 Feet (2ft x2)  Assistive Device: Gait belt;Walker, rolling  Interventions: Demonstration;Verbal cues;Manual cues;Safety awareness training;Tactile cues;Visual cues  Speed/Cherelle: Fluctuations  Step Length: Left shortened           Therapeutic Exercises:   10x ankle pumps, educated on glut sets and quad sets     Pain Ratin/10  reported  Pain

## 2024-07-25 NOTE — PROGRESS NOTES
.Progress Note (Hospitalist, Internal Medicine)  IDENTIFYING INFORMATION   PATIENT:  Jericho Gómez  MRN:  [id number]  ADMIT DATE: 7/18/2024  TIME OF EVALUATION: 7/25/2024 5:08 PM      HISTORY OF PRESENT ILLNESS   Jericho Gómez is a 89 y.o. female who presents with       SUBJECTIVE     Comfortably in bed.  Hemoglobin low but stable.  White cell count persistently elevated.  She has remained afebrile.  This morning she was noted to have labored breathing.  She was also noted to have become hypoxic with O2 saturation dipping down to 89% on room air.  Chest x-ray concerning for infiltrate for which she was initiated on Zosyn for possible aspiration pneumonia.    MEDICATIONS   Medications Prior to Admission  Medications Prior to Admission: potassium chloride (KLOR-CON) 10 MEQ extended release tablet,   DULoxetine (CYMBALTA) 20 MG extended release capsule, Take 1 capsule by mouth daily  fentaNYL (DURAGESIC) 50 MCG/HR, Place 1 patch onto the skin every 72 hours.  gabapentin (NEURONTIN) 100 MG capsule, Take 1 capsule by mouth 3 times daily.  atorvastatin (LIPITOR) 40 MG tablet, Take 1 tablet by mouth daily  levothyroxine (SYNTHROID) 50 MCG tablet, Take 1 tablet by mouth Daily  hydroCHLOROthiazide (MICROZIDE) 12.5 MG capsule, Take 1 capsule by mouth every morning  meloxicam (MOBIC) 15 MG tablet, Take 1 tablet by mouth daily  metoprolol tartrate (LOPRESSOR) 50 MG tablet, Take 1 tablet by mouth 2 times daily  pantoprazole (PROTONIX) 20 MG tablet, Take 1 tablet by mouth daily  rOPINIRole (REQUIP XL) 12 MG TB24 extended release tablet, Take 1 tablet by mouth daily  furosemide (LASIX) 20 MG tablet, Take 0.5 tablets by mouth daily  vitamin B-12 (CYANOCOBALAMIN) 100 MCG tablet, Take 0.5 tablets by mouth daily  calcium carbonate (OSCAL) 500 MG TABS tablet, Take 1 tablet by mouth daily    Current Medications  Current Facility-Administered Medications   Medication Dose Route Frequency Provider Last Rate Last Admin

## 2024-07-26 ENCOUNTER — APPOINTMENT (OUTPATIENT)
Facility: HOSPITAL | Age: 89
DRG: 480 | End: 2024-07-26
Payer: MEDICARE

## 2024-07-26 LAB
ANION GAP SERPL CALC-SCNC: 9 MMOL/L (ref 5–15)
BUN SERPL-MCNC: 14 MG/DL (ref 6–20)
BUN/CREAT SERPL: 25 (ref 12–20)
CA-I BLD-MCNC: 8.2 MG/DL (ref 8.5–10.1)
CHLORIDE SERPL-SCNC: 111 MMOL/L (ref 97–108)
CO2 SERPL-SCNC: 22 MMOL/L (ref 21–32)
CREAT SERPL-MCNC: 0.55 MG/DL (ref 0.55–1.02)
ERYTHROCYTE [DISTWIDTH] IN BLOOD BY AUTOMATED COUNT: 16.9 % (ref 11.5–14.5)
GLUCOSE SERPL-MCNC: 100 MG/DL (ref 65–100)
HCT VFR BLD AUTO: 23 % (ref 35–47)
HGB BLD-MCNC: 7.3 G/DL (ref 11.5–16)
LACTATE SERPL-SCNC: 1 MMOL/L (ref 0.4–2)
MCH RBC QN AUTO: 28.7 PG (ref 26–34)
MCHC RBC AUTO-ENTMCNC: 31.7 G/DL (ref 30–36.5)
MCV RBC AUTO: 90.6 FL (ref 80–99)
NRBC # BLD: 0.1 K/UL (ref 0–0.01)
NRBC BLD-RTO: 0.7 PER 100 WBC
PLATELET # BLD AUTO: 285 K/UL (ref 150–400)
PMV BLD AUTO: 8.8 FL (ref 8.9–12.9)
POTASSIUM SERPL-SCNC: 2.9 MMOL/L (ref 3.5–5.1)
RBC # BLD AUTO: 2.54 M/UL (ref 3.8–5.2)
SODIUM SERPL-SCNC: 142 MMOL/L (ref 136–145)
WBC # BLD AUTO: 14.3 K/UL (ref 3.6–11)

## 2024-07-26 PROCEDURE — 51702 INSERT TEMP BLADDER CATH: CPT

## 2024-07-26 PROCEDURE — 2580000003 HC RX 258: Performed by: HOSPITALIST

## 2024-07-26 PROCEDURE — 92526 ORAL FUNCTION THERAPY: CPT

## 2024-07-26 PROCEDURE — 85027 COMPLETE CBC AUTOMATED: CPT

## 2024-07-26 PROCEDURE — 6370000000 HC RX 637 (ALT 250 FOR IP): Performed by: ORTHOPAEDIC SURGERY

## 2024-07-26 PROCEDURE — 6370000000 HC RX 637 (ALT 250 FOR IP): Performed by: HOSPITALIST

## 2024-07-26 PROCEDURE — 1100000000 HC RM PRIVATE

## 2024-07-26 PROCEDURE — 97530 THERAPEUTIC ACTIVITIES: CPT

## 2024-07-26 PROCEDURE — 36415 COLL VENOUS BLD VENIPUNCTURE: CPT

## 2024-07-26 PROCEDURE — 83605 ASSAY OF LACTIC ACID: CPT

## 2024-07-26 PROCEDURE — 6360000002 HC RX W HCPCS: Performed by: HOSPITALIST

## 2024-07-26 PROCEDURE — 80048 BASIC METABOLIC PNL TOTAL CA: CPT

## 2024-07-26 PROCEDURE — 71045 X-RAY EXAM CHEST 1 VIEW: CPT

## 2024-07-26 PROCEDURE — 94761 N-INVAS EAR/PLS OXIMETRY MLT: CPT

## 2024-07-26 RX ORDER — IPRATROPIUM BROMIDE AND ALBUTEROL SULFATE 2.5; .5 MG/3ML; MG/3ML
1 SOLUTION RESPIRATORY (INHALATION) EVERY 4 HOURS PRN
Status: DISCONTINUED | OUTPATIENT
Start: 2024-07-26 | End: 2024-07-30 | Stop reason: HOSPADM

## 2024-07-26 RX ADMIN — CELECOXIB 100 MG: 100 CAPSULE ORAL at 21:25

## 2024-07-26 RX ADMIN — GABAPENTIN 100 MG: 100 CAPSULE ORAL at 09:58

## 2024-07-26 RX ADMIN — CALCIUM CARBONATE 500 MG: 500 TABLET, CHEWABLE ORAL at 09:59

## 2024-07-26 RX ADMIN — GABAPENTIN 100 MG: 100 CAPSULE ORAL at 15:37

## 2024-07-26 RX ADMIN — PIPERACILLIN AND TAZOBACTAM 3375 MG: 3; .375 INJECTION, POWDER, LYOPHILIZED, FOR SOLUTION INTRAVENOUS at 03:24

## 2024-07-26 RX ADMIN — ACETAMINOPHEN 1000 MG: 500 TABLET ORAL at 21:25

## 2024-07-26 RX ADMIN — DULOXETINE HYDROCHLORIDE 20 MG: 20 CAPSULE, DELAYED RELEASE ORAL at 09:59

## 2024-07-26 RX ADMIN — ATORVASTATIN CALCIUM 40 MG: 40 TABLET, FILM COATED ORAL at 09:57

## 2024-07-26 RX ADMIN — ACETAMINOPHEN 1000 MG: 500 TABLET ORAL at 15:38

## 2024-07-26 RX ADMIN — CALCIUM CARBONATE 500 MG: 500 TABLET, CHEWABLE ORAL at 21:27

## 2024-07-26 RX ADMIN — LEVOTHYROXINE SODIUM 50 MCG: 0.03 TABLET ORAL at 06:01

## 2024-07-26 RX ADMIN — GABAPENTIN 100 MG: 100 CAPSULE ORAL at 21:25

## 2024-07-26 RX ADMIN — PIPERACILLIN AND TAZOBACTAM 3375 MG: 3; .375 INJECTION, POWDER, LYOPHILIZED, FOR SOLUTION INTRAVENOUS at 10:26

## 2024-07-26 RX ADMIN — ENOXAPARIN SODIUM 40 MG: 100 INJECTION SUBCUTANEOUS at 17:00

## 2024-07-26 RX ADMIN — SODIUM CHLORIDE: 4.5 INJECTION, SOLUTION INTRAVENOUS at 06:02

## 2024-07-26 RX ADMIN — ROPINIROLE 12 MG: 2 TABLET, FILM COATED, EXTENDED RELEASE ORAL at 10:00

## 2024-07-26 RX ADMIN — SODIUM CHLORIDE, PRESERVATIVE FREE 10 ML: 5 INJECTION INTRAVENOUS at 10:01

## 2024-07-26 RX ADMIN — PANTOPRAZOLE SODIUM 20 MG: 20 TABLET, DELAYED RELEASE ORAL at 09:58

## 2024-07-26 RX ADMIN — PIPERACILLIN AND TAZOBACTAM 3375 MG: 3; .375 INJECTION, POWDER, LYOPHILIZED, FOR SOLUTION INTRAVENOUS at 18:26

## 2024-07-26 RX ADMIN — CELECOXIB 100 MG: 100 CAPSULE ORAL at 09:58

## 2024-07-26 RX ADMIN — ACETAMINOPHEN 1000 MG: 500 TABLET ORAL at 09:56

## 2024-07-26 RX ADMIN — POLYETHYLENE GLYCOL 3350 17 G: 17 POWDER, FOR SOLUTION ORAL at 10:00

## 2024-07-26 RX ADMIN — IPRATROPIUM BROMIDE AND ALBUTEROL SULFATE 1 DOSE: .5; 3 SOLUTION RESPIRATORY (INHALATION) at 05:47

## 2024-07-26 RX ADMIN — SODIUM CHLORIDE, PRESERVATIVE FREE 10 ML: 5 INJECTION INTRAVENOUS at 21:27

## 2024-07-26 ASSESSMENT — PAIN DESCRIPTION - DESCRIPTORS: DESCRIPTORS: DISCOMFORT

## 2024-07-26 ASSESSMENT — PAIN SCALES - GENERAL
PAINLEVEL_OUTOF10: 5
PAINLEVEL_OUTOF10: 5
PAINLEVEL_OUTOF10: 0

## 2024-07-26 ASSESSMENT — PAIN DESCRIPTION - ORIENTATION
ORIENTATION: LEFT
ORIENTATION: LEFT;OUTER

## 2024-07-26 ASSESSMENT — PAIN DESCRIPTION - LOCATION
LOCATION: HIP
LOCATION: LEG

## 2024-07-26 NOTE — PROGRESS NOTES
.Progress Note (Hospitalist, Internal Medicine)  IDENTIFYING INFORMATION   PATIENT:  Jericho Gómez  MRN:  [id number]  ADMIT DATE: 7/18/2024  TIME OF EVALUATION: 7/26/2024 2:49 PM      HISTORY OF PRESENT ILLNESS   Jericho Gómez is a 89 y.o. female who presents with       SUBJECTIVE   7/26/2024  Patient initiated on Zosyn.  She is more alert and more communicative today.  Lung bases sound wet.  IV fluid discontinued.  Patient has a very poor appetite.  White cell count improved overnight.  Review by speech and patient being set up for modified barium swallow evaluation on Monday.    7/25/2024  Comfortably in bed.  Hemoglobin low but stable.  White cell count persistently elevated.  She has remained afebrile.  This morning she was noted to have labored breathing.  She was also noted to have become hypoxic with O2 saturation dipping down to 89% on room air.  Chest x-ray concerning for infiltrate for which she was initiated on Zosyn for possible aspiration pneumonia.    MEDICATIONS   Medications Prior to Admission  Medications Prior to Admission: potassium chloride (KLOR-CON) 10 MEQ extended release tablet,   DULoxetine (CYMBALTA) 20 MG extended release capsule, Take 1 capsule by mouth daily  fentaNYL (DURAGESIC) 50 MCG/HR, Place 1 patch onto the skin every 72 hours.  gabapentin (NEURONTIN) 100 MG capsule, Take 1 capsule by mouth 3 times daily.  atorvastatin (LIPITOR) 40 MG tablet, Take 1 tablet by mouth daily  levothyroxine (SYNTHROID) 50 MCG tablet, Take 1 tablet by mouth Daily  hydroCHLOROthiazide (MICROZIDE) 12.5 MG capsule, Take 1 capsule by mouth every morning  meloxicam (MOBIC) 15 MG tablet, Take 1 tablet by mouth daily  metoprolol tartrate (LOPRESSOR) 50 MG tablet, Take 1 tablet by mouth 2 times daily  pantoprazole (PROTONIX) 20 MG tablet, Take 1 tablet by mouth daily  rOPINIRole (REQUIP XL) 12 MG TB24 extended release tablet, Take 1 tablet by mouth daily  furosemide (LASIX) 20 MG tablet, Take 0.5  antihypertensive regimen    Hypothyroidism    Mixed hyperlipidemia  Paroxysmal atrial fibrillation-she is currently in RSR.  Review of the record shows that she is not on anticoagulation I am not sure why, probably from the risk from her fall    Aortic stenosis no issues          [facility]  7/26/2024 at 2:49 PM

## 2024-07-26 NOTE — CARE COORDINATION
11:28AM Patient to do MBS on Monday, per attending.     VIMAL Monte      10:34AM Inbound call from admissions coordinator Delmis ([facility]).  Informed admissions coordinator, patient not medically stable for discharge.  Per admissions coordinator \"we don't accept weekend discharges.  Due to her not being medically stable, she can admit on Monday.\"      Perfect serve message sent to attending informing of no weekend admissions, and bed available on Monday.     VIMAL Monte      10:05AM Consulted w/attending re: discharge. Patient not medically stable for d/c.  WBC elevated. Currently on  IV ABX (Zosyn).        VIMAL Monte

## 2024-07-26 NOTE — PLAN OF CARE
PHYSICAL THERAPY TREATMENT     Patient: Jericho Gómez (89 y.o. 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  ASSESSMENT  Patient continues with skilled PT services and is progressing towards goals. Pt in bed upon PT arrival, agreeable to session. Pt A&O x 4. (See below for objective details and assist levels).     Overall pt tolerated session poor today. Pt noted with more confusion, despite A&O x4 and more fatigue. Pt overall req mod-max Ax2 for bed mobility and transfers. Noted with incontinent BM upon standing req assist for pericare. Pt very unsteady today and appeared overall weak with quick fatigue. Held further OOB/gait for safety secondary to increased confusion. Pt HR elevated as well at end of tx to 125 after mobility. Pt returned to resting in bed after pericare/linen change and educated on LE therex and importance of mobility. Will continue to benefit from skilled PT services, and will continue to progress as tolerated. Potential barriers for safe discharge: pt has poor safety awareness, pt has  X4  Cognition  Overall Cognitive Status: Exceptions  Arousal/Alertness: Impaired  Following Commands: Follows one step commands with increased time;Follows one step commands with repetition;Inconsistently follows commands;Impaired  Attention Span: Attends with cues to redirect;Impaired  Memory: Impaired  Safety Judgement: Impaired;Decreased awareness of need for assistance;Decreased awareness of need for safety  Problem Solving: Assistance required to implement solutions;Assistance required to identify errors made;Decreased awareness of errors;Assistance required to generate solutions;Assistance required to correct errors made  Insights: Decreased awareness of deficits  Initiation: Requires cues for all  Sequencing: Requires cues for all    Functional Mobility Training:  Bed Mobility:  Bed Mobility Training  Overall Level of Assistance: Moderate assistance;Maximum assistance;Additional time;Assist X1  Rolling: Moderate assistance;Additional time;Maximum assistance;Assist X2  Supine to Sit: Moderate assistance;Assist X2  Sit to Supine: Maximum assistance;Assist X2;Additional time  Scooting: Maximum assistance;Assist X2  Transfers:  Transfer Training  Overall Level of Assistance: Moderate assistance;Maximum assistance;Assist X2;Additional time  Interventions: Demonstration;Verbal cues;Manual cues;Safety awareness training;Tactile cues  Sit to Stand: Maximum assistance;Additional time;Assist X2  Stand to Sit: Maximum assistance;Additional time;Assist X2  Balance:  Balance  Sitting: Intact  Standing: Impaired  Standing - Static: Constant support;Poor  Standing - Dynamic: Poor;Constant support       Pain Ratin/10 LLE and back  reported    Activity Tolerance:   Fair  and requires rest breaks    After treatment patient left in no apparent distress:   Bed locked and returned to lowest position, Patient left in no apparent distress in bed, Call bell within reach, and Side rails x3, and nsg updated      COMMUNICATION/COLLABORATION:   The patient’s plan of care was discussed with: Occupational therapy assistant and Registered nurse PT/OT sessions occurred together for increased safety of pt and clinician.     Patient Education  Education Given To: Patient  Education Provided: Role of Therapy;Plan of Care;Orientation;Fall Prevention Strategies;Home Exercise Program;Precautions;Transfer Training;Energy Conservation  Education Method: Demonstration;Verbal;Teach Back  Barriers to Learning: Cognition  Education Outcome: Continued education needed        Ashly Lockwood, MALIK  Minutes: 40

## 2024-07-26 NOTE — PROGRESS NOTES
OCCUPATIONAL THERAPY TREATMENT  Patient: Jericho Gómez (89 y.o. 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]  Closed fracture of femur, unspecified fracture morphology, unspecified laterality, unspecified portion of femur, initial encounter ([facility]) [S72.90XA]  Procedure(s) (LRB):  FEMUR INTRAMEDULLARY NAIL RETROGRADE, periprosthetic fracture (Left) 6 Days Post-Op   Precautions: General Precautions, Weight Bearing   Left Lower Extremity Weight Bearing: Toe Touch Weight Bearing            Recommendations for nursing mobility: Encourage HEP in prep for ADLs/mobility; see handout for details, Frequent repositioning to prevent skin breakdown, Use of bed/chair alarm for safety, and Assist x2    In place during session: Nasal Cannula 2L and External Catheter  Chart, occupational therapy assessment, plan of care, and goals were reviewed.  ASSESSMENT  Patient continues with skilled OT services and is progressing towards goals. Pt semi supine upon GUTIERREZ arrival, agreeable to session. Pt A&O x 4. PTA in working with pt.  Pt with increase confusion and difficulty following simple commands.  Pt kept eyes closed  most of session.  Even with eyes open, pt demonstrated difficulty reaching out for walker and bed rail (under shooting). Patient mod - max A x2 for bed bed mobility and sit <> stand. Pt was incontinent of BM and was dependent for anterior and posterior hygiene.  Pt was left semi supine in bed with all needs met.  (See below for objective details and assist levels).     Overall pt tolerated session poorly today with increase confusion.  Potential barriers for safe discharge: pt lives alone, pt has poor safety awareness, pt has impaired cognition, pt is a high fall risk, pt is not safe to be alone, and concern for pt safely navigating or managing the home environment. Current OT recommendations for discharge Skilled

## 2024-07-26 NOTE — PROGRESS NOTES
ORTHOPEDIC PROGRESS NOTE    Patient: Jericho Gmóez MRN: [id number]  SSN: [id number]    YOB: 1935  Age: 89 y.o.  Sex: female      Subjective:      Jericho Gómez is a 89 y.o. female who is being seen for left distal femur fracture following a fall. Patient seen at bedside with family present. They report the patient went out to the [facility] yesterday and fell while going up the stairs injuring her left leg. The family states she has had several falls in the past, two falls which resulted in wrist fractures one of which underwent surgical fixation by Dr. Ramirez in [place], [place]. The patient is from [place] and receives most of her care there. However, following the fall yesterday the family wanted the patient to go to [facility], in hopes that Dr. Ramirez could do surgery for her mother as he fixed her wrist during a previous fall. While at [facility] ED X-rays were obtained and revealed a distal femoral fracture. It was recommended by Dr. Ramirez the patient be sent to [facility] for a higher level of care. Patient's family wanted the patient to be transported to [place], [place] but unfortunately were unable to. Orthopedics here consulted for further evaluation and management.     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 Family states patient lives alone and is independent with her ADLs. She rarely uses a walker but has one at home.    Denies N/V, fever, chills, chest pain, abdominal pain, numbness/tingling, or pain elsewhere other than the left leg.     7/21/2024: Postop day 1 from left retrograde intramedullary nail for a distal femur periprosthetic fracture.  Patient had a hemoglobin of 6.1 this morning and underwent transfusion with postop transfusion CBC pending.  Patient's son and daughter-in-law are present at bedside.  The patient is to be toe-touch weightbearing for 6 weeks given the spiral fracture  Facility-Administered Medications   Medication Dose Route Frequency    ipratropium 0.5 mg-albuterol 2.5 mg (DUONEB) nebulizer solution 1 Dose  1 Dose Inhalation Q4H PRN    piperacillin-tazobactam (ZOSYN) 3,375 mg in sodium chloride 0.9 % 50 mL IVPB (mini-bag)  3,375 mg IntraVENous Q8H    0.45 % sodium chloride infusion   IntraVENous Continuous    enoxaparin (LOVENOX) injection 40 mg  40 mg SubCUTAneous Daily    0.9 % sodium chloride infusion   IntraVENous PRN    celecoxib (CELEBREX) capsule 100 mg  100 mg Oral BID    atorvastatin (LIPITOR) tablet 40 mg  40 mg Oral Daily    calcium carbonate (TUMS) chewable tablet 500 mg  500 mg Oral BID    DULoxetine (CYMBALTA) extended release capsule 20 mg  20 mg Oral Daily    fentaNYL (DURAGESIC) 50 MCG/HR 1 patch  1 patch TransDERmal Q72H    gabapentin (NEURONTIN) capsule 100 mg  100 mg Oral TID    levothyroxine (SYNTHROID) tablet 50 mcg  50 mcg Oral Daily    pantoprazole (PROTONIX) tablet 20 mg  20 mg Oral Daily    rOPINIRole (REQUIP XL) extended release tablet 12 mg  12 mg Oral Daily    sodium chloride flush 0.9 % injection 5-40 mL  5-40 mL IntraVENous 2 times per day    sodium chloride flush 0.9 % injection 5-40 mL  5-40 mL IntraVENous PRN    0.9 % sodium chloride infusion   IntraVENous PRN    potassium chloride (KLOR-CON M) extended release tablet 40 mEq  40 mEq Oral PRN    Or    potassium bicarb-citric acid (EFFER-K) effervescent tablet 40 mEq  40 mEq Oral PRN    Or    potassium chloride 10 mEq/100 mL IVPB (Peripheral Line)  10 mEq IntraVENous PRN    magnesium sulfate 2000 mg in 50 mL IVPB premix  2,000 mg IntraVENous PRN    ondansetron (ZOFRAN-ODT) disintegrating tablet 4 mg  4 mg Oral Q8H PRN    Or    ondansetron (ZOFRAN) injection 4 mg  4 mg IntraVENous Q6H PRN    polyethylene glycol (GLYCOLAX) packet 17 g  17 g Oral Daily    acetaminophen (TYLENOL) tablet 1,000 mg  1,000 mg Oral TID      Vitals:    07/25/24 1045 07/25/24 1615 07/25/24 2030 07/26/24 0300   BP:  125/63  (!) 134/58 136/60   Pulse:  73 77 72   Resp:  15 16 18   Temp:  97.7 °F (36.5 °C) 98 °F (36.7 °C) 98.1 °F (36.7 °C)   TempSrc:  Oral Oral Oral   SpO2: 94% 93% 94% 91%   Weight:       Height:            Alert and oriented x3, No apparent distress    Physical Exam:  Left lower extremity: Sterile dressings in place as well as Polar Care machine.  No breakthrough present.  There is ecchymosis about her mid thigh but the thigh compartments are supple. No tenderness to palpation of the calf. Patient able to move her ankle without difficulty and reports normal sensation.    Labs:  CBC:  Recent Labs     07/24/24  0836 07/25/24  1052 07/26/24  0801   WBC 17.0* 17.6* 14.3*   RBC 2.65* 2.70* 2.54*   HGB 7.6* 7.7* 7.3*   HCT 23.5* 24.4* 23.0*   MCV 88.7 90.4 90.6   RDW 15.9* 16.8* 16.9*    299 285       CHEMISTRIES:  Recent Labs     07/24/24  0836 07/25/24  1052 07/26/24  0801    141 142   K 3.5 3.5 2.9*   * 110* 111*   CO2 22 22 22   BUN 21* 17 14   GLUCOSE 85 110* 100     PT/INR:  No results for input(s): \"PROTIME\", \"INR\" in the last 72 hours.    APTT:  No results for input(s): \"APTT\" in the last 72 hours.    LIVER PROFILE:  No results for input(s): \"AST\", \"ALT\", \"BILIDIR\", \"BILITOT\", \"ALKPHOS\" in the last 72 hours.      IMAGING:  Postoperative radiographs demonstrate retrograde intramedullary nail without complication.  The spiral fracture is minimally displaced.    Assessment/Plan:   89-year-old female postop day 6 from left distal femur periprosthetic fracture treated with retrograde intramedullary nail. Hgb 7.3 today. Continue PT/OT. Orthopedically stable otherwise and cleared for discharge when medically appropriate.    DVT Proph: Lovenox 40mg daily, per medicine can restart; patient will need to be on DVT prophylaxis for approximately 30 days.   Follow-Up: 2 to 3 weeks  Incentive spirometry   SCDs bilaterally   Radiographs at Follow-Up: Left femur 2 view  Weight Bearing Status: Given patient is  weakness okay for her to advance to weightbearing as tolerated.  Return to work: Not applicable     Dr. Calle is aware and agrees with the above plan  Signed By: Yazmin Calderon PA-C     July 26, 2024

## 2024-07-26 NOTE — PLAN OF CARE
Speech LAnguage Pathology Dysphagia TREATMENT    Patient: Jericho Gómez (89 y.o. 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]  Closed fracture of femur, unspecified fracture morphology, unspecified laterality, unspecified portion of femur, initial encounter ([facility]) [S72.90XA]  Procedure(s) (LRB):  FEMUR INTRAMEDULLARY NAIL RETROGRADE, periprosthetic fracture (Left) 6 Days Post-Op   Precautions: aspiration General Precautions, Weight Bearing   Left Lower Extremity Weight Bearing: Toe Touch Weight Bearing              Time In: 1010  Time Out: 1028    DIET RECOMMENDATIONS: Easy to chew and mildly thick liquids    SWALLOW SAFETY PRECAUTIONS: 1:1 assistance with ALL PO intake, STRICT aspiration and GERD precautions, monitor pt closely for s/s aspiration, meds as tolerated, FEED ONLY IF AWAKE AND ALERT.      ASSESSMENT :  Based on the objective data described below, the patient presents with concerns for aspiration/silent aspiration.  Per records review and discussion w/ MD:  White cell count persistently elevated.  She has remained afebrile.This morning she was noted to have labored breathing.  She was also noted to have become hypoxic with O2 saturation dipping down to 89% on room air.  Chest x-ray concerning for infiltrate for which she was initiated on Zosyn for possible aspiration pneumonia.    Patient continues w/ limited oral intake. Observed patient taking pills whole one at time w/ thin liquids at bedside. Swallow initiated w/ minimal delay. Pharyngeal response adequate to digital palpation. Slightly audible swallow and no clinical indicators of penetration or aspiration. Advised patient to try medications in applesauce in which she liked and preferred. W/ continued thin liquid trials, swallow initiation continued w/ minimal delay. There was x1 delayed throat clear after the swallow. W/ mildly thick liquids,  swallow function same as thin liquids and delayed throat clear was also noted. Patient did report easier swallow w/ mildly thick.     Due to aspiration concerns, will downgrade diet to mildly thick liquids. Recommend MBS to further assess swallow function and r/o aspiration. Plan for MBS Monday 7/29/24. This was discussed w/ MD. Discussed swallowing concerns and aspiration w/ patient's family. At this time, decline in swallow function may be s/t AMS and generalized weakness.     Patient will benefit from skilled intervention to address the above impairments.    GOALS:    Problem: SLP Adult - Impaired Swallowing  Goal: By Discharge: Advance to least restrictive diet without signs or symptoms of aspiration for planned discharge setting.  See evaluation for individualized goals.  Description: Speech Therapy Swallow Goals  Initiated 7/24/24  -Patient will tolerate ETC diet with thin liquids without clinical indicators of aspiration given min cues within 7 day(s).        -Patient will tolerate PO trials without clinical indicators of aspiration given min cues within 7 day(s).      -Patient will participate in modified barium swallow study within 7 day(s).      -Patient will demonstrate understanding of swallow safety precautions and aspiration precautions, diet recs with min cues within 7day(s).    -Patient/caregiver goal: To go home   Outcome: Progressing          PLAN :  Recommendations and Planned Interventions:  Diet: Easy to chew and mildly thick liquids  Aspiration precautions.   Small single sips from cup. Try to avoid straws   MBS planned for Monday      Acute SLP Services: Yes, patient will be followed by speech-language pathology 3x/week to address goals. Patient's rehabilitation potential is considered to be Good.    Discharge Recommendations: To Be Determined       Patient seen at bedside.   Family present  OBJECTIVE:     Past Medical History:   Diagnosis Date    Age-related osteoporosis without current  pathological fracture     Aspiration pneumonia (HCC) 03/27/2022    B12 deficiency 05/10/2011    Essential hypertension     Hypothyroidism (acquired) 09/26/2011    Influenza A (H1N1) 03/27/2022    Mixed hyperlipidemia     Nonrheumatic aortic valve stenosis 02/28/2019    Paroxysmal atrial fibrillation (HCC) 03/27/2022    Formatting of this note might be different from the original.   Formatting of this note might be different from the original.   Takes Eliquis and Lopressor  Formatting of this note might be different from the original.   Takes Eliquis and Lopressor    Spinal stenosis of lumbar region with neurogenic claudication 01/28/2015     Past Surgical History:   Procedure Laterality Date    CHOLECYSTECTOMY      FERTILITY SURGERY Left 7/20/2024    FEMUR INTRAMEDULLARY NAIL RETROGRADE, periprosthetic fracture performed by Beto Martini MD at [facility] MA[facility] OR    HYSTERECTOMY (CERVIX STATUS UNKNOWN)      WRIST CLOSED REDUCTION Left 11/6/2023    LEFT WRIST CLOSED REDUCTION AND PINNING performed by Elijah Ramirez MD at [facility] MAIN OR     Prior Level of Function/Home Situation:   Social/Functional History  Lives With: Alone  Type of Home: Facility  Home Layout: One level  Home Equipment: Rollator  ADL Assistance: Needs assistance  Toileting: Needs assistance  Homemaking Assistance: Needs assistance  Ambulation Assistance: Needs assistance  Transfer Assistance: Needs assistance    Cognitive and Communication Status:  Neurologic State: Alert and Confused  Orientation Level: Oriented to person and Oriented to place  Cognition: Follows commands and Decreased attention/concentration       After Treatment:  Patient left in no apparent distress in bed, Call bell left within reach, Nursing notified, and Caregiver present     Pain:  VAS (numerical) 0    COMMUNICATION/EDUCATION:   Swallow safety precautions, Aspiration precautions, Diet recommendations, and MBS recommendations education provided to Patient and Family via explanation,

## 2024-07-27 LAB
ANION GAP SERPL CALC-SCNC: 7 MMOL/L (ref 5–15)
BUN SERPL-MCNC: 13 MG/DL (ref 6–20)
BUN/CREAT SERPL: 23 (ref 12–20)
CA-I BLD-MCNC: 8.3 MG/DL (ref 8.5–10.1)
CHLORIDE SERPL-SCNC: 112 MMOL/L (ref 97–108)
CO2 SERPL-SCNC: 23 MMOL/L (ref 21–32)
CREAT SERPL-MCNC: 0.57 MG/DL (ref 0.55–1.02)
ERYTHROCYTE [DISTWIDTH] IN BLOOD BY AUTOMATED COUNT: 17.8 % (ref 11.5–14.5)
GLUCOSE SERPL-MCNC: 106 MG/DL (ref 65–100)
HCT VFR BLD AUTO: 24 % (ref 35–47)
HGB BLD-MCNC: 7.5 G/DL (ref 11.5–16)
MCH RBC QN AUTO: 28.5 PG (ref 26–34)
MCHC RBC AUTO-ENTMCNC: 31.3 G/DL (ref 30–36.5)
MCV RBC AUTO: 91.3 FL (ref 80–99)
NRBC # BLD: 0.07 K/UL (ref 0–0.01)
NRBC BLD-RTO: 0.6 PER 100 WBC
PLATELET # BLD AUTO: 271 K/UL (ref 150–400)
PMV BLD AUTO: 10.2 FL (ref 8.9–12.9)
POTASSIUM SERPL-SCNC: 3.4 MMOL/L (ref 3.5–5.1)
RBC # BLD AUTO: 2.63 M/UL (ref 3.8–5.2)
SODIUM SERPL-SCNC: 142 MMOL/L (ref 136–145)
WBC # BLD AUTO: 11.9 K/UL (ref 3.6–11)

## 2024-07-27 PROCEDURE — 2580000003 HC RX 258: Performed by: HOSPITALIST

## 2024-07-27 PROCEDURE — 94761 N-INVAS EAR/PLS OXIMETRY MLT: CPT

## 2024-07-27 PROCEDURE — 97530 THERAPEUTIC ACTIVITIES: CPT

## 2024-07-27 PROCEDURE — 6360000002 HC RX W HCPCS: Performed by: HOSPITALIST

## 2024-07-27 PROCEDURE — 80048 BASIC METABOLIC PNL TOTAL CA: CPT

## 2024-07-27 PROCEDURE — 36415 COLL VENOUS BLD VENIPUNCTURE: CPT

## 2024-07-27 PROCEDURE — 6370000000 HC RX 637 (ALT 250 FOR IP): Performed by: HOSPITALIST

## 2024-07-27 PROCEDURE — 85027 COMPLETE CBC AUTOMATED: CPT

## 2024-07-27 PROCEDURE — 1100000000 HC RM PRIVATE

## 2024-07-27 PROCEDURE — 6370000000 HC RX 637 (ALT 250 FOR IP): Performed by: ORTHOPAEDIC SURGERY

## 2024-07-27 RX ORDER — FERROUS SULFATE 325(65) MG
325 TABLET ORAL 2 TIMES DAILY WITH MEALS
Status: DISCONTINUED | OUTPATIENT
Start: 2024-07-27 | End: 2024-07-30 | Stop reason: HOSPADM

## 2024-07-27 RX ORDER — FUROSEMIDE 10 MG/ML
20 INJECTION INTRAMUSCULAR; INTRAVENOUS ONCE
Status: COMPLETED | OUTPATIENT
Start: 2024-07-27 | End: 2024-07-27

## 2024-07-27 RX ADMIN — PIPERACILLIN AND TAZOBACTAM 3375 MG: 3; .375 INJECTION, POWDER, LYOPHILIZED, FOR SOLUTION INTRAVENOUS at 18:21

## 2024-07-27 RX ADMIN — GABAPENTIN 100 MG: 100 CAPSULE ORAL at 21:18

## 2024-07-27 RX ADMIN — ACETAMINOPHEN 1000 MG: 500 TABLET ORAL at 14:53

## 2024-07-27 RX ADMIN — ROPINIROLE 12 MG: 2 TABLET, FILM COATED, EXTENDED RELEASE ORAL at 11:00

## 2024-07-27 RX ADMIN — GABAPENTIN 100 MG: 100 CAPSULE ORAL at 14:53

## 2024-07-27 RX ADMIN — PIPERACILLIN AND TAZOBACTAM 3375 MG: 3; .375 INJECTION, POWDER, LYOPHILIZED, FOR SOLUTION INTRAVENOUS at 10:14

## 2024-07-27 RX ADMIN — ACETAMINOPHEN 1000 MG: 500 TABLET ORAL at 10:10

## 2024-07-27 RX ADMIN — DULOXETINE HYDROCHLORIDE 20 MG: 20 CAPSULE, DELAYED RELEASE ORAL at 10:14

## 2024-07-27 RX ADMIN — GABAPENTIN 100 MG: 100 CAPSULE ORAL at 10:10

## 2024-07-27 RX ADMIN — CALCIUM CARBONATE 500 MG: 500 TABLET, CHEWABLE ORAL at 10:13

## 2024-07-27 RX ADMIN — CALCIUM CARBONATE 500 MG: 500 TABLET, CHEWABLE ORAL at 21:18

## 2024-07-27 RX ADMIN — PANTOPRAZOLE SODIUM 20 MG: 20 TABLET, DELAYED RELEASE ORAL at 10:10

## 2024-07-27 RX ADMIN — ENOXAPARIN SODIUM 40 MG: 100 INJECTION SUBCUTANEOUS at 16:57

## 2024-07-27 RX ADMIN — FUROSEMIDE 20 MG: 10 INJECTION, SOLUTION INTRAMUSCULAR; INTRAVENOUS at 14:48

## 2024-07-27 RX ADMIN — LEVOTHYROXINE SODIUM 50 MCG: 0.03 TABLET ORAL at 06:15

## 2024-07-27 RX ADMIN — PIPERACILLIN AND TAZOBACTAM 3375 MG: 3; .375 INJECTION, POWDER, LYOPHILIZED, FOR SOLUTION INTRAVENOUS at 02:34

## 2024-07-27 RX ADMIN — SODIUM CHLORIDE, PRESERVATIVE FREE 10 ML: 5 INJECTION INTRAVENOUS at 21:24

## 2024-07-27 RX ADMIN — SODIUM CHLORIDE, PRESERVATIVE FREE 10 ML: 5 INJECTION INTRAVENOUS at 10:15

## 2024-07-27 RX ADMIN — ACETAMINOPHEN 1000 MG: 500 TABLET ORAL at 21:19

## 2024-07-27 RX ADMIN — CELECOXIB 100 MG: 100 CAPSULE ORAL at 10:14

## 2024-07-27 RX ADMIN — FERROUS SULFATE TAB 325 MG (65 MG ELEMENTAL FE) 325 MG: 325 (65 FE) TAB at 18:20

## 2024-07-27 RX ADMIN — ATORVASTATIN CALCIUM 40 MG: 40 TABLET, FILM COATED ORAL at 10:13

## 2024-07-27 RX ADMIN — POTASSIUM BICARBONATE 40 MEQ: 782 TABLET, EFFERVESCENT ORAL at 10:41

## 2024-07-27 RX ADMIN — CELECOXIB 100 MG: 100 CAPSULE ORAL at 21:17

## 2024-07-27 ASSESSMENT — PAIN SCALES - GENERAL
PAINLEVEL_OUTOF10: 5
PAINLEVEL_OUTOF10: 3

## 2024-07-27 ASSESSMENT — PAIN DESCRIPTION - DESCRIPTORS
DESCRIPTORS: ACHING
DESCRIPTORS: ACHING;THROBBING

## 2024-07-27 ASSESSMENT — PAIN DESCRIPTION - ORIENTATION
ORIENTATION: LEFT
ORIENTATION: LEFT

## 2024-07-27 ASSESSMENT — PAIN DESCRIPTION - LOCATION
LOCATION: LEG
LOCATION: HIP

## 2024-07-27 NOTE — PROGRESS NOTES
.Progress Note (Hospitalist, Internal Medicine)  IDENTIFYING INFORMATION   PATIENT:  Jericho Gómez  MRN:  [id number]  ADMIT DATE: 7/18/2024  TIME OF EVALUATION: 7/27/2024 5:48 PM      HISTORY OF PRESENT ILLNESS   Jericho Gómez is a 89 y.o. female who presents with       SUBJECTIVE   7/27/2024  Mentation continues to improve.  Patient set for modified barium swallow on Monday.  WBC continues to improve.  She has remained afebrile.  Her hemoglobin has remained low but not critical.  Hanging around 7.  I will consider him a transfusion due to the degree of her debilitation and difficulty with ambulation and physical therapy especially in light of her age    7/26/2024  Patient initiated on Zosyn.  She is more alert and more communicative today.  Lung bases sound wet.  IV fluid discontinued.  Patient has a very poor appetite.  White cell count improved overnight.  Review by speech and patient being set up for modified barium swallow evaluation on Monday.    7/25/2024  Comfortably in bed.  Hemoglobin low but stable.  White cell count persistently elevated.  She has remained afebrile.  This morning she was noted to have labored breathing.  She was also noted to have become hypoxic with O2 saturation dipping down to 89% on room air.  Chest x-ray concerning for infiltrate for which she was initiated on Zosyn for possible aspiration pneumonia.    MEDICATIONS   Medications Prior to Admission  Medications Prior to Admission: potassium chloride (KLOR-CON) 10 MEQ extended release tablet,   DULoxetine (CYMBALTA) 20 MG extended release capsule, Take 1 capsule by mouth daily  fentaNYL (DURAGESIC) 50 MCG/HR, Place 1 patch onto the skin every 72 hours.  gabapentin (NEURONTIN) 100 MG capsule, Take 1 capsule by mouth 3 times daily.  atorvastatin (LIPITOR) 40 MG tablet, Take 1 tablet by mouth daily  levothyroxine (SYNTHROID) 50 MCG tablet, Take 1 tablet by mouth Daily  hydroCHLOROthiazide (MICROZIDE) 12.5 MG capsule, Take 1  awake, alert, oriented with appropriate mood and affect  HEENT: Atraumatic, normocephalic, pupils equal equally reactive to light.  Mucous membranes moist  Heart: First and second heart sounds present  Lungs: Air entry adequate  Abdomen: Soft, nontender, nondistended.  Bowel sounds present with regular frequency  Genitals: Deferred  Skin:  Extremities:  pedal pulses palpable with good volume.  CNS: Cranial nerves II to XII intact with no focal neurological deficit.      Lines/Drains/Airways/Wounds:  [unfilled]    LABS AND IMAGING   CBC  [unfilled]    Last 3 Hemoglobin  Lab Results   Component Value Date/Time    HGB 7.5 07/27/2024 06:58 AM    HGB 7.3 07/26/2024 08:01 AM    HGB 7.7 07/25/2024 10:52 AM     Last 3 WBC/ANC  Lab Results   Component Value Date/Time    WBC 11.9 07/27/2024 06:58 AM    WBC 14.3 07/26/2024 08:01 AM    WBC 17.6 07/25/2024 10:52 AM     No components found for: \"GRNLOCTYABS\"  Last 3 Platelets  No results found for: \"PLATELET\"  Chemistry  [unfilled]  [unfilled]  No results found for: \"LDH\"  Coagulation Studies  Lab Results   Component Value Date/Time    INR 1.0 07/18/2024 09:18 PM     Liver Function Studies  Lab Results   Component Value Date/Time    ALT 19 07/19/2024 06:35 AM    AST 25 07/19/2024 06:35 AM    ALKPHOS 72 07/19/2024 06:35 AM    ALBUMIN 3.0 07/19/2024 06:35 AM       Recent Imaging        Relevant labs and imaging reviewed    ASSESSMENT AND PLAN     Assessment:    Aspiration pneumonia  Chest x-ray concerning for infiltrate for which she was initiated on Zosyn for possible aspiration pneumonia.  Patient modified barium swallow evaluation on Monday  White cell count improved.  She has remained afebrile    Subtrochanteric fracture left femur  Status post surgical repair, patient is stable  Pending discharge to [facility]    Hypokalemia  Resolved.  Supplement and monitor    Acute blood loss anemia, iron deficiency  Initiate iron supplementation  S/p hemotransfusion.  Continue to monitor  H&H.  I will transfuse patient in light of her level of debility with her hemoglobin hanging around 7    Leukocytosis  Most probably due to pneumonia.  White cell count down to 14.3 today.  She has remained afebrile.    Essential hypertension  Continue current antihypertensive regimen    Hypothyroidism    Mixed hyperlipidemia  Paroxysmal atrial fibrillation-she is currently in [facility].  Review of the record shows that she is not on anticoagulation I am not sure why, probably from the risk from her fall    Aortic stenosis no issues          Wiliam [facility] Hospitalist, Internal Medicine  7/27/2024 at 5:48 PM

## 2024-07-27 NOTE — PLAN OF CARE
Problem: Physical Therapy - Adult  Goal: By Discharge: Performs mobility at highest level of function for planned discharge setting.  See evaluation for individualized goals.  Description: FUNCTIONAL STATUS PRIOR TO ADMISSION: Patient was MIN A for ADLs and using a rollator for functional mobility.    HOME SUPPORT PRIOR TO ADMISSION: Patient is from facility--[facility] and needs assist for bathing there.Patient reported she was able to amb.    Physical Therapy Goals  Initiated 7/22/2024  Pt stated goal: Want to go back to [facility]  Pt will be I with LE HEP in 7 days.  Pt will perform bed mobility with Minimal Assist in 7 days.  Pt will perform transfers with Minimal Assist in 7 days.   Pt will amb 20 feet with LRAD safely with Minimal Assist in 7 days.  Pt will verbalize and demonstrate compliance with fall/TTWB  precautions  in 7 days.   Pt will demonstrate improvement in standing/gait balance from poor to fair in 7 days.    Outcome: Progressing            PHYSICAL THERAPY TREATMENT     Patient: Jericho Gómez (89 y.o. 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  Procedure(s) (LRB):  FEMUR INTRAMEDULLARY NAIL RETROGRADE, periprosthetic fracture (Left) 7 Days Post-Op  Precautions: General Precautions, Weight Bearing     Left Lower Extremity Weight Bearing: Toe Touch Weight Bearing                Recommendations for nursing mobility: Out of bed to chair for meals, Encourage HEP in prep for ADLs/mobility; see handout for details, Frequent repositioning to prevent skin breakdown, LE elevation for management of edema, Use of BSC for toileting , AD and gt belt for bed to chair , Assist x2, and TTWB  within reach, Caregiver / family present, and Heels elevated for pressure relief, and nsg updated     COMMUNICATION/COLLABORATION:   The patient’s plan of care was discussed with: Physical therapist and Registered nurse    Patient Education  Education Given To: Patient  Education Provided: Role of Therapy;Plan of Care;Orientation;Fall Prevention Strategies;Home Exercise Program;Precautions;Transfer Training;Energy Conservation;Equipment  Education Method: Demonstration;Verbal;Teach Back  Barriers to Learning: Cognition  Education Outcome: Continued education needed;Verbalized understanding;Demonstrated understanding    Zeina Pierre PTA  Minutes: 45

## 2024-07-27 NOTE — PLAN OF CARE
Problem: Discharge Planning  Goal: Discharge to home or other facility with appropriate resources  Outcome: Progressing  Flowsheets (Taken 7/26/2024 2115)  Discharge to home or other facility with appropriate resources: Identify barriers to discharge with patient and caregiver     Problem: Pain  Goal: Verbalizes/displays adequate comfort level or baseline comfort level  Outcome: Progressing     Problem: Skin/Tissue Integrity  Goal: Absence of new skin breakdown  Description: 1.  Monitor for areas of redness and/or skin breakdown  2.  Assess vascular access sites hourly  3.  Every 4-6 hours minimum:  Change oxygen saturation probe site  4.  Every 4-6 hours:  If on nasal continuous positive airway pressure, respiratory therapy assess nares and determine need for appliance change or resting period.  Outcome: Progressing     Problem: Safety - Adult  Goal: Free from fall injury  Outcome: Progressing     Problem: ABCDS Injury Assessment  Goal: Absence of physical injury  Outcome: Progressing     Problem: Physical Therapy - Adult  Goal: By Discharge: Performs mobility at highest level of function for planned discharge setting.  See evaluation for individualized goals.  Description: FUNCTIONAL STATUS PRIOR TO ADMISSION: Patient was MIN A for ADLs and using a rollator for functional mobility.    HOME SUPPORT PRIOR TO ADMISSION: Patient is from facility--[facility] and needs assist for bathing there.Patient reported she was able to amb.    Physical Therapy Goals  Initiated 7/22/2024  Pt stated goal: Want to go back to [facility]  Pt will be I with LE HEP in 7 days.  Pt will perform bed mobility with Minimal Assist in 7 days.  Pt will perform transfers with Minimal Assist in 7 days.   Pt will amb 20 feet with LRAD safely with Minimal Assist in 7 days.  Pt will verbalize and demonstrate compliance with fall/TTWB  precautions  in 7 days.   Pt will demonstrate improvement in standing/gait balance from poor to fair in 7 days.    7/26/2024

## 2024-07-28 LAB
ANION GAP SERPL CALC-SCNC: 6 MMOL/L (ref 5–15)
BUN SERPL-MCNC: 14 MG/DL (ref 6–20)
BUN/CREAT SERPL: 23 (ref 12–20)
CA-I BLD-MCNC: 8.7 MG/DL (ref 8.5–10.1)
CHLORIDE SERPL-SCNC: 108 MMOL/L (ref 97–108)
CO2 SERPL-SCNC: 26 MMOL/L (ref 21–32)
CREAT SERPL-MCNC: 0.6 MG/DL (ref 0.55–1.02)
ERYTHROCYTE [DISTWIDTH] IN BLOOD BY AUTOMATED COUNT: 18.6 % (ref 11.5–14.5)
GLUCOSE SERPL-MCNC: 99 MG/DL (ref 65–100)
HCT VFR BLD AUTO: 27 % (ref 35–47)
HGB BLD-MCNC: 8.7 G/DL (ref 11.5–16)
MCH RBC QN AUTO: 29 PG (ref 26–34)
MCHC RBC AUTO-ENTMCNC: 32.2 G/DL (ref 30–36.5)
MCV RBC AUTO: 90 FL (ref 80–99)
NRBC # BLD: 0.07 K/UL (ref 0–0.01)
NRBC BLD-RTO: 0.6 PER 100 WBC
PLATELET # BLD AUTO: 308 K/UL (ref 150–400)
PMV BLD AUTO: 8.7 FL (ref 8.9–12.9)
POTASSIUM SERPL-SCNC: 3.8 MMOL/L (ref 3.5–5.1)
RBC # BLD AUTO: 3 M/UL (ref 3.8–5.2)
SODIUM SERPL-SCNC: 140 MMOL/L (ref 136–145)
WBC # BLD AUTO: 11.6 K/UL (ref 3.6–11)

## 2024-07-28 PROCEDURE — 2580000003 HC RX 258: Performed by: HOSPITALIST

## 2024-07-28 PROCEDURE — 36415 COLL VENOUS BLD VENIPUNCTURE: CPT

## 2024-07-28 PROCEDURE — 1100000000 HC RM PRIVATE

## 2024-07-28 PROCEDURE — 85027 COMPLETE CBC AUTOMATED: CPT

## 2024-07-28 PROCEDURE — 97110 THERAPEUTIC EXERCISES: CPT

## 2024-07-28 PROCEDURE — 6370000000 HC RX 637 (ALT 250 FOR IP): Performed by: HOSPITALIST

## 2024-07-28 PROCEDURE — 6370000000 HC RX 637 (ALT 250 FOR IP): Performed by: ORTHOPAEDIC SURGERY

## 2024-07-28 PROCEDURE — 6360000002 HC RX W HCPCS: Performed by: HOSPITALIST

## 2024-07-28 PROCEDURE — 80048 BASIC METABOLIC PNL TOTAL CA: CPT

## 2024-07-28 RX ORDER — FUROSEMIDE 10 MG/ML
20 INJECTION INTRAMUSCULAR; INTRAVENOUS 2 TIMES DAILY
Status: COMPLETED | OUTPATIENT
Start: 2024-07-28 | End: 2024-07-29

## 2024-07-28 RX ORDER — POLYETHYLENE GLYCOL 3350 17 G/17G
17 POWDER, FOR SOLUTION ORAL DAILY PRN
Status: DISCONTINUED | OUTPATIENT
Start: 2024-07-28 | End: 2024-07-30 | Stop reason: HOSPADM

## 2024-07-28 RX ADMIN — PANTOPRAZOLE SODIUM 20 MG: 20 TABLET, DELAYED RELEASE ORAL at 08:56

## 2024-07-28 RX ADMIN — GABAPENTIN 100 MG: 100 CAPSULE ORAL at 08:35

## 2024-07-28 RX ADMIN — CALCIUM CARBONATE 500 MG: 500 TABLET, CHEWABLE ORAL at 08:35

## 2024-07-28 RX ADMIN — FUROSEMIDE 20 MG: 10 INJECTION, SOLUTION INTRAMUSCULAR; INTRAVENOUS at 13:04

## 2024-07-28 RX ADMIN — POLYETHYLENE GLYCOL 3350 17 G: 17 POWDER, FOR SOLUTION ORAL at 09:02

## 2024-07-28 RX ADMIN — ACETAMINOPHEN 1000 MG: 500 TABLET ORAL at 14:32

## 2024-07-28 RX ADMIN — CALCIUM CARBONATE 500 MG: 500 TABLET, CHEWABLE ORAL at 20:31

## 2024-07-28 RX ADMIN — GABAPENTIN 100 MG: 100 CAPSULE ORAL at 14:32

## 2024-07-28 RX ADMIN — ENOXAPARIN SODIUM 40 MG: 100 INJECTION SUBCUTANEOUS at 17:09

## 2024-07-28 RX ADMIN — ACETAMINOPHEN 1000 MG: 500 TABLET ORAL at 20:29

## 2024-07-28 RX ADMIN — SODIUM CHLORIDE, PRESERVATIVE FREE 10 ML: 5 INJECTION INTRAVENOUS at 20:38

## 2024-07-28 RX ADMIN — FERROUS SULFATE TAB 325 MG (65 MG ELEMENTAL FE) 325 MG: 325 (65 FE) TAB at 08:34

## 2024-07-28 RX ADMIN — ATORVASTATIN CALCIUM 40 MG: 40 TABLET, FILM COATED ORAL at 08:36

## 2024-07-28 RX ADMIN — DULOXETINE HYDROCHLORIDE 20 MG: 20 CAPSULE, DELAYED RELEASE ORAL at 08:34

## 2024-07-28 RX ADMIN — FUROSEMIDE 20 MG: 10 INJECTION, SOLUTION INTRAMUSCULAR; INTRAVENOUS at 17:10

## 2024-07-28 RX ADMIN — CELECOXIB 100 MG: 100 CAPSULE ORAL at 20:31

## 2024-07-28 RX ADMIN — PIPERACILLIN AND TAZOBACTAM 3375 MG: 3; .375 INJECTION, POWDER, LYOPHILIZED, FOR SOLUTION INTRAVENOUS at 14:32

## 2024-07-28 RX ADMIN — PIPERACILLIN AND TAZOBACTAM 3375 MG: 3; .375 INJECTION, POWDER, LYOPHILIZED, FOR SOLUTION INTRAVENOUS at 05:45

## 2024-07-28 RX ADMIN — ROPINIROLE 12 MG: 2 TABLET, FILM COATED, EXTENDED RELEASE ORAL at 08:36

## 2024-07-28 RX ADMIN — CELECOXIB 100 MG: 100 CAPSULE ORAL at 08:34

## 2024-07-28 RX ADMIN — LEVOTHYROXINE SODIUM 50 MCG: 0.03 TABLET ORAL at 08:33

## 2024-07-28 RX ADMIN — PIPERACILLIN AND TAZOBACTAM 3375 MG: 3; .375 INJECTION, POWDER, LYOPHILIZED, FOR SOLUTION INTRAVENOUS at 20:34

## 2024-07-28 RX ADMIN — ACETAMINOPHEN 1000 MG: 500 TABLET ORAL at 08:35

## 2024-07-28 RX ADMIN — GABAPENTIN 100 MG: 100 CAPSULE ORAL at 20:31

## 2024-07-28 RX ADMIN — SODIUM CHLORIDE, PRESERVATIVE FREE 10 ML: 5 INJECTION INTRAVENOUS at 08:40

## 2024-07-28 RX ADMIN — FERROUS SULFATE TAB 325 MG (65 MG ELEMENTAL FE) 325 MG: 325 (65 FE) TAB at 17:10

## 2024-07-28 ASSESSMENT — PAIN SCALES - GENERAL: PAINLEVEL_OUTOF10: 0

## 2024-07-28 NOTE — PROGRESS NOTES
.Progress Note (Hospitalist, Internal Medicine)  IDENTIFYING INFORMATION   PATIENT:  Jericho Gómez  MRN:  [id number]  ADMIT DATE: 7/18/2024  TIME OF EVALUATION: 7/28/2024 3:35 PM      HISTORY OF PRESENT ILLNESS   Jericho Gómez is a 89 y.o. female who presents with       SUBJECTIVE   7/28/2024  No overnight issues.      7/27/2024  Mentation continues to improve.  Patient set for modified barium swallow on Monday.  WBC continues to improve.  She has remained afebrile.  Her hemoglobin has remained low but not critical.  Hanging around 7.  I will consider him a transfusion due to the degree of her debilitation and difficulty with ambulation and physical therapy especially in light of her age    7/26/2024  Patient initiated on Zosyn.  She is more alert and more communicative today.  Lung bases sound wet.  IV fluid discontinued.  Patient has a very poor appetite.  White cell count improved overnight.  Review by speech and patient being set up for modified barium swallow evaluation on Monday.    7/25/2024  Comfortably in bed.  Hemoglobin low but stable.  White cell count persistently elevated.  She has remained afebrile.  This morning she was noted to have labored breathing.  She was also noted to have become hypoxic with O2 saturation dipping down to 89% on room air.  Chest x-ray concerning for infiltrate for which she was initiated on Zosyn for possible aspiration pneumonia.    MEDICATIONS   Medications Prior to Admission  Medications Prior to Admission: potassium chloride (KLOR-CON) 10 MEQ extended release tablet,   DULoxetine (CYMBALTA) 20 MG extended release capsule, Take 1 capsule by mouth daily  fentaNYL (DURAGESIC) 50 MCG/HR, Place 1 patch onto the skin every 72 hours.  gabapentin (NEURONTIN) 100 MG capsule, Take 1 capsule by mouth 3 times daily.  atorvastatin (LIPITOR) 40 MG tablet, Take 1 tablet by mouth daily  levothyroxine (SYNTHROID) 50 MCG tablet, Take 1 tablet by mouth Daily  hydroCHLOROthiazide  Bhanu JORDAN DO   20 mg at 07/28/24 0834    fentaNYL (DURAGESIC) 50 MCG/HR 1 patch  1 patch TransDERmal Q72H Bhanu Haque DO   1 patch at 07/28/24 0139    gabapentin (NEURONTIN) capsule 100 mg  100 mg Oral TID Bhanu Haque DO   100 mg at 07/28/24 1432    levothyroxine (SYNTHROID) tablet 50 mcg  50 mcg Oral Daily Bhanu Haque DO   50 mcg at 07/28/24 0833    pantoprazole (PROTONIX) tablet 20 mg  20 mg Oral Daily Bhanu Haque DO   20 mg at 07/28/24 0856    rOPINIRole (REQUIP XL) extended release tablet 12 mg  12 mg Oral Daily Bhanu Haque DO   12 mg at 07/28/24 0836    sodium chloride flush 0.9 % injection 5-40 mL  5-40 mL IntraVENous 2 times per day Bhanu Haque DO   10 mL at 07/28/24 0840    sodium chloride flush 0.9 % injection 5-40 mL  5-40 mL IntraVENous PRN Bhanu Haque DO        0.9 % sodium chloride infusion   IntraVENous PRN Bhanu Haque DO        potassium chloride (KLOR-CON M) extended release tablet 40 mEq  40 mEq Oral PRN Bhanu Haque DO        Or    potassium bicarb-citric acid (EFFER-K) effervescent tablet 40 mEq  40 mEq Oral PRN Bhanu Haque DO   40 mEq at 07/27/24 1041    Or    potassium chloride 10 mEq/100 mL IVPB (Peripheral Line)  10 mEq IntraVENous PRN Bhanu Haque  mL/hr at 07/20/24 1812 10 mEq at 07/20/24 1812    magnesium sulfate 2000 mg in 50 mL IVPB premix  2,000 mg IntraVENous PRN Bhanu Haque DO        ondansetron (ZOFRAN-ODT) disintegrating tablet 4 mg  4 mg Oral Q8H PRN Bhanu Haque DO        Or    ondansetron (ZOFRAN) injection 4 mg  4 mg IntraVENous Q6H PRN Bhanu Haque DO   4 mg at 07/23/24 0921    acetaminophen (TYLENOL) tablet 1,000 mg  1,000 mg Oral TID Bhanu Haque DO   1,000 mg at 07/28/24 1432         Allergies  No Known Allergies    REVIEW OF SYSTEMS     Within above limitations. 14 point review of systems reviewed. Pertinent positive or negative as per HPI or otherwise negative per 14 point systems

## 2024-07-28 NOTE — PLAN OF CARE
PHYSICAL THERAPY TREATMENT     Patient: Jericho Gómez (89 y.o. 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  ASSESSMENT  Patient continues with skilled PT services and is progressing towards goals. Pt supine upon PT arrival, agreeable to session. Pt A&O x 4. Pt concerned regarding a sick family member and had just received poor news, pt appeared solemn, but was agreeable to in bed therex only.  (See below for objective details and assist levels).     Overall pt tolerated session fair today with PT. Participated with supine therex. AAROM required for LLE. Reports of increased pain in R thigh with mobility. Increased time required for each exercise with occ encouragement required for continued participation.  Will continue to benefit from skilled PT services, and will continue to progress as tolerated. Potential barriers for safe discharge: pt is a high fall risk and concern for pt safely navigating or managing the home environment. Current PT DC recommendation Skilled Nursing Facility once medically appropriate.      GOALS:    Problem: Physical Therapy - Adult  Goal: By Discharge: Performs mobility at highest level of function for planned discharge setting.   See evaluation for individualized goals.  Description: FUNCTIONAL STATUS PRIOR TO ADMISSION: Patient was MIN A for ADLs and using a rollator for functional mobility.    HOME SUPPORT PRIOR TO ADMISSION: [facility] is [facility] and needs assist for bathing there.Patient reported she was able to amb.    Physical Therapy Goals  Initiated 7/22/2024  Pt stated goal: [facility] to go back to [facility]  Pt will be I with LE HEP in 7 days.  Pt will perform bed mobility with Minimal Assist in 7 days.  Pt will perform transfers with Minimal Assist in 7 days.   Pt will amb 20 feet with LRAD safely with Minimal Assist in 7 days.  Pt will verbalize and demonstrate compliance with fall/TTWB  precautions  in 7 days.   Pt will demonstrate improvement in standing/gait balance from poor to fair in 7 days.    Outcome: Progressing          PLAN :  Patient continues to benefit from skilled intervention to address functional impairments. Continue treatment per established plan of care to address goals.    Recommendation for discharge: (in order for the patient to meet his/her long term goals)  Skilled Nursing Facility    IF patient discharges home will need the following DME:     SUBJECTIVE:   Patient stated “I just got bad news and now I'm worried. My son in law is the hospital and they said he's doing very poorly.”    OBJECTIVE DATA SUMMARY:   Critical Behavior:  Orientation  Orientation Level: Oriented X4  Cognition  Overall Cognitive Status: Exceptions  Arousal/Alertness: Appears intact  Following Commands: Follows one step commands with increased time  Attention Span: Attends with cues to redirect  Initiation: Requires cues for some  Sequencing: Requires cues for some    Functional Mobility Training:  Bed Mobility:     Transfers:     Balance:     Wheelchair Mobility:     Ambulation/Gait Training:                                                      Therapeutic Exercises:   AAROM RLE, AROM LLE. Rest breaks taken after each exercise.  Additional time required to complete.    EXERCISE   Sets   Reps   Active Active Assist   Passive Self ROM   Comments   Ankle Pumps 2 10 [x] [] [] []    Quad Sets/Glut Sets   [] [] [] []    Hamstring Sets   [] [] [] []    Short Arc Quads   [] [] [] []    Heel Slides 2 10 [x] [x] [] []    Straight Leg Raises 2 10 [x] [x] [] []    Hip abd/add 2 10 [x] [x] [] []    Long Arc Quads   [] [] [] []    Marching   [] [] [] []    Seated HR/TR   [] [] [] []       [] [] [] []       Pain Rating:  3/10 RLE with mobility. reported  Pain Intervention(s):       Activity Tolerance:   Fair     After treatment patient left in no apparent distress:   Bed locked and returned to lowest position, Patient left in no apparent distress in bed, Call bell within reach, Bed/ chair alarm activated, and Side rails x3, and nsg updated     COMMUNICATION/COLLABORATION:   The patient’s plan of care was discussed with: Registered nurse    Patient Education  Education Given To: Patient  Education Provided: Role of Therapy;Plan of Care;Home Exercise Program  Education Method: Verbal  Barriers to Learning: None  Education Outcome: Verbalized understanding;Continued education needed    Nsg notified of pt concerns/comments, and of pts request for assistance in contacting family members.     Sahara Harley, PTA  Minutes: 24

## 2024-07-28 NOTE — PLAN OF CARE
Problem: Discharge Planning  Goal: Discharge to home or other facility with appropriate resources  Outcome: Progressing     Problem: Pain  Goal: Verbalizes/displays adequate comfort level or baseline comfort level  Outcome: Progressing     Problem: Skin/Tissue Integrity  Goal: Absence of new skin breakdown  Description: 1.  Monitor for areas of redness and/or skin breakdown  2.  Assess vascular access sites hourly  3.  Every 4-6 hours minimum:  Change oxygen saturation probe site  4.  Every 4-6 hours:  If on nasal continuous positive airway pressure, respiratory therapy assess nares and determine need for appliance change or resting period.  Outcome: Progressing     Problem: Safety - Adult  Goal: Free from fall injury  Outcome: Progressing     Problem: ABCDS Injury Assessment  Goal: Absence of physical injury  Outcome: Progressing     Problem: Confusion  Goal: Confusion, delirium, dementia, or psychosis is improved or at baseline  Description: INTERVENTIONS:  1. Assess for possible contributors to thought disturbance, including medications, impaired vision or hearing, underlying metabolic abnormalities, dehydration, psychiatric diagnoses, and notify attending LIP  2. [place] high risk fall precautions, as indicated  3. Provide frequent short contacts to provide reality reorientation, refocusing and direction  4. Decrease environmental stimuli, including noise as appropriate  5. Monitor and intervene to maintain adequate nutrition, hydration, elimination, sleep and activity  6. If unable to ensure safety without constant attention obtain sitter and review sitter guidelines with assigned personnel  7. Initiate Psychosocial CNS and Spiritual Care consult, as indicated  Outcome: Progressing

## 2024-07-29 ENCOUNTER — APPOINTMENT (OUTPATIENT)
Facility: HOSPITAL | Age: 89
DRG: 480 | End: 2024-07-29
Payer: MEDICARE

## 2024-07-29 LAB
ANION GAP SERPL CALC-SCNC: 6 MMOL/L (ref 5–15)
BUN SERPL-MCNC: 13 MG/DL (ref 6–20)
BUN/CREAT SERPL: 20 (ref 12–20)
CA-I BLD-MCNC: 8.4 MG/DL (ref 8.5–10.1)
CHLORIDE SERPL-SCNC: 105 MMOL/L (ref 97–108)
CO2 SERPL-SCNC: 29 MMOL/L (ref 21–32)
CREAT SERPL-MCNC: 0.66 MG/DL (ref 0.55–1.02)
ERYTHROCYTE [DISTWIDTH] IN BLOOD BY AUTOMATED COUNT: 19.2 % (ref 11.5–14.5)
GLUCOSE SERPL-MCNC: 86 MG/DL (ref 65–100)
HCT VFR BLD AUTO: 26.4 % (ref 35–47)
HGB BLD-MCNC: 8.5 G/DL (ref 11.5–16)
MCH RBC QN AUTO: 29.1 PG (ref 26–34)
MCHC RBC AUTO-ENTMCNC: 32.2 G/DL (ref 30–36.5)
MCV RBC AUTO: 90.4 FL (ref 80–99)
NRBC # BLD: 0.03 K/UL (ref 0–0.01)
NRBC BLD-RTO: 0.3 PER 100 WBC
PLATELET # BLD AUTO: 293 K/UL (ref 150–400)
PMV BLD AUTO: 8.8 FL (ref 8.9–12.9)
POTASSIUM SERPL-SCNC: 4 MMOL/L (ref 3.5–5.1)
RBC # BLD AUTO: 2.92 M/UL (ref 3.8–5.2)
SODIUM SERPL-SCNC: 140 MMOL/L (ref 136–145)
WBC # BLD AUTO: 10.7 K/UL (ref 3.6–11)

## 2024-07-29 PROCEDURE — 6370000000 HC RX 637 (ALT 250 FOR IP): Performed by: ORTHOPAEDIC SURGERY

## 2024-07-29 PROCEDURE — 97530 THERAPEUTIC ACTIVITIES: CPT

## 2024-07-29 PROCEDURE — 2580000003 HC RX 258: Performed by: HOSPITALIST

## 2024-07-29 PROCEDURE — 6360000002 HC RX W HCPCS: Performed by: HOSPITALIST

## 2024-07-29 PROCEDURE — 6370000000 HC RX 637 (ALT 250 FOR IP): Performed by: HOSPITALIST

## 2024-07-29 PROCEDURE — 85027 COMPLETE CBC AUTOMATED: CPT

## 2024-07-29 PROCEDURE — 2500000003 HC RX 250 WO HCPCS: Performed by: HOSPITALIST

## 2024-07-29 PROCEDURE — 92611 MOTION FLUOROSCOPY/SWALLOW: CPT

## 2024-07-29 PROCEDURE — 80048 BASIC METABOLIC PNL TOTAL CA: CPT

## 2024-07-29 PROCEDURE — 1100000000 HC RM PRIVATE

## 2024-07-29 PROCEDURE — 74230 X-RAY XM SWLNG FUNCJ C+: CPT

## 2024-07-29 PROCEDURE — 36415 COLL VENOUS BLD VENIPUNCTURE: CPT

## 2024-07-29 RX ORDER — MORPHINE SULFATE 2 MG/ML
2 INJECTION, SOLUTION INTRAMUSCULAR; INTRAVENOUS EVERY 4 HOURS PRN
Status: DISCONTINUED | OUTPATIENT
Start: 2024-07-29 | End: 2024-07-29

## 2024-07-29 RX ADMIN — FERROUS SULFATE TAB 325 MG (65 MG ELEMENTAL FE) 325 MG: 325 (65 FE) TAB at 09:22

## 2024-07-29 RX ADMIN — BARIUM SULFATE 10 ML: 400 PASTE ORAL at 13:42

## 2024-07-29 RX ADMIN — PIPERACILLIN AND TAZOBACTAM 3375 MG: 3; .375 INJECTION, POWDER, LYOPHILIZED, FOR SOLUTION INTRAVENOUS at 04:42

## 2024-07-29 RX ADMIN — CELECOXIB 100 MG: 100 CAPSULE ORAL at 20:23

## 2024-07-29 RX ADMIN — FUROSEMIDE 20 MG: 10 INJECTION, SOLUTION INTRAMUSCULAR; INTRAVENOUS at 09:22

## 2024-07-29 RX ADMIN — BARIUM SULFATE 5 ML: 400 SUSPENSION ORAL at 13:41

## 2024-07-29 RX ADMIN — SODIUM CHLORIDE, PRESERVATIVE FREE 10 ML: 5 INJECTION INTRAVENOUS at 20:23

## 2024-07-29 RX ADMIN — FUROSEMIDE 20 MG: 10 INJECTION, SOLUTION INTRAMUSCULAR; INTRAVENOUS at 18:54

## 2024-07-29 RX ADMIN — ACETAMINOPHEN 1000 MG: 500 TABLET ORAL at 09:22

## 2024-07-29 RX ADMIN — ATORVASTATIN CALCIUM 40 MG: 40 TABLET, FILM COATED ORAL at 09:22

## 2024-07-29 RX ADMIN — FERROUS SULFATE TAB 325 MG (65 MG ELEMENTAL FE) 325 MG: 325 (65 FE) TAB at 15:56

## 2024-07-29 RX ADMIN — DULOXETINE HYDROCHLORIDE 20 MG: 20 CAPSULE, DELAYED RELEASE ORAL at 09:21

## 2024-07-29 RX ADMIN — LEVOTHYROXINE SODIUM 50 MCG: 0.03 TABLET ORAL at 09:22

## 2024-07-29 RX ADMIN — CALCIUM CARBONATE 500 MG: 500 TABLET, CHEWABLE ORAL at 20:23

## 2024-07-29 RX ADMIN — BARIUM SULFATE 35 ML: 0.81 POWDER, FOR SUSPENSION ORAL at 13:40

## 2024-07-29 RX ADMIN — PANTOPRAZOLE SODIUM 20 MG: 20 TABLET, DELAYED RELEASE ORAL at 09:22

## 2024-07-29 RX ADMIN — GABAPENTIN 100 MG: 100 CAPSULE ORAL at 09:22

## 2024-07-29 RX ADMIN — SODIUM CHLORIDE, PRESERVATIVE FREE 10 ML: 5 INJECTION INTRAVENOUS at 09:22

## 2024-07-29 RX ADMIN — GABAPENTIN 100 MG: 100 CAPSULE ORAL at 20:23

## 2024-07-29 RX ADMIN — CELECOXIB 100 MG: 100 CAPSULE ORAL at 09:21

## 2024-07-29 RX ADMIN — PIPERACILLIN AND TAZOBACTAM 3375 MG: 3; .375 INJECTION, POWDER, LYOPHILIZED, FOR SOLUTION INTRAVENOUS at 15:57

## 2024-07-29 RX ADMIN — CALCIUM CARBONATE 500 MG: 500 TABLET, CHEWABLE ORAL at 09:21

## 2024-07-29 RX ADMIN — ACETAMINOPHEN 1000 MG: 500 TABLET ORAL at 20:23

## 2024-07-29 RX ADMIN — ACETAMINOPHEN 1000 MG: 500 TABLET ORAL at 15:56

## 2024-07-29 RX ADMIN — GABAPENTIN 100 MG: 100 CAPSULE ORAL at 15:57

## 2024-07-29 RX ADMIN — ROPINIROLE 12 MG: 2 TABLET, FILM COATED, EXTENDED RELEASE ORAL at 09:26

## 2024-07-29 RX ADMIN — PIPERACILLIN AND TAZOBACTAM 3375 MG: 3; .375 INJECTION, POWDER, LYOPHILIZED, FOR SOLUTION INTRAVENOUS at 20:24

## 2024-07-29 RX ADMIN — ENOXAPARIN SODIUM 40 MG: 100 INJECTION SUBCUTANEOUS at 15:56

## 2024-07-29 ASSESSMENT — PAIN SCALES - GENERAL
PAINLEVEL_OUTOF10: 0
PAINLEVEL_OUTOF10: 8
PAINLEVEL_OUTOF10: 0
PAINLEVEL_OUTOF10: 2

## 2024-07-29 ASSESSMENT — PAIN DESCRIPTION - LOCATION
LOCATION: LEG
LOCATION: LEG

## 2024-07-29 ASSESSMENT — PAIN DESCRIPTION - ORIENTATION
ORIENTATION: LEFT
ORIENTATION: RIGHT

## 2024-07-29 ASSESSMENT — PAIN - FUNCTIONAL ASSESSMENT
PAIN_FUNCTIONAL_ASSESSMENT: PREVENTS OR INTERFERES SOME ACTIVE ACTIVITIES AND ADLS
PAIN_FUNCTIONAL_ASSESSMENT: PREVENTS OR INTERFERES SOME ACTIVE ACTIVITIES AND ADLS

## 2024-07-29 ASSESSMENT — PAIN DESCRIPTION - DESCRIPTORS
DESCRIPTORS: ACHING
DESCRIPTORS: ACHING

## 2024-07-29 NOTE — PROGRESS NOTES
Hospitalist Progress Note    NAME:   Jericho Gómez   : 1935   MRN: [id number]     Date/Time: 2024 2:23 PM  Patient PCP: Unknown, Provider, LILI - NP    Estimated discharge date:  Barriers:       Assessment / Plan:    Aspiration pneumonia  Chest x-ray concerning for infiltrate for which she was initiated on Zosyn for possible aspiration pneumonia.  Pending modified barium swallow evaluation on Monday  White cell count improved.  She has remained afebrile     Subtrochanteric fracture left femur  Status post surgical repair, patient is stable  Pending discharge to SNF     Hypokalemia  Resolved.  Supplement and monitor     Acute blood loss anemia, iron deficiency  Initiate iron supplementation  S/p hemotransfusion.  Continue to monitor H&H.     Essential hypertension  Continue current antihypertensive regimen     Hypothyroidism     Mixed hyperlipidemia  Paroxysmal atrial fibrillation-she is currently in RSR.  Review of the record shows that she is not on anticoagulation I am not sure why, probably from the risk from her fall     Aortic stenosis       Medical Decision Making:   I personally reviewed labs:  I personally reviewed imaging:  I personally reviewed EKG:  Toxic drug monitoring:   Discussed case with:     Subjective:     Chief Complaint / Reason for Physician Visit  \" Fall at home\".  Discussed with RN events overnight.      - Admission H&P  89 y.o. female with pmh of HTN and PAF who presented to the hospital from free standing ED w/ an acute femur fracture.  The pt suffered a traumatic mechanical fall while walking up a set of concrete steps.  Post fall immediate pain was described and pt was unable to stand or ambulate.  Pt denied any head injury or LOC and was found to have a femur fxr via Xray.  Pt was hemodynamically stable on arrival and lab work reviewed.        Patient is awake however not very verbal and not alert and oriented.  Denies any chest pain or shortness of  breath.  No overnight fever/chills, chest pain, shortness breath noted.    Currently pending MBS.    Objective:     VITALS:   Last 24hrs VS reviewed since prior progress note. Most recent are:  Patient Vitals for the past 24 hrs:   BP Temp Temp src Pulse Resp SpO2   07/29/24 0737 (!) 173/66 97.9 °F (36.6 °C) Oral 71 16 99 %   07/29/24 0401 (!) 163/65 98.2 °F (36.8 °C) Oral 71 18 95 %   07/28/24 1932 (!) 144/60 98.2 °F (36.8 °C) Oral 74 16 97 %   07/28/24 1511 (!) 147/75 97.9 °F (36.6 °C) Oral 81 15 96 %         Intake/Output Summary (Last 24 hours) at 7/29/2024 1423  Last data filed at 7/29/2024 0842  Gross per 24 hour   Intake 110 ml   Output 2800 ml   Net -2690 ml        I had a face to face encounter and independently examined this patient on 7/29/2024, as outlined below:  PHYSICAL EXAM:  General: Alert, cooperative  EENT:  EOMI. Anicteric sclerae.  Resp:  CTA bilaterally, no wheezing or rales.  No accessory muscle use  CV:  Regular  rhythm,  No edema  GI:  Soft, Non distended, Non tender.  +Bowel sounds  Neurologic:  Alert and oriented X 3, normal speech,   Psych:   Good insight. Not anxious nor agitated  Skin:  No rashes.  No jaundice    Reviewed most current lab test results and cultures  YES  Reviewed most current radiology test results   YES  Review and summation of old records today    NO  Reviewed patient's current orders and MAR    YES  PMH/SH reviewed - no change compared to H&P    Procedures: see electronic medical records for all procedures/Xrays and details which were not copied into this note but were reviewed prior to creation of Plan.      LABS:  I reviewed today's most current labs and imaging studies.  Pertinent labs include:  Recent Labs     07/27/24  0658 07/28/24  0844 07/29/24  0459   WBC 11.9* 11.6* 10.7   HGB 7.5* 8.7* 8.5*   HCT 24.0* 27.0* 26.4*    308 293     Recent Labs     07/27/24  0658 07/28/24  0844 07/29/24  0459    140 140   K 3.4* 3.8 4.0   * 108 105   CO2 23  26 29   GLUCOSE 106* 99 86   BUN 13 14 13   CREATININE 0.57 0.60 0.66   CALCIUM 8.3* 8.7 8.4*       Signed: DEANDRA LEWIS MD

## 2024-07-29 NOTE — PLAN OF CARE
PHYSICAL THERAPY TREATMENT     Patient: Jericho Gómez (89 y.o. 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  Procedure(s) (LRB):  FEMUR INTRAMEDULLARY NAIL RETROGRADE, periprosthetic fracture (Left) 9 Days Post-Op  Precautions: General Precautions, Weight Bearing     Left Lower Extremity Weight Bearing: Toe Touch Weight Bearing                Recommendations for nursing mobility: Out of bed to chair for meals, Encourage HEP in prep for ADLs/mobility; see handout for details, Frequent repositioning to prevent skin breakdown, Use of bed/chair alarm for safety, LE elevation for management of edema, Use of BSC for toileting , AD and gt belt for bed to chair , and Assist x2    In place during session: None  Chart, physical therapy assessment, plan of care and goals were reviewed.  ASSESSMENT  Patient continues with skilled PT services and is slowly progressing towards goals. Pt received semi supine on bed upon PT arrival, agreeable to session. Pt A&O x 4. (See below for objective details and assist levels).     Overall pt tolerated session fair today with c/o pain at L thigh - 8/10. Pt requires additional time, modAx 1-2, and cues for bed mobility. Completed STS and bed<>chair transfers with use of RW, modAx 2, cues for hand placement and sequencing. Pt able to take few shuffled steps towards the chair - 2' with modAx 2, require reminders to maintain TTWB effectively, cues for weight shifting and sequencing. Will continue to benefit from skilled PT services, and will continue to progress as tolerated. Potential barriers for safe discharge: pt has poor safety awareness, pt is a high  Judgement: Decreased awareness of need for assistance;Decreased awareness of need for safety  Problem Solving: Decreased awareness of errors;Assistance required to correct errors made;Assistance required to implement solutions;Assistance required to identify errors made;Assistance required to generate solutions  Insights: Decreased awareness of deficits  Initiation: Requires cues for some  Sequencing: Requires cues for some    Functional Mobility Training:  Bed Mobility:  Bed Mobility Training  Bed Mobility Training: Yes  Interventions: Verbal cues;Safety awareness training;Manual cues;Tactile cues  Rolling: Moderate assistance;Additional time  Supine to Sit: Moderate assistance;Assist X2  Scooting: Contact-guard assistance  Transfers:  Transfer Training  Transfer Training: Yes  Interventions: Verbal cues;Safety awareness training;Tactile cues;Manual cues  Sit to Stand: Moderate assistance;Assist X2;Adaptive equipment;Additional time  Stand to Sit: Moderate assistance;Assist X2;Adaptive equipment  Bed to Chair: Moderate assistance;Adaptive equipment;Assist X2  Balance:  Balance  Sitting: Intact  Standing: Impaired  Standing - Static: Fair;Constant support  Standing - Dynamic: Poor;Constant support  Wheelchair Mobility:  Wheelchair Management  Wheelchair Management: No  Ambulation/Gait Training:    Left Side Weight Bearing: Toe touch    Gait  Gait Training: Yes  Left Side Weight Bearing: Toe touch  Overall Level of Assistance: Moderate assistance;Assist X2;Additional time  Distance (ft): 2 Feet  Assistive Device: Gait belt;Walker, rolling  Interventions: Verbal cues;Demonstration;Safety awareness training;Tactile cues  Base of Support: Narrowed  Speed/Cherelle: Shuffled  Step Length: Right shortened  Swing Pattern: Left asymmetrical  Stance: Left decreased  Gait Abnormalities: Antalgic;Shuffling gait    Therapeutic Exercises:       EXERCISE   Sets   Reps   Active Active Assist   Passive Self ROM   Comments   Ankle

## 2024-07-29 NOTE — PLAN OF CARE
Problem: Discharge Planning  Goal: Discharge to home or other facility with appropriate resources  Outcome: Progressing     Problem: Pain  Goal: Verbalizes/displays adequate comfort level or baseline comfort level  Outcome: Progressing     Problem: Skin/Tissue Integrity  Goal: Absence of new skin breakdown  Description: 1.  Monitor for areas of redness and/or skin breakdown  2.  Assess vascular access sites hourly  3.  Every 4-6 hours minimum:  Change oxygen saturation probe site  4.  Every 4-6 hours:  If on nasal continuous positive airway pressure, respiratory therapy assess nares and determine need for appliance change or resting period.  Outcome: Progressing     Problem: Safety - Adult  Goal: Free from fall injury  Outcome: Progressing     Problem: ABCDS Injury Assessment  Goal: Absence of physical injury  Outcome: Progressing     Problem: Physical Therapy - Adult  Goal: By Discharge: Performs mobility at highest level of function for planned discharge setting.  See evaluation for individualized goals.  Description: FUNCTIONAL STATUS PRIOR TO ADMISSION: Patient was MIN A for ADLs and using a rollator for functional mobility.    HOME SUPPORT PRIOR TO ADMISSION: Patient is from facility--[facility] and needs assist for bathing there.Patient reported she was able to amb.    Physical Therapy Goals  Initiated 7/22/2024  Pt stated goal: Want to go back to [facility]  Pt will be I with LE HEP in 7 days.  Pt will perform bed mobility with Minimal Assist in 7 days.  Pt will perform transfers with Minimal Assist in 7 days.   Pt will amb 20 feet with LRAD safely with Minimal Assist in 7 days.  Pt will verbalize and demonstrate compliance with fall/TTWB  precautions  in 7 days.   Pt will demonstrate improvement in standing/gait balance from poor to fair in 7 days.    7/28/2024 1303 by Sahara Harley, MALIK  Outcome: Progressing     Problem: Confusion  Goal: Confusion, delirium, dementia, or psychosis is improved or at

## 2024-07-29 NOTE — PLAN OF CARE
SPEECH PATHOLOGY MODIFIED BARIUM SWALLOW STUDY  Patient: Jericho Gómez (89 y.o. 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]  Closed fracture of femur, unspecified fracture morphology, unspecified laterality, unspecified portion of femur, initial encounter ([facility]) [S72.90XA]  Procedure(s) (LRB):  FEMUR INTRAMEDULLARY NAIL RETROGRADE, periprosthetic fracture (Left) 9 Days Post-Op   Precautions: aspiration  General Precautions, Weight Bearing   Left Lower Extremity Weight Bearing: Toe Touch Weight Bearing              DIET RECOMMENDATIONS:Easy to chew and thin liquids    SWALLOW SAFETY PRECAUTIONS: 1:1 assistance with ALL PO intake, STRICT aspiration and GERD precautions, monitor pt closely for s/s aspiration, meds whole with applesauce or pudding, FEED ONLY IF AWAKE AND ALERT.      ASSESSMENT :  Based on the objective data described below, the patient presents with minimal oropharyngeal dysphagia c/b generalized weakness and swallow delay. Trace penetration of thin w/ larger volumes w/o aspiration observed only.    Oral phase: prolonged oral transit more pronounced w/ thicker consistencies. Oral bolus fragmentation w/ thicker consistencies. Reduced tongue base retraction. Minimal swallow delay.    Pharyngeal phase: hyolaryngeal excursion and protraction adequate. Epiglottis slow to invert w/ delayed recoil. No penetration or aspiration is observed w/ tsp and small volume thin. W/ thin via cup there is trace penetration during the swallow. Residual trace undercoating of the epiglottis remains. There is no penetration or aspiration observed w/ use of straw. No penetration or aspiration observed w/ remaining consistencies.   No significant pharyngeal residue.      UES: appears WFL.       Patient will benefit from skilled intervention to address the above impairments.    GOALS:  Speech Therapy Swallow Goals  Initiated

## 2024-07-29 NOTE — PROGRESS NOTES
OCCUPATIONAL THERAPY TREATMENT  Patient: Jericho Gómez (89 y.o. 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]  Closed fracture of femur, unspecified fracture morphology, unspecified laterality, unspecified portion of femur, initial encounter ([facility]) [S72.90XA]  Procedure(s) (LRB):  FEMUR INTRAMEDULLARY NAIL RETROGRADE, periprosthetic fracture (Left) 9 Days Post-Op   Precautions: General Precautions, Weight Bearing   Left Lower Extremity Weight Bearing: Toe Touch Weight Bearing            Recommendations for nursing mobility: Out of bed to chair for meals, Encourage HEP in prep for ADLs/mobility; see handout for details, Frequent repositioning to prevent skin breakdown, Use of BSC for toileting , AD and gt belt for bed to chair , and Assist x2    In place during session: None  Chart, occupational therapy assessment, plan of care, and goals were reviewed.  ASSESSMENT  Patient continues with skilled OT services and is slowly progressing towards goals. Pt presented semi supine upon GUTIERREZ/PT arrival, agreeable to session. Pt A&O x 4. Pt required increase time to process simple commands.  Pt also required verbal and tactile cues for proper hand and foot placement during sit <>stands.  Pt demonstrated improvement with following TTWB L LE for bed to chair transfer.  Activity completed in prep for commode transfers. Pt demonstrated difficulty donning clean hospital gown,  Pt had L arm in sleeve but when given cues to pull up to her shoulder, she started pulling on bottom of gown.  Pt required encouragement to stay uup in recliner at end of session.  Pt encouraged to sit up until after dinner (~2 hours). Pt left with all needs met. (See below for objective details and assist levels).     Overall pt tolerated session fair today with rest breaks required.   Potential barriers for safe discharge: pt lives alone, pts current  following DME: continuing to assess with progress     SUBJECTIVE:   Patient stated “I'm exhausted.” pt requesting to return to bed at [facility]      OBJECTIVE DATA SUMMARY:   Cognitive/Behavioral Status:  Orientation  Orientation Level: Oriented X4  Cognition  Overall Cognitive Status: Exceptions  Arousal/Alertness: Appears intact  Following Commands: Follows one step commands with increased time;Follows one step commands with repetition  Attention Span: Attends with cues to redirect  Memory: Impaired  Safety Judgement: Decreased awareness of need for assistance;Decreased awareness of need for safety  Problem Solving: Decreased awareness of errors;Assistance required to correct errors made;Assistance required to implement solutions;Assistance required to identify errors made;Assistance required to generate solutions  Insights: Decreased awareness of deficits  Initiation: Requires cues for some  Sequencing: Requires cues for some    Functional Mobility and Transfers for ADLs:  Bed Mobility:  Bed Mobility Training  Bed Mobility Training: Yes  Interventions: Verbal cues;Safety awareness training;Manual cues;Tactile cues  Rolling: Moderate assistance;Additional time  Supine to Sit: Moderate assistance;Assist X2  Scooting: Contact-guard assistance    Transfers:  Transfer Training  Transfer Training: Yes  Interventions: Verbal cues;Safety awareness training;Tactile cues;Manual cues  Sit to Stand: Moderate assistance;Assist X2;Adaptive equipment;Additional time  Stand to Sit: Moderate assistance;Assist X2;Adaptive equipment  Bed to Chair: Moderate assistance;Adaptive equipment;Assist X2      Balance:  Balance  Sitting: Intact  Standing: Impaired  Standing - Static: Fair;Constant support  Standing - Dynamic: Poor;Constant support      ADL Intervention:      UE Dressing: Minimal assistance  UE Dressing Skilled Clinical Factors: [facility], several vcs to pull sleeve up on L shoulder, physical assistance to pull  up on L    LE Dressing: Maximum assistance  LE Dressing Skilled Clinical Factors: pt attempted to don slipper sock on R LE long sitting and sitting EOB, pt able to lift R LE for sock to be donned, dependent for L         Pain Ratin/10 (no pain at SOS) L LE.   Pain Intervention(s):   nursing notified and pt declined ice    Activity Tolerance:   Fair  and requires rest breaks    After treatment patient left in no apparent distress:   Bed locked and returned to lowest position, Patient left in no apparent distress sitting up in chair, Call bell within reach, and Updated patient's board on functional status and mobility recommendations, and nsg updated     COMMUNICATION/EDUCATION:   The patient’s plan of care was discussed with: Physical therapist and Registered nurse    Patient Education  Education Given To: Patient  Education Provided: Plan of Care;Transfer Training  Education Provided Comments: importance of increasing OOB time  Education Method: Verbal  Barriers to Learning: Cognition  Education Outcome: Continued education needed    Thank you for this referral.  WISAM Fisher  Minutes: 28

## 2024-07-29 NOTE — CARE COORDINATION
MBS today.  Recent clinicals sent to accepting SNF ([facility]).      admissions coordinator [place] @ [phone]. Fax#[phone].        VIMAL Monte

## 2024-07-29 NOTE — PLAN OF CARE
Problem: Discharge Planning  Goal: Discharge to home or other facility with appropriate resources  7/29/2024 0957 by Dawna Haq RN  Outcome: Progressing  Flowsheets (Taken 7/29/2024 0737)  Discharge to home or other facility with appropriate resources:   Identify barriers to discharge with patient and caregiver   Arrange for needed discharge resources and transportation as appropriate  7/28/2024 2130 by Hannah Frank RN  Outcome: Progressing     Problem: Pain  Goal: Verbalizes/displays adequate comfort level or baseline comfort level  7/29/2024 0957 by Dawna Haq RN  Outcome: Progressing  Flowsheets (Taken 7/29/2024 0737)  Verbalizes/displays adequate comfort level or baseline comfort level:   Encourage patient to monitor pain and request assistance   Assess pain using appropriate pain scale  7/28/2024 2130 by Hannah Frank RN  Outcome: Progressing     Problem: Skin/Tissue Integrity  Goal: Absence of new skin breakdown  Description: 1.  Monitor for areas of redness and/or skin breakdown  2.  Assess vascular access sites hourly  3.  Every 4-6 hours minimum:  Change oxygen saturation probe site  4.  Every 4-6 hours:  If on nasal continuous positive airway pressure, respiratory therapy assess nares and determine need for appliance change or resting period.  7/29/2024 0957 by Dawna Haq RN  Outcome: Progressing  7/28/2024 2130 by Hannah Frank RN  Outcome: Progressing     Problem: ABCDS Injury Assessment  Goal: Absence of physical injury  7/29/2024 0957 by Dawna Haq RN  Outcome: Progressing  7/28/2024 2130 by Hannah Frank RN  Outcome: Progressing

## 2024-07-30 VITALS
HEIGHT: 66 IN | DIASTOLIC BLOOD PRESSURE: 61 MMHG | WEIGHT: 130 LBS | BODY MASS INDEX: 20.89 KG/M2 | RESPIRATION RATE: 16 BRPM | HEART RATE: 79 BPM | SYSTOLIC BLOOD PRESSURE: 148 MMHG | TEMPERATURE: 97.9 F | OXYGEN SATURATION: 97 %

## 2024-07-30 LAB
ANION GAP SERPL CALC-SCNC: 7 MMOL/L (ref 5–15)
BUN SERPL-MCNC: 12 MG/DL (ref 6–20)
BUN/CREAT SERPL: 17 (ref 12–20)
CA-I BLD-MCNC: 8.6 MG/DL (ref 8.5–10.1)
CHLORIDE SERPL-SCNC: 100 MMOL/L (ref 97–108)
CO2 SERPL-SCNC: 30 MMOL/L (ref 21–32)
CREAT SERPL-MCNC: 0.71 MG/DL (ref 0.55–1.02)
ERYTHROCYTE [DISTWIDTH] IN BLOOD BY AUTOMATED COUNT: 19.8 % (ref 11.5–14.5)
GLUCOSE SERPL-MCNC: 95 MG/DL (ref 65–100)
HCT VFR BLD AUTO: 26.8 % (ref 35–47)
HGB BLD-MCNC: 8.7 G/DL (ref 11.5–16)
MCH RBC QN AUTO: 29.6 PG (ref 26–34)
MCHC RBC AUTO-ENTMCNC: 32.5 G/DL (ref 30–36.5)
MCV RBC AUTO: 91.2 FL (ref 80–99)
NRBC # BLD: 0 K/UL (ref 0–0.01)
NRBC BLD-RTO: 0 PER 100 WBC
PLATELET # BLD AUTO: 290 K/UL (ref 150–400)
PMV BLD AUTO: 9 FL (ref 8.9–12.9)
POTASSIUM SERPL-SCNC: 3.3 MMOL/L (ref 3.5–5.1)
RBC # BLD AUTO: 2.94 M/UL (ref 3.8–5.2)
SODIUM SERPL-SCNC: 137 MMOL/L (ref 136–145)
WBC # BLD AUTO: 10.7 K/UL (ref 3.6–11)

## 2024-07-30 PROCEDURE — 36415 COLL VENOUS BLD VENIPUNCTURE: CPT

## 2024-07-30 PROCEDURE — 97530 THERAPEUTIC ACTIVITIES: CPT

## 2024-07-30 PROCEDURE — 6370000000 HC RX 637 (ALT 250 FOR IP): Performed by: HOSPITALIST

## 2024-07-30 PROCEDURE — 6360000002 HC RX W HCPCS: Performed by: HOSPITALIST

## 2024-07-30 PROCEDURE — 2580000003 HC RX 258: Performed by: HOSPITALIST

## 2024-07-30 PROCEDURE — 6370000000 HC RX 637 (ALT 250 FOR IP): Performed by: ORTHOPAEDIC SURGERY

## 2024-07-30 PROCEDURE — 80048 BASIC METABOLIC PNL TOTAL CA: CPT

## 2024-07-30 PROCEDURE — 97535 SELF CARE MNGMENT TRAINING: CPT

## 2024-07-30 PROCEDURE — 85027 COMPLETE CBC AUTOMATED: CPT

## 2024-07-30 RX ORDER — FERROUS SULFATE 325(65) MG
325 TABLET ORAL 2 TIMES DAILY WITH MEALS
Qty: 30 TABLET | Refills: 3 | Status: SHIPPED | OUTPATIENT
Start: 2024-07-30

## 2024-07-30 RX ADMIN — SODIUM CHLORIDE, PRESERVATIVE FREE 10 ML: 5 INJECTION INTRAVENOUS at 09:18

## 2024-07-30 RX ADMIN — ROPINIROLE 12 MG: 2 TABLET, FILM COATED, EXTENDED RELEASE ORAL at 09:20

## 2024-07-30 RX ADMIN — LEVOTHYROXINE SODIUM 50 MCG: 0.03 TABLET ORAL at 06:03

## 2024-07-30 RX ADMIN — ACETAMINOPHEN 1000 MG: 500 TABLET ORAL at 09:18

## 2024-07-30 RX ADMIN — DULOXETINE HYDROCHLORIDE 20 MG: 20 CAPSULE, DELAYED RELEASE ORAL at 09:18

## 2024-07-30 RX ADMIN — CELECOXIB 100 MG: 100 CAPSULE ORAL at 09:18

## 2024-07-30 RX ADMIN — PIPERACILLIN AND TAZOBACTAM 3375 MG: 3; .375 INJECTION, POWDER, LYOPHILIZED, FOR SOLUTION INTRAVENOUS at 06:03

## 2024-07-30 RX ADMIN — CALCIUM CARBONATE 500 MG: 500 TABLET, CHEWABLE ORAL at 09:17

## 2024-07-30 RX ADMIN — FERROUS SULFATE TAB 325 MG (65 MG ELEMENTAL FE) 325 MG: 325 (65 FE) TAB at 09:17

## 2024-07-30 RX ADMIN — PIPERACILLIN AND TAZOBACTAM 3375 MG: 3; .375 INJECTION, POWDER, LYOPHILIZED, FOR SOLUTION INTRAVENOUS at 14:14

## 2024-07-30 RX ADMIN — POTASSIUM CHLORIDE 40 MEQ: 1500 TABLET, EXTENDED RELEASE ORAL at 14:14

## 2024-07-30 RX ADMIN — GABAPENTIN 100 MG: 100 CAPSULE ORAL at 09:17

## 2024-07-30 RX ADMIN — ENOXAPARIN SODIUM 40 MG: 100 INJECTION SUBCUTANEOUS at 16:35

## 2024-07-30 RX ADMIN — FERROUS SULFATE TAB 325 MG (65 MG ELEMENTAL FE) 325 MG: 325 (65 FE) TAB at 16:36

## 2024-07-30 RX ADMIN — PANTOPRAZOLE SODIUM 20 MG: 20 TABLET, DELAYED RELEASE ORAL at 09:20

## 2024-07-30 RX ADMIN — ATORVASTATIN CALCIUM 40 MG: 40 TABLET, FILM COATED ORAL at 09:17

## 2024-07-30 RX ADMIN — GABAPENTIN 100 MG: 100 CAPSULE ORAL at 14:12

## 2024-07-30 RX ADMIN — ACETAMINOPHEN 1000 MG: 500 TABLET ORAL at 14:12

## 2024-07-30 ASSESSMENT — PAIN SCALES - GENERAL
PAINLEVEL_OUTOF10: 0

## 2024-07-30 NOTE — CARE COORDINATION
Awaiting bed assignment at this time, CM contacted [facility], [facility] [phone], no answer, awaiting callback, anticipate patient will be able to d/c today.

## 2024-07-30 NOTE — PLAN OF CARE
PHYSICAL THERAPY TREATMENT     Patient: Jericho Gómez (89 y.o. 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  Procedure(s) (LRB):  FEMUR INTRAMEDULLARY NAIL RETROGRADE, periprosthetic fracture (Left) 10 Days Post-Op  Precautions: General Precautions, Weight Bearing     Left Lower Extremity Weight Bearing: Toe Touch Weight Bearing                Recommendations for nursing mobility: Out of bed to chair for meals, Encourage HEP in prep for ADLs/mobility; see handout for details, Frequent repositioning to prevent skin breakdown, Use of BSC for toileting , AD and gt belt for bed to chair , and Assist x2    In place during session: Peripheral IV and External Catheter  Chart, physical therapy assessment, plan of care and goals were reviewed.  ASSESSMENT  Patient continues with skilled PT services and is progressing towards goals. Pt supine in bed upon PT arrival, agreeable to session. (See below for objective details and assist levels).     Overall pt tolerated session fair today. Pt transferred to the eob sba with the head of bed elevated. Pt sat eob demonstrating good sitting balance. Pt re-educated on TTWB on LLE. Pt transferred from bed>bedside commode with mod Ax2 and maintained TTWB on L. Once pt was finished, pt transferred to recliner chair. Pt required max verbal cues for sequencing backing up to recliner chair. Pt demonstrates a very narrow rachael and requires cues to separate feet. Pt is able to separate feet when asked, but once pt takes a step, stance becomes narrow again. Pt performed seated LE therex with no complaints of pain or discomfort. Pt left sitting up in chair  Attends with cues to redirect  Memory: Impaired  Safety Judgement: Decreased awareness of need for assistance;Decreased awareness of need for safety  Problem Solving: Decreased awareness of errors;Assistance required to correct errors made;Assistance required to implement solutions;Assistance required to identify errors made;Assistance required to generate solutions  Insights: Decreased awareness of deficits  Initiation: Requires cues for some  Sequencing: Requires cues for some  Functional Mobility Training:  Bed Mobility:  Bed Mobility Training  Bed Mobility Training: Yes  Rolling: Stand-by assistance;Additional time  Supine to Sit: Stand-by assistance;Additional time  Scooting: Stand-by assistance;Additional time  Transfers:  Transfer Training  Transfer Training: Yes  Sit to Stand: Moderate assistance;Assist X2;Additional time  Stand to Sit: Moderate assistance;Assist X2;Additional time  Bed to Chair: Moderate assistance;Assist X2;Additional time  Toilet Transfer: Assist X2;Additional time;Moderate assistance  Balance:  Balance  Sitting: Intact  Standing: Impaired  Standing - Static: Fair;Constant support  Standing - Dynamic: Poor;Constant support;Fair  Wheelchair Mobility:  Wheelchair Management  Wheelchair Management: No  Ambulation/Gait Training:  Left Side Weight Bearing: Toe touch  Gait  Gait Training: Yes  Left Side Weight Bearing: Toe touch  Overall Level of Assistance: Moderate assistance;Assist X2;Additional time  Distance (ft): 4 Feet  Assistive Device: Gait belt;Walker, rolling  Base of Support: Narrowed  Speed/Cherelle: Shuffled  Step Length: Right shortened  Gait Abnormalities: Decreased step clearance;Antalgic    Therapeutic Exercises:     EXERCISE   Sets   Reps   Active Active Assist   Passive Self ROM   Comments   Ankle Pumps  10 [x] [] [] []    Quad Sets/Glut Sets   [] [] [] []    Hamstring Sets   [] [] [] []    Short Arc Quads   [] [] [] []    Heel Slides   [] [] [] []    Straight Leg Raises   []  [] [] []    Hip abd/add   [] [] [] []    Long Arc Quads  10 [x] [] [] []    Marching  10 [x] [] [] []    Seated HR/TR   [] [] [] []       [] [] [] []       Pain Ratin/10 reported    Activity Tolerance:   Fair     After treatment patient left in no apparent distress:   Bed locked and returned to lowest position, Patient left in no apparent distress sitting up in chair and Call bell within reach, and nsg updated     COMMUNICATION/COLLABORATION:   The patient’s plan of care was discussed with: Occupational therapy assistant and Registered nurse    Patient Education  Education Given To: Patient  Education Provided: Role of Therapy;Plan of Care;Home Exercise Program;Energy Conservation;Transfer Training;Equipment;Fall Prevention Strategies  Education Method: Verbal;Demonstration  Education Outcome: Verbalized understanding;Continued education needed    PT/OT sessions occurred together for increased safety of pt and clinician.     Farzana Giron, PTA  Minutes: 41

## 2024-07-30 NOTE — PROGRESS NOTES
Daughter Noni called at [phone] to inform that patient has been accepted at [facility] and is going to room 912, ETA of [facility] at this time is 1900, attempted to call report twice to [facility] at [phone], message left with  including patient name, My name and telephone number to call back. Vitals stable, V removed and all belongings packed.    1812- RASHAWN Vines at [facility] received report, all questions answered

## 2024-07-30 NOTE — PLAN OF CARE
Problem: Pain  Goal: Verbalizes/displays adequate comfort level or baseline comfort level  Outcome: Progressing  Flowsheets (Taken 7/30/2024 0930)  Verbalizes/displays adequate comfort level or baseline comfort level:   Encourage patient to monitor pain and request assistance   Assess pain using appropriate pain scale   Administer analgesics based on type and severity of pain and evaluate response

## 2024-07-30 NOTE — PROGRESS NOTES
[facility] medical Transport here to take patient to [facility] [place]. Fentanyl patch still in place, verified with supervisor Gayle/TOMMY Da Silva.

## 2024-07-30 NOTE — CARE COORDINATION
Patient is clear to d/c from  [facility] [address] [place] [address]) room 912, nurse report can be called to [phone].    Nurse notified.    Transition of Care Plan:    RUR: 19%  Prior Level of Functioning: mostly independent  Disposition: SNF  If SNF or IPR: Date FOC offered:   Date FOC received:   Accepting facility: [facility]  Date authorization started with reference number: n/a  Date authorization received and expires:   Follow up appointments:   DME needed:   Transportation at discharge: stretcher  [facility] Medicare/ letter given: yes  Is patient a [place] and connected with [facility]?    If yes, was [place] transfer form completed and [facility] notified?   Caregiver Contact:   Discharge Caregiver contacted prior to discharge? Patient aware  Care Conference needed?   Barriers to discharge: n/a

## 2024-07-30 NOTE — DISCHARGE SUMMARY
Discharge Summary    Name: Jericho Gómez  [id number]  YOB: 1935 (Age: 89 y.o.)   Date of Admission: 7/18/2024  Date of Discharge: 7/30/2024  Attending Physician: Hair Rosado MD    Discharge Diagnosis:     Consultations:  IP CONSULT TO ORTHOPEDIC SURGERY  IP CONSULT TO ORTHOPEDIC SURGERY  IP WOUND CARE NURSE CONSULT TO EVAL  IP CONSULT TO DIETITIAN      Brief Admission History/Reason for Admission Per Bhanu Haque, DO:     Chief Complaint / Reason for Physician Visit  \" Fall at home\".  Discussed with RN events overnight.      7/19 - Admission H&P  89 y.o. female with pmh of HTN and PAF who presented to the hospital from free standing ED w/ an acute femur fracture.  The pt suffered a traumatic mechanical fall while walking up a set of concrete steps.  Post fall immediate pain was described and pt was unable to stand or ambulate.  Pt denied any head injury or LOC and was found to have a femur fxr via Xray.  Pt was hemodynamically stable on arrival and lab work reviewed.       7/29  Patient is awake however not very verbal and not alert and oriented.  Denies any chest pain or shortness of breath.  No overnight fever/chills, chest pain, shortness breath noted.  Currently pending MBS.    7/30  Patient is awake and alert and oriented x 3 today.  Denies any chest pain or shortness of breath.  No overnight fever/chills, chest pain, shortness breath noted.  MBS without any obvious aspiration noted.    Stable to be discharged to SNF at [facility].  Spoke to patient's daughter extensively regarding continuing on easy to chew diet and thin liquids per speech therapy recommendations.    ======================================================    Brief Hospital Course by Main Problems:     Aspiration pneumonia  Chest x-ray concerning for infiltrate for which she was initiated on Zosyn for possible aspiration pneumonia.  White cell count improved.  She has remained  instructions, follow-up, prescriptions, and preparing report for sign off to her PCP) :  35 minutes

## 2024-07-30 NOTE — PLAN OF CARE
Problem: Physical Therapy - Adult  Goal: By Discharge: Performs mobility at highest level of function for planned discharge setting.  See evaluation for individualized goals.  Description: FUNCTIONAL STATUS PRIOR TO ADMISSION: Patient was MIN A for ADLs and using a rollator for functional mobility.    HOME SUPPORT PRIOR TO ADMISSION: Patient is from facility--group home and needs assist for bathing there.Patient reported she was able to amb.    Physical Therapy Goals  Initiated 7/22/2024  Revised on 7/29/24    Pt stated goal: Want to go back to group home  Pt will be I with LE HEP in 7 days.  Pt will perform bed mobility with Minimal Assist in 7 days.  Pt will perform transfers with Minimal Assist in 7 days.   Pt will amb 20 feet with LRAD safely with Minimal Assist in 7 days.  Pt will verbalize and demonstrate compliance with fall/TTWB  precautions  in 7 days.   Pt will demonstrate improvement in standing/gait balance from poor to fair in 7 days.    7/30/2024 1058 by Farzana Giron PTA  Outcome: Progressing     Problem: Occupational Therapy - Adult  Goal: By Discharge: Performs self-care activities at highest level of function for planned discharge setting.  See evaluation for individualized goals.  Description: FUNCTIONAL STATUS PRIOR TO ADMISSION:  Prior to recent admission, pt was living at an group home in the memory care unit and required assistance with ADLs.    HOME SUPPORT: The patient lived in an group home with 24 hour care.    Occupational Therapy Goals:  Initiated 7/22/2024  Patient/Family stated goal: return to [facility]  1.  Patient will perform self-feeding with [place] within 7 day(s).  2.  Patient will perform grooming with [place] within 7 day(s).  3.  Patient will perform upper body dressing with Cocke within 7 day(s).  4.  Patient will perform toilet transfers with Cocke  within 7 day(s).  5.  Patient will perform all aspects of toileting with Cocke within 7 day(s).  6.  Patient will participate  in upper extremity therapeutic exercise/activities with [place] within 7 day(s).    7/30/2024 1352 by Kate Mancia OTA  Outcome: Progressing

## 2024-07-31 ENCOUNTER — TELEPHONE (OUTPATIENT)
Age: 89
End: 2024-07-31

## 2024-07-31 NOTE — TELEPHONE ENCOUNTER
[facility] is requesting a referral be provided by Dr. Martini to an orthopedic surgery to continue her post op care now that she has returned to [place]. They would like it faxed out to them at [phone]

## 2024-08-01 ENCOUNTER — TELEPHONE (OUTPATIENT)
Age: 89
End: 2024-08-01

## 2024-08-01 NOTE — TELEPHONE ENCOUNTER
Teri Wesley,     I just spoke with Rosenda from [facility] stating the Dr. Robles did a left femur fx surgery on this patient on 07/28/2024 in [place] and he's asking that she f/u with one of our providers.     She will be faxing over the records's to see if Dr. Gordon can take on the case.    Rosenda can be reached at [phone] if needed.

## 2024-08-05 DIAGNOSIS — S72.8X2A OTHER CLOSED FRACTURE OF LEFT FEMUR, UNSPECIFIED PORTION OF FEMUR, INITIAL ENCOUNTER (HCC): ICD-10-CM

## 2024-08-05 DIAGNOSIS — M80.051K: Primary | ICD-10-CM

## 2024-08-05 NOTE — TELEPHONE ENCOUNTER
An external referral has been faxed over to Rosenda with [facility]. Per Dr. Martini, an external referral was sent out.

## 2024-08-08 ENCOUNTER — TELEPHONE (OUTPATIENT)
Age: 89
End: 2024-08-08

## 2024-08-08 NOTE — TELEPHONE ENCOUNTER
Called pt to make a follow up appointment for FX care. Daughter stated that her mother is currently in [facility]. They have made an appointment to be seen at [facility] at the [facility]. They are also a lot closer to her and with the rehab having to transport her this is a better option for them. I did also inform her that after she was released from rehab or if she needed us at to call and we could make her an appointment.

## 2024-08-29 NOTE — ANESTHESIA POSTPROCEDURE EVALUATION
Department of Anesthesiology  Postprocedure Note    Patient: Jericho Gómez  MRN: [id number]    Procedure Summary       Date: 07/20/24 Room / Location: [facility] MAIN OR 07 / [facility] MAIN OR    Anesthesia Start: 0958 Anesthesia Stop: 1310    Procedure: FEMUR INTRAMEDULLARY NAIL RETROGRADE, periprosthetic fracture (Left: Hip) Diagnosis:       Closed nondisplaced subtrochanteric fracture of left femur, initial encounter ([facility])      (Closed nondisplaced subtrochanteric fracture of left femur, initial encounter ([facility]) [S72.25XA])    Surgeons: Beto Martini MD Responsible Provider: Filipe Stiles MD    Anesthesia Type: spinal ASA Status: 3            Anesthesia Type: No value filed.    Roxanna Phase I: Roxanna Score: 9    Roxanna Phase II:      Anesthesia Post Evaluation    Patient location during evaluation: PACU  Patient participation: complete - patient cannot participate  Level of consciousness: awake  Pain score: 0  Airway patency: patent  Nausea & Vomiting: no nausea and no vomiting  Cardiovascular status: hemodynamically stable  Respiratory status: acceptable  Hydration status: stable  Multimodal analgesia pain management approach        No notable events documented.

## 2024-09-20 DIAGNOSIS — M79.605 LEFT LEG PAIN: Primary | ICD-10-CM

## 2024-09-27 ENCOUNTER — HOSPITAL ENCOUNTER (OUTPATIENT)
Facility: HOSPITAL | Age: 89
Discharge: HOME | End: 2024-09-30
Payer: MEDICARE

## 2024-09-27 ENCOUNTER — OFFICE VISIT (OUTPATIENT)
Age: 89
End: 2024-09-27
Payer: MEDICARE

## 2024-09-27 DIAGNOSIS — M25.552 LEFT HIP PAIN: ICD-10-CM

## 2024-09-27 DIAGNOSIS — M25.562 LEFT KNEE PAIN, UNSPECIFIED CHRONICITY: Primary | ICD-10-CM

## 2024-09-27 DIAGNOSIS — M25.562 LEFT KNEE PAIN, UNSPECIFIED CHRONICITY: ICD-10-CM

## 2024-09-27 DIAGNOSIS — M89.8X5 PAIN OF LEFT FEMUR: ICD-10-CM

## 2024-09-27 PROCEDURE — 99213 OFFICE O/P EST LOW 20 MIN: CPT | Performed by: ORTHOPAEDIC SURGERY

## 2024-09-27 PROCEDURE — G8427 DOCREV CUR MEDS BY ELIG CLIN: HCPCS | Performed by: ORTHOPAEDIC SURGERY

## 2024-09-27 PROCEDURE — 1090F PRES/ABSN URINE INCON ASSESS: CPT | Performed by: ORTHOPAEDIC SURGERY

## 2024-09-27 PROCEDURE — 1036F TOBACCO NON-USER: CPT | Performed by: ORTHOPAEDIC SURGERY

## 2024-09-27 PROCEDURE — 73502 X-RAY EXAM HIP UNI 2-3 VIEWS: CPT

## 2024-09-27 PROCEDURE — 73552 X-RAY EXAM OF FEMUR 2/>: CPT

## 2024-09-27 PROCEDURE — 73562 X-RAY EXAM OF KNEE 3: CPT

## 2024-09-27 PROCEDURE — G8420 CALC BMI NORM PARAMETERS: HCPCS | Performed by: ORTHOPAEDIC SURGERY

## 2024-09-27 PROCEDURE — 1123F ACP DISCUSS/DSCN MKR DOCD: CPT | Performed by: ORTHOPAEDIC SURGERY

## 2024-09-27 NOTE — PROGRESS NOTES
right femur with nonunion    Essential hypertension      Family History   Problem Relation Age of Onset    No Known Problems Mother     No Known Problems Father        Past Surgical History:   Procedure Laterality Date    CHOLECYSTECTOMY      FERTILITY SURGERY Left 7/20/2024    FEMUR INTRAMEDULLARY NAIL RETROGRADE, periprosthetic fracture performed by Beto Martini MD at [facility] MAIN OR    HYSTERECTOMY (CERVIX STATUS UNKNOWN)      WRIST CLOSED REDUCTION Left 11/6/2023    LEFT WRIST CLOSED REDUCTION AND PINNING performed by Elijah Ramirez MD at [facility] MAIN OR      Past Medical History:   Diagnosis Date    Age-related osteoporosis without current pathological fracture     Aspiration pneumonia (HCC) 03/27/2022    B12 deficiency 05/10/2011    Essential hypertension     Hypothyroidism (acquired) 09/26/2011    Influenza A (H1N1) 03/27/2022    Mixed hyperlipidemia     Nonrheumatic aortic valve stenosis 02/28/2019    Paroxysmal atrial fibrillation (HCC) 03/27/2022    Formatting of this note might be different from the original.   Formatting of this note might be different from the original.   Takes Eliquis and Lopressor  Formatting of this note might be different from the original.   Takes Eliquis and Lopressor    Spinal stenosis of lumbar region with neurogenic claudication 01/28/2015        I have reviewed and agree with PFSH and ROS and intake form in chart and the record furthermore I have reviewed prior medical record(s) regarding this patients care during this appointment.     Review of Systems:   Patient is a pleasant appearing individual, appropriately dressed, well hydrated, well nourished, who is alert, appropriately oriented for age, and in no acute distress with a wheelchair bound gait and normal affect who does not appear to be in any significant pain.    Physical Exam:  Right Knee - Full Range of Motion, No crepitation, Grossly neurovascularly intact, Good cap refill, No skin lesion, No effusion, No gross

## 2024-09-30 DIAGNOSIS — M89.8X5 PAIN OF LEFT FEMUR: Primary | ICD-10-CM

## 2024-09-30 DIAGNOSIS — Z87.81 STATUS POST CLOSED FRACTURE OF LEFT FEMUR: ICD-10-CM

## 2024-09-30 DIAGNOSIS — M25.562 LEFT KNEE PAIN, UNSPECIFIED CHRONICITY: ICD-10-CM

## 2024-09-30 RX ORDER — CEFTRIAXONE 1 G/1
INJECTION, POWDER, FOR SOLUTION INTRAMUSCULAR; INTRAVENOUS
COMMUNITY
Start: 2024-09-14

## 2024-09-30 RX ORDER — APIXABAN 2.5 MG/1
TABLET, FILM COATED ORAL
COMMUNITY
Start: 2024-08-02

## 2024-10-24 ENCOUNTER — OFFICE VISIT (OUTPATIENT)
Age: 89
End: 2024-10-24
Payer: MEDICARE

## 2024-10-24 DIAGNOSIS — M25.562 LEFT KNEE PAIN, UNSPECIFIED CHRONICITY: ICD-10-CM

## 2024-10-24 DIAGNOSIS — M89.8X5 PAIN OF LEFT FEMUR: Primary | ICD-10-CM

## 2024-10-24 DIAGNOSIS — M25.552 LEFT HIP PAIN: ICD-10-CM

## 2024-10-24 PROBLEM — G89.11 ACUTE PAIN DUE TO TRAUMA: Status: ACTIVE | Noted: 2022-08-16

## 2024-10-24 PROBLEM — S06.5XAA SDH (SUBDURAL HEMATOMA): Status: ACTIVE | Noted: 2022-08-16

## 2024-10-24 PROBLEM — D68.9 MEDICATION INDUCED COAGULOPATHY (HCC): Status: ACTIVE | Noted: 2022-08-16
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
## 2024-10-24 PROBLEM — T50.905A MEDICATION INDUCED COAGULOPATHY (HCC): Status: ACTIVE | Noted: 2022-08-16

## 2024-10-24 PROBLEM — I48.0 PAROXYSMAL ATRIAL FIBRILLATION (HCC): Status: ACTIVE | Noted: 2022-03-27

## 2024-10-24 PROCEDURE — 1090F PRES/ABSN URINE INCON ASSESS: CPT | Performed by: ORTHOPAEDIC SURGERY

## 2024-10-24 PROCEDURE — G8420 CALC BMI NORM PARAMETERS: HCPCS | Performed by: ORTHOPAEDIC SURGERY

## 2024-10-24 PROCEDURE — G8427 DOCREV CUR MEDS BY ELIG CLIN: HCPCS | Performed by: ORTHOPAEDIC SURGERY

## 2024-10-24 PROCEDURE — 1036F TOBACCO NON-USER: CPT | Performed by: ORTHOPAEDIC SURGERY

## 2024-10-24 PROCEDURE — 99212 OFFICE O/P EST SF 10 MIN: CPT | Performed by: ORTHOPAEDIC SURGERY

## 2024-10-24 PROCEDURE — G8484 FLU IMMUNIZE NO ADMIN: HCPCS | Performed by: ORTHOPAEDIC SURGERY

## 2024-10-24 PROCEDURE — 1123F ACP DISCUSS/DSCN MKR DOCD: CPT | Performed by: ORTHOPAEDIC SURGERY

## 2024-10-24 RX ORDER — SULFAMETHOXAZOLE AND TRIMETHOPRIM 800; 160 MG/1; MG/1
TABLET ORAL
COMMUNITY
Start: 2024-10-22

## 2024-10-24 RX ORDER — FENTANYL 25 UG/1
PATCH TRANSDERMAL
COMMUNITY
Start: 2024-10-11

## 2024-10-24 RX ORDER — POTASSIUM CHLORIDE 750 MG/1
CAPSULE, EXTENDED RELEASE ORAL
COMMUNITY
Start: 2024-10-03

## 2024-10-24 RX ORDER — HYDROCHLOROTHIAZIDE 12.5 MG/1
TABLET ORAL
COMMUNITY
Start: 2024-10-02

## 2024-10-24 RX ORDER — OXYBUTYNIN CHLORIDE 5 MG/1
TABLET, EXTENDED RELEASE ORAL
COMMUNITY
Start: 2024-10-21

## 2024-10-24 NOTE — PROGRESS NOTES
appointment.     Review of Systems:   Patient is a pleasant appearing individual, appropriately dressed, well hydrated, well nourished, who is alert, appropriately oriented for age, and in no acute distress with a wheelchair bound gait and normal affect who does not appear to be in any significant pain.    Physical Exam:  Right Knee - Full Range of Motion, No crepitation, Grossly neurovascularly intact, Good cap refill, No skin lesion, No effusion, No gross instability, No point tenderness, No quadriceps weakness, No medial or lateral joint line tenderness, Negative Lachman's, Negative Lovely's exam.    Encounter Diagnoses   Name Primary?    Pain of left femur Yes    Left hip pain     Left knee pain, unspecified chronicity          On physical examination, she has got good range of motion.  Good capillary refill.  Grossly neurovascularly intact.  No instability.  Decreased strength and well-healed incision.      HPI:  The patient is here status post left retrograde dilma placement.  Surgery was done at outside institution.  She has been doing toe-touch weightbearing.    X-rays of the left femur 4 views and knee, AP, lateral and oblique done in our office of the femur and knee show well-aligned hardware, but no callus formation.      Xray of the left hip 2 views are unremarkable.    Assessment/Plan:  Plan will be for toe-touch weightbearing, continue with that.  I will see her back in 4 to 6 weeks.  Repeat x-rays of the left femur, AP and lateral and left knee AP and lateral and we will assess callus formation and go from there.    As part of continued conservative pain management options the patient was advised to utilize Tylenol or OTC NSAIDS as long as it is not medically contraindicated.     Return to Office:    Follow-up and Dispositions    Return in about 6 weeks (around 12/5/2024) for W/ XRAYS, DR GIRON.        Scribed by Ashly More LPN as dictated by Elijah Giron MD.  Documentation, performed by,

## (undated) DEVICE — PROTECTOR 0.054-.062IN WHT JURGAN BALL PIN GRD 6 BALL W SER

## (undated) DEVICE — SYRINGE 20ML LL S/C 50

## (undated) DEVICE — GLOVE SURG 7 BIOGEL PI ULTRATOUCH G

## (undated) DEVICE — GOWN,AURORA,FABRIC-REINFORCED,X-LARGE: Brand: MEDLINE

## (undated) DEVICE — SYRINGE MED 10ML LUERLOCK TIP W/O SFTY DISP

## (undated) DEVICE — DRAPE EQUIP C ARM 74X42 IN MOB XR W/ TIE RUBBER BND LF

## (undated) DEVICE — GLOVE SURG SZ 85 L12IN FNGR THK79MIL GRN LTX FREE

## (undated) DEVICE — 3M™ STERI-DRAPE™ U-DRAPE 1015: Brand: STERI-DRAPE™

## (undated) DEVICE — PENCIL ES CRD L10FT HND SWCHING ROCK SWCH W/ EDGE COAT BLDE

## (undated) DEVICE — DRESSING,GAUZE,XEROFORM,CURAD,1"X8",ST: Brand: CURAD

## (undated) DEVICE — NEEDLE SPNL 22GA BLK HUB S STL REG WALL FIT STYL W/ QNCKE

## (undated) DEVICE — MAJOR ORTHO PROCEDURE PACK: Brand: MEDLINE INDUSTRIES, INC.

## (undated) DEVICE — ZIMMER® STERILE DISPOSABLE TOURNIQUET CUFF WITH PLC, DUAL PORT, SINGLE BLADDER, 18 IN. (46 CM)

## (undated) DEVICE — APPLICATOR MEDICATED 26 CC SOLUTION HI LT ORNG CHLORAPREP

## (undated) DEVICE — APPLICATOR MEDICATED 26 CC SOLUTION CLR STRL CHLORAPREP

## (undated) DEVICE — COVER,TABLE,HEAVY DUTY,79"X110",STRL: Brand: MEDLINE

## (undated) DEVICE — SLING ARM M W2XL15IN D8IN BLU POLY/COTTON FOAM STRP VOGUE

## (undated) DEVICE — HYPODERMIC SAFETY NEEDLE: Brand: MONOJECT

## (undated) DEVICE — PADDING CAST W4INXL4YD ST COT COHESIVE HND TEARABLE SPEC

## (undated) DEVICE — FAN SPRAY KIT: Brand: PULSAVAC®

## (undated) DEVICE — PADDING CAST W3INXL4YD SYN NAT PROTOUCH

## (undated) DEVICE — DRAPE,ISOLATION,INCISE,INVISISHIELD: Brand: MEDLINE

## (undated) DEVICE — PACK,EXTREMITY III: Brand: MEDLINE

## (undated) DEVICE — 3M™ TEGADERM™ TRANSPARENT FILM DRESSING FRAME STYLE, 1629, 8 IN X 12 IN (20 CM X 30 CM), 10/CT 8CT/CASE: Brand: 3M™ TEGADERM™

## (undated) DEVICE — SHEET,DRAPE,70X100,STERILE: Brand: MEDLINE

## (undated) DEVICE — COUNTER NDL 40 COUNT HLD 70 FOAM BLK ADH W/ MAG

## (undated) DEVICE — GOWN,SIRUS,NONRNF,XLN/XL,20/CS: Brand: MEDLINE

## (undated) DEVICE — GUIDEWIRE ORTH L400MM DIA3.2MM FOR TFN

## (undated) DEVICE — GAUZE,SPONGE,4"X4",16PLY,STRL,LF,10/TRAY: Brand: MEDLINE

## (undated) DEVICE — BLADE,CARBON-STEEL,10,STRL,DISPOSABLE,TB: Brand: MEDLINE

## (undated) DEVICE — SYSTEM LBL CORRECT MED FLAG 4-IN-1 MARKER SHT OF 16

## (undated) DEVICE — ARGYLE FRAZIER SURGICAL SUCTION INSTRUMENT 10 FR/CH (3.3 MM): Brand: ARGYLE

## (undated) DEVICE — PADDING CAST W6INXL4YD ST COT COHESIVE HND TEARABLE SPEC

## (undated) DEVICE — 3M™ IOBAN™ 2 ANTIMICROBIAL INCISE DRAPE 6651EZ: Brand: IOBAN™ 2

## (undated) DEVICE — STERILE POLYISOPRENE POWDER-FREE SURGICAL GLOVES: Brand: PROTEXIS

## (undated) DEVICE — HYPODERMIC SAFETY NEEDLE: Brand: MAGELLAN

## (undated) DEVICE — SOLUTION IRRIG 500ML 0.9% SOD CHLO USP POUR PLAS BTL

## (undated) DEVICE — TOWEL,OR,DSP,ST,BLUE,DLX,6/PK,12PK/CS: Brand: MEDLINE

## (undated) DEVICE — SUTURE VICRYL + SZ 2-0 L36IN ABSRB UD L36MM CT-1 1/2 CIR VCP945H

## (undated) DEVICE — BNDG,ELSTC,MATRIX,STRL,3"X5YD,LF,HOOK&LP: Brand: MEDLINE

## (undated) DEVICE — BIT DRILL CALIBRATED 4.2 EX-LONG

## (undated) DEVICE — SUTURE MONOCRYL STRATAFIX SPRL SZ 3-0 L12IN ABSRB UD FS-1 L30X30CM SXMP2B410

## (undated) DEVICE — SUTURE VICRYL + SZ 0 L27IN ABSRB UD CT-1 L36MM 1/2 CIR TAPR VCP260H

## (undated) DEVICE — DRESSING FOAM POST OPERATIVE 4X10 IN MEPILEX BORDER AG

## (undated) DEVICE — UNDERGLOVE SURG SZ 7.5 BLU LTX FREE SYN POLYISOPRENE

## (undated) DEVICE — SUTURE VICRYL RAPIDE SZ 3-0 L18IN ABSRB UD PS-2 L19MM 3/8 CIR VR497

## (undated) DEVICE — DRAPE,U/ SHT,SPLIT,PLAS,STERIL: Brand: MEDLINE

## (undated) DEVICE — NEEDLE SPNL 22GA L3.5IN BLK HUB S STL REG WALL FIT STYL

## (undated) DEVICE — PAD ABSORBENT 40X28 IN POLY BACKING BLU DRI-SAFE

## (undated) DEVICE — TUBING SUCT 10FR MAL ALUM SHFT FN CAP VENT UNIV CONN W/ OBT

## (undated) DEVICE — SOLUTION SURG PREP 26 CC PURPREP

## (undated) DEVICE — GLOVE ORANGE PI 7   MSG9070

## (undated) DEVICE — GLOVE SURG SZ 65 THK91MIL LTX FREE SYN POLYISOPRENE

## (undated) DEVICE — ROD RMR L950MM DIA2.5MM W/ EXTN BALL TIP

## (undated) DEVICE — BIT DRL L145MM DIA4.2MM NONSTERILE 3 FLUT NDL PNT QUIK CPL

## (undated) DEVICE — LOTION PREP REMV 5OZ IODO CLR TINC OF BENZ DURAPREP